# Patient Record
Sex: MALE | Race: WHITE
[De-identification: names, ages, dates, MRNs, and addresses within clinical notes are randomized per-mention and may not be internally consistent; named-entity substitution may affect disease eponyms.]

---

## 2017-05-15 ENCOUNTER — HOSPITAL ENCOUNTER (INPATIENT)
Dept: HOSPITAL 8 - ED | Age: 72
LOS: 7 days | Discharge: NURSING, REHAB OR LONG-TERM CARE | DRG: 871 | End: 2017-05-22
Attending: FAMILY MEDICINE
Payer: COMMERCIAL

## 2017-05-15 VITALS — WEIGHT: 163.36 LBS | HEIGHT: 70 IN | BODY MASS INDEX: 23.39 KG/M2

## 2017-05-15 VITALS — DIASTOLIC BLOOD PRESSURE: 74 MMHG | SYSTOLIC BLOOD PRESSURE: 132 MMHG

## 2017-05-15 DIAGNOSIS — N39.0: ICD-10-CM

## 2017-05-15 DIAGNOSIS — G30.9: ICD-10-CM

## 2017-05-15 DIAGNOSIS — D64.9: ICD-10-CM

## 2017-05-15 DIAGNOSIS — E86.0: ICD-10-CM

## 2017-05-15 DIAGNOSIS — E87.6: ICD-10-CM

## 2017-05-15 DIAGNOSIS — F02.81: ICD-10-CM

## 2017-05-15 DIAGNOSIS — J96.00: ICD-10-CM

## 2017-05-15 DIAGNOSIS — J32.9: ICD-10-CM

## 2017-05-15 DIAGNOSIS — G92: ICD-10-CM

## 2017-05-15 DIAGNOSIS — Z66: ICD-10-CM

## 2017-05-15 DIAGNOSIS — G47.00: ICD-10-CM

## 2017-05-15 DIAGNOSIS — Z78.1: ICD-10-CM

## 2017-05-15 DIAGNOSIS — J69.0: ICD-10-CM

## 2017-05-15 DIAGNOSIS — E44.0: ICD-10-CM

## 2017-05-15 DIAGNOSIS — Z82.5: ICD-10-CM

## 2017-05-15 DIAGNOSIS — R32: ICD-10-CM

## 2017-05-15 DIAGNOSIS — A41.9: Primary | ICD-10-CM

## 2017-05-15 LAB
AST SERPL-CCNC: 22 U/L (ref 15–37)
BUN SERPL-MCNC: 13 MG/DL (ref 7–18)

## 2017-05-15 PROCEDURE — 96372 THER/PROPH/DIAG INJ SC/IM: CPT

## 2017-05-15 PROCEDURE — 87081 CULTURE SCREEN ONLY: CPT

## 2017-05-15 PROCEDURE — 87086 URINE CULTURE/COLONY COUNT: CPT

## 2017-05-15 PROCEDURE — 82140 ASSAY OF AMMONIA: CPT

## 2017-05-15 PROCEDURE — 96361 HYDRATE IV INFUSION ADD-ON: CPT

## 2017-05-15 PROCEDURE — 81001 URINALYSIS AUTO W/SCOPE: CPT

## 2017-05-15 PROCEDURE — 87040 BLOOD CULTURE FOR BACTERIA: CPT

## 2017-05-15 PROCEDURE — 82533 TOTAL CORTISOL: CPT

## 2017-05-15 PROCEDURE — 71010: CPT

## 2017-05-15 PROCEDURE — 36415 COLL VENOUS BLD VENIPUNCTURE: CPT

## 2017-05-15 PROCEDURE — 83735 ASSAY OF MAGNESIUM: CPT

## 2017-05-15 PROCEDURE — 90732 PPSV23 VACC 2 YRS+ SUBQ/IM: CPT

## 2017-05-15 PROCEDURE — 84439 ASSAY OF FREE THYROXINE: CPT

## 2017-05-15 PROCEDURE — 87324 CLOSTRIDIUM AG IA: CPT

## 2017-05-15 PROCEDURE — 82607 VITAMIN B-12: CPT

## 2017-05-15 PROCEDURE — 84443 ASSAY THYROID STIM HORMONE: CPT

## 2017-05-15 PROCEDURE — 76770 US EXAM ABDO BACK WALL COMP: CPT

## 2017-05-15 PROCEDURE — 93005 ELECTROCARDIOGRAM TRACING: CPT

## 2017-05-15 PROCEDURE — 80048 BASIC METABOLIC PNL TOTAL CA: CPT

## 2017-05-15 PROCEDURE — 84145 PROCALCITONIN (PCT): CPT

## 2017-05-15 PROCEDURE — 83605 ASSAY OF LACTIC ACID: CPT

## 2017-05-15 PROCEDURE — 82803 BLOOD GASES ANY COMBINATION: CPT

## 2017-05-15 PROCEDURE — 85025 COMPLETE CBC W/AUTO DIFF WBC: CPT

## 2017-05-15 PROCEDURE — 70450 CT HEAD/BRAIN W/O DYE: CPT

## 2017-05-15 PROCEDURE — 80053 COMPREHEN METABOLIC PANEL: CPT

## 2017-05-15 PROCEDURE — 82746 ASSAY OF FOLIC ACID SERUM: CPT

## 2017-05-15 PROCEDURE — 82962 GLUCOSE BLOOD TEST: CPT

## 2017-05-15 PROCEDURE — 36600 WITHDRAWAL OF ARTERIAL BLOOD: CPT

## 2017-05-15 PROCEDURE — 96374 THER/PROPH/DIAG INJ IV PUSH: CPT

## 2017-05-15 RX ADMIN — RISPERIDONE SCH MG: 1 TABLET, ORALLY DISINTEGRATING ORAL at 23:09

## 2017-05-15 RX ADMIN — ENOXAPARIN SODIUM SCH MG: 40 INJECTION SUBCUTANEOUS at 23:09

## 2017-05-15 RX ADMIN — RISPERIDONE SCH MG: 1 TABLET, ORALLY DISINTEGRATING ORAL at 23:14

## 2017-05-15 RX ADMIN — SODIUM CHLORIDE AND POTASSIUM CHLORIDE SCH MLS/HR: .9; .15 SOLUTION INTRAVENOUS at 23:00

## 2017-05-15 RX ADMIN — CEFTRIAXONE SCH MLS/HR: 1 INJECTION, SOLUTION INTRAVENOUS at 23:05

## 2017-05-16 VITALS — SYSTOLIC BLOOD PRESSURE: 105 MMHG | DIASTOLIC BLOOD PRESSURE: 60 MMHG

## 2017-05-16 VITALS — DIASTOLIC BLOOD PRESSURE: 63 MMHG | SYSTOLIC BLOOD PRESSURE: 101 MMHG

## 2017-05-16 VITALS — DIASTOLIC BLOOD PRESSURE: 67 MMHG | SYSTOLIC BLOOD PRESSURE: 108 MMHG

## 2017-05-16 VITALS — DIASTOLIC BLOOD PRESSURE: 70 MMHG | SYSTOLIC BLOOD PRESSURE: 113 MMHG

## 2017-05-16 VITALS — SYSTOLIC BLOOD PRESSURE: 105 MMHG | DIASTOLIC BLOOD PRESSURE: 65 MMHG

## 2017-05-16 LAB — BUN SERPL-MCNC: 9 MG/DL (ref 7–18)

## 2017-05-16 RX ADMIN — RISPERIDONE SCH MG: 0.5 TABLET ORAL at 21:00

## 2017-05-16 RX ADMIN — ZIPRASIDONE MESYLATE PRN MG: 20 INJECTION, POWDER, LYOPHILIZED, FOR SOLUTION INTRAMUSCULAR at 22:55

## 2017-05-16 RX ADMIN — ENOXAPARIN SODIUM SCH MG: 40 INJECTION SUBCUTANEOUS at 21:45

## 2017-05-16 RX ADMIN — ZIPRASIDONE MESYLATE PRN MG: 20 INJECTION, POWDER, LYOPHILIZED, FOR SOLUTION INTRAMUSCULAR at 05:52

## 2017-05-16 RX ADMIN — Medication SCH MG: at 21:00

## 2017-05-16 RX ADMIN — CEFTRIAXONE SCH MLS/HR: 1 INJECTION, SOLUTION INTRAVENOUS at 21:45

## 2017-05-16 RX ADMIN — CEFTRIAXONE SCH MLS/HR: 1 INJECTION, SOLUTION INTRAVENOUS at 09:05

## 2017-05-16 RX ADMIN — ZIPRASIDONE MESYLATE PRN MG: 20 INJECTION, POWDER, LYOPHILIZED, FOR SOLUTION INTRAMUSCULAR at 13:45

## 2017-05-16 RX ADMIN — SODIUM CHLORIDE AND POTASSIUM CHLORIDE SCH MLS/HR: .9; .15 SOLUTION INTRAVENOUS at 14:05

## 2017-05-16 RX ADMIN — LORAZEPAM PRN MG: 2 INJECTION INTRAMUSCULAR; INTRAVENOUS at 16:24

## 2017-05-16 RX ADMIN — ZIPRASIDONE MESYLATE PRN MG: 20 INJECTION, POWDER, LYOPHILIZED, FOR SOLUTION INTRAMUSCULAR at 20:15

## 2017-05-17 VITALS — DIASTOLIC BLOOD PRESSURE: 76 MMHG | SYSTOLIC BLOOD PRESSURE: 116 MMHG

## 2017-05-17 VITALS — SYSTOLIC BLOOD PRESSURE: 117 MMHG | DIASTOLIC BLOOD PRESSURE: 75 MMHG

## 2017-05-17 VITALS — DIASTOLIC BLOOD PRESSURE: 64 MMHG | SYSTOLIC BLOOD PRESSURE: 122 MMHG

## 2017-05-17 RX ADMIN — Medication SCH MG: at 21:00

## 2017-05-17 RX ADMIN — ENOXAPARIN SODIUM SCH MG: 40 INJECTION SUBCUTANEOUS at 22:32

## 2017-05-17 RX ADMIN — CEFTRIAXONE SCH MLS/HR: 1 INJECTION, SOLUTION INTRAVENOUS at 10:12

## 2017-05-17 RX ADMIN — RISPERIDONE SCH MG: 2 TABLET ORAL at 21:00

## 2017-05-17 RX ADMIN — CEFTRIAXONE SCH MLS/HR: 1 INJECTION, SOLUTION INTRAVENOUS at 22:32

## 2017-05-17 RX ADMIN — ZIPRASIDONE MESYLATE PRN MG: 20 INJECTION, POWDER, LYOPHILIZED, FOR SOLUTION INTRAMUSCULAR at 18:41

## 2017-05-17 RX ADMIN — ZIPRASIDONE MESYLATE PRN MG: 20 INJECTION, POWDER, LYOPHILIZED, FOR SOLUTION INTRAMUSCULAR at 08:07

## 2017-05-17 RX ADMIN — RISPERIDONE SCH MG: 0.5 TABLET ORAL at 10:12

## 2017-05-18 VITALS — SYSTOLIC BLOOD PRESSURE: 119 MMHG | DIASTOLIC BLOOD PRESSURE: 66 MMHG

## 2017-05-18 VITALS — SYSTOLIC BLOOD PRESSURE: 108 MMHG | DIASTOLIC BLOOD PRESSURE: 63 MMHG

## 2017-05-18 VITALS — SYSTOLIC BLOOD PRESSURE: 121 MMHG | DIASTOLIC BLOOD PRESSURE: 69 MMHG

## 2017-05-18 VITALS — DIASTOLIC BLOOD PRESSURE: 80 MMHG | SYSTOLIC BLOOD PRESSURE: 131 MMHG

## 2017-05-18 LAB — BUN SERPL-MCNC: 11 MG/DL (ref 7–18)

## 2017-05-18 RX ADMIN — RISPERIDONE SCH MG: 0.5 TABLET ORAL at 08:32

## 2017-05-18 RX ADMIN — ENOXAPARIN SODIUM SCH MG: 40 INJECTION SUBCUTANEOUS at 22:01

## 2017-05-18 RX ADMIN — Medication SCH MG: at 21:00

## 2017-05-18 RX ADMIN — CEFTRIAXONE SCH MLS/HR: 1 INJECTION, SOLUTION INTRAVENOUS at 21:59

## 2017-05-18 RX ADMIN — CEFTRIAXONE SCH MLS/HR: 1 INJECTION, SOLUTION INTRAVENOUS at 08:33

## 2017-05-18 RX ADMIN — RISPERIDONE SCH MG: 2 TABLET ORAL at 21:35

## 2017-05-19 VITALS — SYSTOLIC BLOOD PRESSURE: 113 MMHG | DIASTOLIC BLOOD PRESSURE: 72 MMHG

## 2017-05-19 VITALS — SYSTOLIC BLOOD PRESSURE: 76 MMHG | DIASTOLIC BLOOD PRESSURE: 34 MMHG

## 2017-05-19 LAB
ABG COLLECTION SITE: (no result)
BUN SERPL-MCNC: 22 MG/DL (ref 7–18)
COLLATERAL CIRCULATION TESTING: NORMAL
DIFF TOTAL CELLS COUNTED: (no result)
VERIFY COUNTS?: YES

## 2017-05-19 RX ADMIN — PIPERACILLIN SODIUM AND TAZOBACTAM SODIUM SCH MLS/HR: 3; .375 INJECTION, POWDER, LYOPHILIZED, FOR SOLUTION INTRAVENOUS at 00:16

## 2017-05-19 RX ADMIN — PIPERACILLIN SODIUM AND TAZOBACTAM SODIUM SCH MLS/HR: 3; .375 INJECTION, POWDER, LYOPHILIZED, FOR SOLUTION INTRAVENOUS at 19:48

## 2017-05-19 RX ADMIN — RISPERIDONE SCH MG: 2 TABLET ORAL at 20:12

## 2017-05-19 RX ADMIN — PIPERACILLIN SODIUM AND TAZOBACTAM SODIUM SCH MLS/HR: 3; .375 INJECTION, POWDER, LYOPHILIZED, FOR SOLUTION INTRAVENOUS at 07:06

## 2017-05-19 RX ADMIN — PIPERACILLIN SODIUM AND TAZOBACTAM SODIUM SCH MLS/HR: 3; .375 INJECTION, POWDER, LYOPHILIZED, FOR SOLUTION INTRAVENOUS at 13:27

## 2017-05-19 RX ADMIN — LINEZOLID SCH MLS/HR: 600 INJECTION, SOLUTION INTRAVENOUS at 14:33

## 2017-05-19 RX ADMIN — LINEZOLID SCH MLS/HR: 600 INJECTION, SOLUTION INTRAVENOUS at 03:26

## 2017-05-19 RX ADMIN — LORAZEPAM PRN MG: 2 INJECTION INTRAMUSCULAR; INTRAVENOUS at 04:36

## 2017-05-19 RX ADMIN — RISPERIDONE SCH MG: 0.5 TABLET ORAL at 08:00

## 2017-05-19 RX ADMIN — SODIUM CHLORIDE SCH MLS/HR: 0.9 INJECTION, SOLUTION INTRAVENOUS at 20:07

## 2017-05-19 RX ADMIN — LORAZEPAM PRN MG: 2 INJECTION INTRAMUSCULAR; INTRAVENOUS at 22:30

## 2017-05-19 RX ADMIN — Medication SCH MG: at 20:12

## 2017-05-20 VITALS — DIASTOLIC BLOOD PRESSURE: 69 MMHG | SYSTOLIC BLOOD PRESSURE: 114 MMHG

## 2017-05-20 VITALS — SYSTOLIC BLOOD PRESSURE: 94 MMHG | DIASTOLIC BLOOD PRESSURE: 52 MMHG

## 2017-05-20 VITALS — SYSTOLIC BLOOD PRESSURE: 110 MMHG | DIASTOLIC BLOOD PRESSURE: 65 MMHG

## 2017-05-20 LAB
AST SERPL-CCNC: 33 U/L (ref 15–37)
BUN SERPL-MCNC: 20 MG/DL (ref 7–18)

## 2017-05-20 RX ADMIN — PIPERACILLIN SODIUM AND TAZOBACTAM SODIUM SCH MLS/HR: 3; .375 INJECTION, POWDER, LYOPHILIZED, FOR SOLUTION INTRAVENOUS at 06:31

## 2017-05-20 RX ADMIN — LORAZEPAM PRN MG: 2 INJECTION INTRAMUSCULAR; INTRAVENOUS at 04:30

## 2017-05-20 RX ADMIN — SODIUM CHLORIDE SCH MLS/HR: 0.9 INJECTION, SOLUTION INTRAVENOUS at 02:52

## 2017-05-20 RX ADMIN — RISPERIDONE SCH MG: 0.5 TABLET ORAL at 08:00

## 2017-05-20 RX ADMIN — PIPERACILLIN SODIUM AND TAZOBACTAM SODIUM SCH MLS/HR: 3; .375 INJECTION, POWDER, LYOPHILIZED, FOR SOLUTION INTRAVENOUS at 19:46

## 2017-05-20 RX ADMIN — PIPERACILLIN SODIUM AND TAZOBACTAM SODIUM SCH MLS/HR: 3; .375 INJECTION, POWDER, LYOPHILIZED, FOR SOLUTION INTRAVENOUS at 12:45

## 2017-05-20 RX ADMIN — LINEZOLID SCH MLS/HR: 600 INJECTION, SOLUTION INTRAVENOUS at 02:52

## 2017-05-20 RX ADMIN — RISPERIDONE SCH MG: 2 TABLET ORAL at 21:59

## 2017-05-20 RX ADMIN — LINEZOLID SCH MLS/HR: 600 INJECTION, SOLUTION INTRAVENOUS at 12:45

## 2017-05-20 RX ADMIN — ZIPRASIDONE MESYLATE PRN MG: 20 INJECTION, POWDER, LYOPHILIZED, FOR SOLUTION INTRAMUSCULAR at 22:03

## 2017-05-20 RX ADMIN — PIPERACILLIN SODIUM AND TAZOBACTAM SODIUM SCH MLS/HR: 3; .375 INJECTION, POWDER, LYOPHILIZED, FOR SOLUTION INTRAVENOUS at 02:22

## 2017-05-20 RX ADMIN — Medication SCH MG: at 21:00

## 2017-05-20 RX ADMIN — ZIPRASIDONE MESYLATE PRN MG: 20 INJECTION, POWDER, LYOPHILIZED, FOR SOLUTION INTRAMUSCULAR at 22:08

## 2017-05-21 VITALS — SYSTOLIC BLOOD PRESSURE: 114 MMHG | DIASTOLIC BLOOD PRESSURE: 67 MMHG

## 2017-05-21 VITALS — SYSTOLIC BLOOD PRESSURE: 129 MMHG | DIASTOLIC BLOOD PRESSURE: 70 MMHG

## 2017-05-21 VITALS — DIASTOLIC BLOOD PRESSURE: 70 MMHG | SYSTOLIC BLOOD PRESSURE: 118 MMHG

## 2017-05-21 VITALS — DIASTOLIC BLOOD PRESSURE: 88 MMHG | SYSTOLIC BLOOD PRESSURE: 158 MMHG

## 2017-05-21 LAB — BUN SERPL-MCNC: 16 MG/DL (ref 7–18)

## 2017-05-21 RX ADMIN — LINEZOLID SCH MLS/HR: 600 INJECTION, SOLUTION INTRAVENOUS at 15:47

## 2017-05-21 RX ADMIN — PIPERACILLIN SODIUM AND TAZOBACTAM SODIUM SCH MLS/HR: 3; .375 INJECTION, POWDER, LYOPHILIZED, FOR SOLUTION INTRAVENOUS at 18:40

## 2017-05-21 RX ADMIN — RISPERIDONE SCH MG: 2 TABLET ORAL at 20:45

## 2017-05-21 RX ADMIN — LINEZOLID SCH MLS/HR: 600 INJECTION, SOLUTION INTRAVENOUS at 03:19

## 2017-05-21 RX ADMIN — ZIPRASIDONE MESYLATE PRN MG: 20 INJECTION, POWDER, LYOPHILIZED, FOR SOLUTION INTRAMUSCULAR at 21:12

## 2017-05-21 RX ADMIN — Medication SCH MG: at 20:44

## 2017-05-21 RX ADMIN — PIPERACILLIN SODIUM AND TAZOBACTAM SODIUM SCH MLS/HR: 3; .375 INJECTION, POWDER, LYOPHILIZED, FOR SOLUTION INTRAVENOUS at 14:21

## 2017-05-21 RX ADMIN — PIPERACILLIN SODIUM AND TAZOBACTAM SODIUM SCH MLS/HR: 3; .375 INJECTION, POWDER, LYOPHILIZED, FOR SOLUTION INTRAVENOUS at 08:12

## 2017-05-21 RX ADMIN — ZIPRASIDONE MESYLATE PRN MG: 20 INJECTION, POWDER, LYOPHILIZED, FOR SOLUTION INTRAMUSCULAR at 07:34

## 2017-05-21 RX ADMIN — PIPERACILLIN SODIUM AND TAZOBACTAM SODIUM SCH MLS/HR: 3; .375 INJECTION, POWDER, LYOPHILIZED, FOR SOLUTION INTRAVENOUS at 02:04

## 2017-05-21 RX ADMIN — LORAZEPAM PRN MG: 2 INJECTION INTRAMUSCULAR; INTRAVENOUS at 01:23

## 2017-05-21 RX ADMIN — RISPERIDONE SCH MG: 0.5 TABLET ORAL at 08:00

## 2017-05-22 VITALS — SYSTOLIC BLOOD PRESSURE: 106 MMHG | DIASTOLIC BLOOD PRESSURE: 61 MMHG

## 2017-05-22 VITALS — SYSTOLIC BLOOD PRESSURE: 124 MMHG | DIASTOLIC BLOOD PRESSURE: 62 MMHG

## 2017-05-22 VITALS — DIASTOLIC BLOOD PRESSURE: 67 MMHG | SYSTOLIC BLOOD PRESSURE: 116 MMHG

## 2017-05-22 VITALS — DIASTOLIC BLOOD PRESSURE: 78 MMHG | SYSTOLIC BLOOD PRESSURE: 128 MMHG

## 2017-05-22 LAB — BUN SERPL-MCNC: 9 MG/DL (ref 7–18)

## 2017-05-22 RX ADMIN — PIPERACILLIN SODIUM AND TAZOBACTAM SODIUM SCH MLS/HR: 3; .375 INJECTION, POWDER, LYOPHILIZED, FOR SOLUTION INTRAVENOUS at 01:33

## 2017-05-22 RX ADMIN — LINEZOLID SCH MLS/HR: 600 INJECTION, SOLUTION INTRAVENOUS at 14:49

## 2017-05-22 RX ADMIN — LINEZOLID SCH MLS/HR: 600 INJECTION, SOLUTION INTRAVENOUS at 03:14

## 2017-05-22 RX ADMIN — PIPERACILLIN SODIUM AND TAZOBACTAM SODIUM SCH MLS/HR: 3; .375 INJECTION, POWDER, LYOPHILIZED, FOR SOLUTION INTRAVENOUS at 13:25

## 2017-05-22 RX ADMIN — PIPERACILLIN SODIUM AND TAZOBACTAM SODIUM SCH MLS/HR: 3; .375 INJECTION, POWDER, LYOPHILIZED, FOR SOLUTION INTRAVENOUS at 06:35

## 2017-05-22 RX ADMIN — RISPERIDONE SCH MG: 0.5 TABLET ORAL at 09:24

## 2017-06-15 ENCOUNTER — HOSPITAL ENCOUNTER (INPATIENT)
Facility: MEDICAL CENTER | Age: 72
LOS: 15 days | DRG: 872 | End: 2017-06-30
Attending: EMERGENCY MEDICINE | Admitting: INTERNAL MEDICINE
Payer: COMMERCIAL

## 2017-06-15 ENCOUNTER — APPOINTMENT (OUTPATIENT)
Dept: RADIOLOGY | Facility: MEDICAL CENTER | Age: 72
DRG: 872 | End: 2017-06-15
Attending: EMERGENCY MEDICINE
Payer: COMMERCIAL

## 2017-06-15 ENCOUNTER — APPOINTMENT (OUTPATIENT)
Dept: RADIOLOGY | Facility: MEDICAL CENTER | Age: 72
DRG: 872 | End: 2017-06-15
Attending: STUDENT IN AN ORGANIZED HEALTH CARE EDUCATION/TRAINING PROGRAM
Payer: COMMERCIAL

## 2017-06-15 ENCOUNTER — APPOINTMENT (OUTPATIENT)
Dept: RADIOLOGY | Facility: MEDICAL CENTER | Age: 72
DRG: 872 | End: 2017-06-15
Attending: INTERNAL MEDICINE
Payer: COMMERCIAL

## 2017-06-15 ENCOUNTER — RESOLUTE PROFESSIONAL BILLING HOSPITAL PROF FEE (OUTPATIENT)
Dept: HOSPITALIST | Facility: MEDICAL CENTER | Age: 72
End: 2017-06-15
Payer: COMMERCIAL

## 2017-06-15 DIAGNOSIS — A04.72 C. DIFFICILE COLITIS: ICD-10-CM

## 2017-06-15 DIAGNOSIS — A04.72 C. DIFFICILE DIARRHEA: ICD-10-CM

## 2017-06-15 DIAGNOSIS — L25.8 DERMATITIS ASSOCIATED WITH INCONTINENCE: ICD-10-CM

## 2017-06-15 DIAGNOSIS — G30.9 ALZHEIMER'S DEMENTIA WITHOUT BEHAVIORAL DISTURBANCE, UNSPECIFIED TIMING OF DEMENTIA ONSET: ICD-10-CM

## 2017-06-15 DIAGNOSIS — J18.9 PNEUMONIA OF RIGHT LOWER LOBE DUE TO INFECTIOUS ORGANISM: ICD-10-CM

## 2017-06-15 DIAGNOSIS — F02.818 LATE ONSET ALZHEIMER'S DISEASE WITH BEHAVIORAL DISTURBANCE (HCC): ICD-10-CM

## 2017-06-15 DIAGNOSIS — F02.80 ALZHEIMER'S DEMENTIA WITHOUT BEHAVIORAL DISTURBANCE, UNSPECIFIED TIMING OF DEMENTIA ONSET: ICD-10-CM

## 2017-06-15 DIAGNOSIS — R32 DERMATITIS ASSOCIATED WITH INCONTINENCE: ICD-10-CM

## 2017-06-15 DIAGNOSIS — G30.1 LATE ONSET ALZHEIMER'S DISEASE WITH BEHAVIORAL DISTURBANCE (HCC): ICD-10-CM

## 2017-06-15 PROBLEM — D64.9 ANEMIA: Status: ACTIVE | Noted: 2017-06-15

## 2017-06-15 PROBLEM — R19.7 DIARRHEA: Status: ACTIVE | Noted: 2017-06-15

## 2017-06-15 PROBLEM — D72.829 LEUKOCYTOSIS: Status: ACTIVE | Noted: 2017-06-15

## 2017-06-15 PROBLEM — E87.6 HYPOKALEMIA: Status: ACTIVE | Noted: 2017-06-15

## 2017-06-15 LAB
ALBUMIN SERPL BCP-MCNC: 2.7 G/DL (ref 3.2–4.9)
ALBUMIN/GLOB SERPL: 1 G/DL
ALP SERPL-CCNC: 32 U/L (ref 30–99)
ALT SERPL-CCNC: 10 U/L (ref 2–50)
ANION GAP SERPL CALC-SCNC: 7 MMOL/L (ref 0–11.9)
APPEARANCE UR: CLEAR
AST SERPL-CCNC: 15 U/L (ref 12–45)
BASOPHILS # BLD AUTO: 0.3 % (ref 0–1.8)
BASOPHILS # BLD: 0.06 K/UL (ref 0–0.12)
BILIRUB SERPL-MCNC: 0.4 MG/DL (ref 0.1–1.5)
BILIRUB UR QL STRIP.AUTO: NEGATIVE
BUN SERPL-MCNC: 15 MG/DL (ref 8–22)
C DIFF DNA SPEC QL NAA+PROBE: POSITIVE
C DIFF TOX A+B STL QL IA: POSITIVE
C DIFF TOX GENS STL QL NAA+PROBE: ABNORMAL
CALCIUM SERPL-MCNC: 7.5 MG/DL (ref 8.5–10.5)
CHLORIDE SERPL-SCNC: 107 MMOL/L (ref 96–112)
CO2 SERPL-SCNC: 22 MMOL/L (ref 20–33)
COLOR UR: YELLOW
CREAT SERPL-MCNC: 0.72 MG/DL (ref 0.5–1.4)
EKG IMPRESSION: NORMAL
EKG IMPRESSION: NORMAL
EOSINOPHIL # BLD AUTO: 0.04 K/UL (ref 0–0.51)
EOSINOPHIL NFR BLD: 0.2 % (ref 0–6.9)
ERYTHROCYTE [DISTWIDTH] IN BLOOD BY AUTOMATED COUNT: 52.6 FL (ref 35.9–50)
GFR SERPL CREATININE-BSD FRML MDRD: >60 ML/MIN/1.73 M 2
GLOBULIN SER CALC-MCNC: 2.6 G/DL (ref 1.9–3.5)
GLUCOSE SERPL-MCNC: 115 MG/DL (ref 65–99)
GLUCOSE UR STRIP.AUTO-MCNC: NEGATIVE MG/DL
HCT VFR BLD AUTO: 39.6 % (ref 42–52)
HGB BLD-MCNC: 12.3 G/DL (ref 14–18)
IMM GRANULOCYTES # BLD AUTO: 0.09 K/UL (ref 0–0.11)
IMM GRANULOCYTES NFR BLD AUTO: 0.5 % (ref 0–0.9)
KETONES UR STRIP.AUTO-MCNC: 10 MG/DL
LACTATE BLD-SCNC: 1.1 MMOL/L (ref 0.5–2)
LACTATE BLD-SCNC: 1.2 MMOL/L (ref 0.5–2)
LACTATE BLD-SCNC: 1.6 MMOL/L (ref 0.5–2)
LEUKOCYTE ESTERASE UR QL STRIP.AUTO: NEGATIVE
LYMPHOCYTES # BLD AUTO: 1.49 K/UL (ref 1–4.8)
LYMPHOCYTES NFR BLD: 8.6 % (ref 22–41)
MAGNESIUM SERPL-MCNC: 1.6 MG/DL (ref 1.5–2.5)
MCH RBC QN AUTO: 30.1 PG (ref 27–33)
MCHC RBC AUTO-ENTMCNC: 31.1 G/DL (ref 33.7–35.3)
MCV RBC AUTO: 96.8 FL (ref 81.4–97.8)
MICRO URNS: ABNORMAL
MONOCYTES # BLD AUTO: 2.18 K/UL (ref 0–0.85)
MONOCYTES NFR BLD AUTO: 12.6 % (ref 0–13.4)
NEUTROPHILS # BLD AUTO: 13.38 K/UL (ref 1.82–7.42)
NEUTROPHILS NFR BLD: 77.8 % (ref 44–72)
NITRITE UR QL STRIP.AUTO: NEGATIVE
NRBC # BLD AUTO: 0 K/UL
NRBC BLD AUTO-RTO: 0 /100 WBC
PH UR STRIP.AUTO: 5.5 [PH]
PLATELET # BLD AUTO: 115 K/UL (ref 164–446)
PMV BLD AUTO: 9.9 FL (ref 9–12.9)
POTASSIUM SERPL-SCNC: 3.2 MMOL/L (ref 3.6–5.5)
PROT SERPL-MCNC: 5.3 G/DL (ref 6–8.2)
PROT UR QL STRIP: NEGATIVE MG/DL
RBC # BLD AUTO: 4.09 M/UL (ref 4.7–6.1)
RBC UR QL AUTO: NEGATIVE
SODIUM SERPL-SCNC: 136 MMOL/L (ref 135–145)
SP GR UR STRIP.AUTO: 1.02
WBC # BLD AUTO: 17.2 K/UL (ref 4.8–10.8)
WBC STL QL MICRO: NORMAL

## 2017-06-15 PROCEDURE — 84100 ASSAY OF PHOSPHORUS: CPT

## 2017-06-15 PROCEDURE — 770020 HCHG ROOM/CARE - TELE (206)

## 2017-06-15 PROCEDURE — 87493 C DIFF AMPLIFIED PROBE: CPT

## 2017-06-15 PROCEDURE — 83605 ASSAY OF LACTIC ACID: CPT | Mod: 91

## 2017-06-15 PROCEDURE — 700102 HCHG RX REV CODE 250 W/ 637 OVERRIDE(OP): Performed by: STUDENT IN AN ORGANIZED HEALTH CARE EDUCATION/TRAINING PROGRAM

## 2017-06-15 PROCEDURE — 700101 HCHG RX REV CODE 250: Performed by: STUDENT IN AN ORGANIZED HEALTH CARE EDUCATION/TRAINING PROGRAM

## 2017-06-15 PROCEDURE — 700102 HCHG RX REV CODE 250 W/ 637 OVERRIDE(OP): Performed by: INTERNAL MEDICINE

## 2017-06-15 PROCEDURE — 99223 1ST HOSP IP/OBS HIGH 75: CPT | Performed by: INTERNAL MEDICINE

## 2017-06-15 PROCEDURE — G8996 SWALLOW CURRENT STATUS: HCPCS | Mod: CK

## 2017-06-15 PROCEDURE — 36415 COLL VENOUS BLD VENIPUNCTURE: CPT

## 2017-06-15 PROCEDURE — 700105 HCHG RX REV CODE 258: Performed by: STUDENT IN AN ORGANIZED HEALTH CARE EDUCATION/TRAINING PROGRAM

## 2017-06-15 PROCEDURE — 83735 ASSAY OF MAGNESIUM: CPT | Mod: 91

## 2017-06-15 PROCEDURE — 700111 HCHG RX REV CODE 636 W/ 250 OVERRIDE (IP): Performed by: STUDENT IN AN ORGANIZED HEALTH CARE EDUCATION/TRAINING PROGRAM

## 2017-06-15 PROCEDURE — 87086 URINE CULTURE/COLONY COUNT: CPT

## 2017-06-15 PROCEDURE — 96365 THER/PROPH/DIAG IV INF INIT: CPT

## 2017-06-15 PROCEDURE — 700111 HCHG RX REV CODE 636 W/ 250 OVERRIDE (IP): Performed by: EMERGENCY MEDICINE

## 2017-06-15 PROCEDURE — 83630 LACTOFERRIN FECAL (QUAL): CPT

## 2017-06-15 PROCEDURE — 51798 US URINE CAPACITY MEASURE: CPT

## 2017-06-15 PROCEDURE — 96361 HYDRATE IV INFUSION ADD-ON: CPT

## 2017-06-15 PROCEDURE — 71010 DX-CHEST-PORTABLE (1 VIEW): CPT

## 2017-06-15 PROCEDURE — 93005 ELECTROCARDIOGRAM TRACING: CPT | Performed by: STUDENT IN AN ORGANIZED HEALTH CARE EDUCATION/TRAINING PROGRAM

## 2017-06-15 PROCEDURE — 85027 COMPLETE CBC AUTOMATED: CPT

## 2017-06-15 PROCEDURE — 92610 EVALUATE SWALLOWING FUNCTION: CPT

## 2017-06-15 PROCEDURE — 700101 HCHG RX REV CODE 250: Performed by: INTERNAL MEDICINE

## 2017-06-15 PROCEDURE — 96367 TX/PROPH/DG ADDL SEQ IV INF: CPT

## 2017-06-15 PROCEDURE — 87040 BLOOD CULTURE FOR BACTERIA: CPT | Mod: 91

## 2017-06-15 PROCEDURE — 74000 DX-ABDOMEN-1 VIEW: CPT

## 2017-06-15 PROCEDURE — 700105 HCHG RX REV CODE 258: Performed by: EMERGENCY MEDICINE

## 2017-06-15 PROCEDURE — 81003 URINALYSIS AUTO W/O SCOPE: CPT

## 2017-06-15 PROCEDURE — 93010 ELECTROCARDIOGRAM REPORT: CPT | Mod: 76 | Performed by: INTERNAL MEDICINE

## 2017-06-15 PROCEDURE — G8997 SWALLOW GOAL STATUS: HCPCS | Mod: CJ

## 2017-06-15 PROCEDURE — 80053 COMPREHEN METABOLIC PANEL: CPT | Mod: 91

## 2017-06-15 PROCEDURE — 87324 CLOSTRIDIUM AG IA: CPT

## 2017-06-15 PROCEDURE — 84484 ASSAY OF TROPONIN QUANT: CPT

## 2017-06-15 PROCEDURE — 85025 COMPLETE CBC W/AUTO DIFF WBC: CPT

## 2017-06-15 PROCEDURE — 99285 EMERGENCY DEPT VISIT HI MDM: CPT

## 2017-06-15 PROCEDURE — 89055 LEUKOCYTE ASSESSMENT FECAL: CPT

## 2017-06-15 PROCEDURE — 85007 BL SMEAR W/DIFF WBC COUNT: CPT

## 2017-06-15 PROCEDURE — A9270 NON-COVERED ITEM OR SERVICE: HCPCS | Performed by: STUDENT IN AN ORGANIZED HEALTH CARE EDUCATION/TRAINING PROGRAM

## 2017-06-15 PROCEDURE — A9270 NON-COVERED ITEM OR SERVICE: HCPCS | Performed by: INTERNAL MEDICINE

## 2017-06-15 RX ORDER — POTASSIUM CHLORIDE 20 MEQ/1
40 TABLET, EXTENDED RELEASE ORAL ONCE
Status: COMPLETED | OUTPATIENT
Start: 2017-06-15 | End: 2017-06-15

## 2017-06-15 RX ORDER — QUETIAPINE FUMARATE 25 MG/1
50 TABLET, FILM COATED ORAL EVERY 8 HOURS PRN
Status: DISCONTINUED | OUTPATIENT
Start: 2017-06-15 | End: 2017-06-15

## 2017-06-15 RX ORDER — SODIUM CHLORIDE 9 MG/ML
30 INJECTION, SOLUTION INTRAVENOUS ONCE
Status: COMPLETED | OUTPATIENT
Start: 2017-06-15 | End: 2017-06-15

## 2017-06-15 RX ORDER — BISACODYL 10 MG
10 SUPPOSITORY, RECTAL RECTAL
Status: DISCONTINUED | OUTPATIENT
Start: 2017-06-15 | End: 2017-06-15 | Stop reason: SINTOL

## 2017-06-15 RX ORDER — SODIUM CHLORIDE 9 MG/ML
1000 INJECTION, SOLUTION INTRAVENOUS ONCE
Status: DISCONTINUED | OUTPATIENT
Start: 2017-06-15 | End: 2017-06-15

## 2017-06-15 RX ORDER — DIVALPROEX SODIUM 125 MG/1
125 CAPSULE, COATED PELLETS ORAL 4 TIMES DAILY
COMMUNITY

## 2017-06-15 RX ORDER — TAMSULOSIN HYDROCHLORIDE 0.4 MG/1
0.8 CAPSULE ORAL
COMMUNITY

## 2017-06-15 RX ORDER — LORAZEPAM 1 MG/1
1 TABLET ORAL EVERY 4 HOURS PRN
Status: DISCONTINUED | OUTPATIENT
Start: 2017-06-15 | End: 2017-06-15

## 2017-06-15 RX ORDER — CEFTRIAXONE 1 G/1
1 INJECTION, POWDER, FOR SOLUTION INTRAMUSCULAR; INTRAVENOUS ONCE
Status: COMPLETED | OUTPATIENT
Start: 2017-06-15 | End: 2017-06-15

## 2017-06-15 RX ORDER — POTASSIUM CHLORIDE 1.5 G/1.58G
40 POWDER, FOR SOLUTION ORAL ONCE
Status: COMPLETED | OUTPATIENT
Start: 2017-06-15 | End: 2017-06-15

## 2017-06-15 RX ORDER — QUETIAPINE FUMARATE 25 MG/1
50 TABLET, FILM COATED ORAL EVERY 8 HOURS PRN
COMMUNITY

## 2017-06-15 RX ORDER — POLYETHYLENE GLYCOL 3350 17 G/17G
1 POWDER, FOR SOLUTION ORAL
Status: DISCONTINUED | OUTPATIENT
Start: 2017-06-15 | End: 2017-06-15 | Stop reason: SINTOL

## 2017-06-15 RX ORDER — LORAZEPAM 2 MG/ML
0.5 INJECTION INTRAMUSCULAR
Status: DISCONTINUED | OUTPATIENT
Start: 2017-06-15 | End: 2017-06-19

## 2017-06-15 RX ORDER — SODIUM CHLORIDE 9 MG/ML
INJECTION, SOLUTION INTRAVENOUS CONTINUOUS
Status: DISCONTINUED | OUTPATIENT
Start: 2017-06-15 | End: 2017-06-17

## 2017-06-15 RX ORDER — ACETAMINOPHEN 325 MG/1
650 TABLET ORAL EVERY 6 HOURS PRN
Status: DISCONTINUED | OUTPATIENT
Start: 2017-06-15 | End: 2017-06-30 | Stop reason: HOSPADM

## 2017-06-15 RX ORDER — AMOXICILLIN 250 MG
2 CAPSULE ORAL 2 TIMES DAILY
Status: DISCONTINUED | OUTPATIENT
Start: 2017-06-15 | End: 2017-06-15 | Stop reason: SINTOL

## 2017-06-15 RX ORDER — QUETIAPINE FUMARATE 25 MG/1
25 TABLET, FILM COATED ORAL 2 TIMES DAILY
COMMUNITY

## 2017-06-15 RX ORDER — CIPROFLOXACIN 500 MG/1
500 TABLET, FILM COATED ORAL 2 TIMES DAILY
Status: ON HOLD | COMMUNITY
Start: 2017-06-02 | End: 2017-06-30

## 2017-06-15 RX ORDER — LORAZEPAM 2 MG/ML
2 INJECTION INTRAMUSCULAR
Status: DISCONTINUED | OUTPATIENT
Start: 2017-06-15 | End: 2017-06-19

## 2017-06-15 RX ORDER — ACETAMINOPHEN 325 MG/1
650 TABLET ORAL EVERY 6 HOURS PRN
COMMUNITY

## 2017-06-15 RX ORDER — DIVALPROEX SODIUM 125 MG/1
125 CAPSULE, COATED PELLETS ORAL EVERY 6 HOURS
Status: DISCONTINUED | OUTPATIENT
Start: 2017-06-15 | End: 2017-06-30 | Stop reason: HOSPADM

## 2017-06-15 RX ORDER — AZITHROMYCIN 500 MG/1
500 INJECTION, POWDER, LYOPHILIZED, FOR SOLUTION INTRAVENOUS ONCE
Status: COMPLETED | OUTPATIENT
Start: 2017-06-15 | End: 2017-06-15

## 2017-06-15 RX ORDER — SODIUM CHLORIDE 9 MG/ML
500 INJECTION, SOLUTION INTRAVENOUS ONCE
Status: COMPLETED | OUTPATIENT
Start: 2017-06-15 | End: 2017-06-15

## 2017-06-15 RX ORDER — SODIUM CHLORIDE 9 MG/ML
INJECTION, SOLUTION INTRAVENOUS CONTINUOUS
Status: DISCONTINUED | OUTPATIENT
Start: 2017-06-15 | End: 2017-06-15

## 2017-06-15 RX ORDER — LORAZEPAM 2 MG/ML
1 INJECTION INTRAMUSCULAR
Status: DISCONTINUED | OUTPATIENT
Start: 2017-06-15 | End: 2017-06-19

## 2017-06-15 RX ORDER — QUETIAPINE FUMARATE 25 MG/1
25 TABLET, FILM COATED ORAL 2 TIMES DAILY
Status: DISCONTINUED | OUTPATIENT
Start: 2017-06-15 | End: 2017-06-15

## 2017-06-15 RX ORDER — LORAZEPAM 1 MG/1
1 TABLET ORAL EVERY 4 HOURS PRN
Status: ON HOLD | COMMUNITY
End: 2017-06-30

## 2017-06-15 RX ADMIN — METRONIDAZOLE 500 MG: 500 INJECTION, SOLUTION INTRAVENOUS at 13:56

## 2017-06-15 RX ADMIN — VANCOMYCIN HYDROCHLORIDE 125 MG: 10 INJECTION, POWDER, LYOPHILIZED, FOR SOLUTION INTRAVENOUS at 17:38

## 2017-06-15 RX ADMIN — DIVALPROEX SODIUM 125 MG: 125 CAPSULE, COATED PELLETS ORAL at 17:38

## 2017-06-15 RX ADMIN — DIVALPROEX SODIUM 125 MG: 125 CAPSULE, COATED PELLETS ORAL at 11:59

## 2017-06-15 RX ADMIN — SODIUM CHLORIDE: 9 INJECTION, SOLUTION INTRAVENOUS at 20:27

## 2017-06-15 RX ADMIN — POTASSIUM CHLORIDE 40 MEQ: 1.5 POWDER, FOR SOLUTION ORAL at 16:22

## 2017-06-15 RX ADMIN — SODIUM CHLORIDE 2382 ML: 9 INJECTION, SOLUTION INTRAVENOUS at 02:45

## 2017-06-15 RX ADMIN — AZITHROMYCIN FOR INJECTION INJECTION, POWDER, LYOPHILIZED, FOR SOLUTION 500 MG: 500 INJECTION INTRAVENOUS at 04:10

## 2017-06-15 RX ADMIN — METRONIDAZOLE 500 MG: 500 INJECTION, SOLUTION INTRAVENOUS at 20:26

## 2017-06-15 RX ADMIN — ENOXAPARIN SODIUM 40 MG: 100 INJECTION SUBCUTANEOUS at 10:07

## 2017-06-15 RX ADMIN — ACETAMINOPHEN 650 MG: 325 TABLET, FILM COATED ORAL at 20:26

## 2017-06-15 RX ADMIN — THERA TABS 1 TABLET: TAB at 13:56

## 2017-06-15 RX ADMIN — SODIUM CHLORIDE 500 ML: 9 INJECTION, SOLUTION INTRAVENOUS at 20:27

## 2017-06-15 RX ADMIN — SODIUM CHLORIDE: 9 INJECTION, SOLUTION INTRAVENOUS at 07:35

## 2017-06-15 RX ADMIN — QUETIAPINE FUMARATE 25 MG: 25 TABLET ORAL at 11:59

## 2017-06-15 RX ADMIN — DOXYCYCLINE 100 MG: 100 INJECTION, POWDER, LYOPHILIZED, FOR SOLUTION INTRAVENOUS at 10:07

## 2017-06-15 RX ADMIN — VANCOMYCIN HYDROCHLORIDE 125 MG: 10 INJECTION, POWDER, LYOPHILIZED, FOR SOLUTION INTRAVENOUS at 11:59

## 2017-06-15 RX ADMIN — CEFTRIAXONE SODIUM 1 G: 1 INJECTION, POWDER, FOR SOLUTION INTRAMUSCULAR; INTRAVENOUS at 03:35

## 2017-06-15 RX ADMIN — VANCOMYCIN HYDROCHLORIDE 125 MG: 10 INJECTION, POWDER, LYOPHILIZED, FOR SOLUTION INTRAVENOUS at 07:45

## 2017-06-15 ASSESSMENT — PAIN SCALES - GENERAL
PAINLEVEL_OUTOF10: 0

## 2017-06-15 ASSESSMENT — LIFESTYLE VARIABLES
ALCOHOL_USE: NO
EVER_SMOKED: NEVER

## 2017-06-15 NOTE — IP AVS SNAPSHOT
EveryMove Access Code: [email or web address]  Expires: 7/30/2017 12:18 PM    Your email address is not on file at [facility].  Email Addresses are required for you to sign up for EveryMove, please contact [phone] to verify your personal information and to provide your email address prior to attempting to register for EveryMove.    Neymar Anderson  [address] [place] [address]    Transperat  A secure, online tool to manage your health information     [facility]’s EveryMove® is a secure, online tool that connects you to your personalized health information from the privacy of your home -- day or night - making it very easy for you to manage your healthcare. Once the activation process is completed, you can even access your medical information using the EveryMove ophelia, which is available for free in the Apple Ophelia store or Google Play store.     To learn more about EveryMove, visit www."[employer]/EveryMove    There are two levels of access available (as shown below):   My Chart Features  [facility] Primary Care Doctor [facility]  Specialists [facility]  Urgent  Care Non-[facility] Primary Care Doctor   Email your healthcare team securely and privately 24/7 X X X    Manage appointments: schedule your next appointment; view details of past/upcoming appointments X      Request prescription refills. X      View recent personal medical records, including lab and immunizations X X X X   View health record, including health history, allergies, medications X X X X   Read reports about your outpatient visits, procedures, consult and ER notes X X X X   See your discharge summary, which is a recap of your hospital and/or ER visit that includes your diagnosis, lab results, and care plan X X  X     How to register for Transperat:  Once your e-mail address has been verified, follow the following steps to sign up for Transperat.     1. Go to  [email or web address]  2. Click on the Sign Up Now box, which takes you to the New Member Sign Up page. You will need  to provide the following information:  a. Enter your [employer] Access Code exactly as it appears at the top of this page. (You will not need to use this code after you’ve completed the sign-up process. If you do not sign up before the expiration date, you must request a new code.)   b. Enter your date of birth.   c. Enter your home email address.   d. Click Submit, and follow the next screen’s instructions.  3. Create a [employer] ID. This will be your [employer]istics login ID and cannot be changed, so think of one that is secure and easy to remember.  4. Create a [employer] password. You can change your password at any time.  5. Enter your Password Reset Question and Answer. This can be used at a later time if you forget your password.   6. Enter your e-mail address. This allows you to receive e-mail notifications when new information is available in [employer].  7. Click Sign Up. You can now view your health information.    For assistance activating your [employer] account, call [phone]

## 2017-06-15 NOTE — H&P
[facility] Admitting History and Physical    Name Neymar Anderson     1945   Age/Sex 71 y.o. male   MRN [id number]   Code Status  Full code     After 5PM or if no immediate response to page, please call for cross-coverage  Attending/Team: Dr. Ibrahim Call [phone] to page   1st Call - Day Intern (R1):   Dr. Kemp  2nd Call - Day Sr. Resident (R2/R3):   Dr. Minaya       Chief Complaint:  Diarrhea and fever    HPI:  Neymar Anderson is a 71 y.o. male with past medical history of Alzheimer dementia, brought to the ER from [facility] for diarrhea and fever for two days, patient is poor historian, answer all the questions with no, all the history was obtained from chart review, Patient has  fever and diarrhea, patient recently finished 10 day course of ciprofloxacin.The patient denies headache, chest pain, abdominal pain, or shortness of breath.    Review of Systems   Unable to perform ROS          Past Medical History:   No past medical history on file.   Unable to perform     Past Surgical History:  No past surgical history on file.  Unable to perform  Current Outpatient Medications:  Home Medications     **Home medications have not yet been reviewed for this encounter**        Medication Allergy/Sensitivities:  Allergies no known allergies    Family History:  No family history on file.  Unable to perform  Social History:  Social History     Social History   • Marital Status: Single     Spouse Name: N/A   • Number of Children: N/A   • Years of Education: N/A     Occupational History   • Not on file.     Social History Main Topics   • Smoking status: Not on file   • Smokeless tobacco: Not on file   • Alcohol Use: Not on file   • Drug Use: Not on file   • Sexual Activity: Not on file     Other Topics Concern   • Not on file     Social History Narrative   • No narrative on file     Living situation: [facility]   PCP : Bong Monson M.D.    Physical Exam     Filed Vitals:    06/15/17 1399  "06/15/17 0400 06/15/17 0430 06/15/17 0500   BP:       Pulse: 108 112 100 114   Temp:       Resp: 24 24 24 16   Height:       Weight:       SpO2: 89% 98% 99% 99%     Body mass index is 26.61 kg/(m^2).  BP 98/62 mmHg  Pulse 114  Temp(Src) 37.4 °C (99.3 °F)  Resp 16  Ht 1.727 m (5' 8\")  Wt 79.379 kg (175 lb)  BMI 26.61 kg/m2  SpO2 99%  O2 therapy: Pulse Oximetry: 99 %, O2 (LPM): 0, O2 Delivery: None (Room Air)    Physical Exam   Constitutional:   Cachexia,     HENT:   Head: Normocephalic and atraumatic.   Eyes: Pupils are equal, round, and reactive to light. Right eye exhibits no discharge. Left eye exhibits no discharge.   Neck: No JVD present. No tracheal deviation present.   Cardiovascular: Normal heart sounds.  Exam reveals no gallop and no friction rub.    No murmur heard.  Pulmonary/Chest: No respiratory distress. He has no wheezes. He has no rales. He exhibits no tenderness.   Abdominal: Soft. He exhibits no distension. There is no tenderness. There is no rebound and no guarding.   Musculoskeletal: He exhibits no edema or tenderness.   3/5 motor strength in the lower extremities  , rt sided resting tremor for parkinson disappear with sleeping   Lymphadenopathy:     He has no cervical adenopathy.   Neurological: He is alert.   Disoriented x 3   Skin: Skin is warm.       Data Review       Old Records Request:   Completed  Current Records review and summary: Completed    Lab Data Review:  Recent Results (from the past 24 hour(s))   COMP METABOLIC PANEL    Collection Time: 06/15/17  1:48 AM   Result Value Ref Range    Sodium 136 135 - 145 mmol/L    Potassium 3.2 (L) 3.6 - 5.5 mmol/L    Chloride 107 96 - 112 mmol/L    Co2 22 20 - 33 mmol/L    Anion Gap 7.0 0.0 - 11.9    Glucose 115 (H) 65 - 99 mg/dL    Bun 15 8 - 22 mg/dL    Creatinine 0.72 0.50 - 1.40 mg/dL    Calcium 7.5 (L) 8.5 - 10.5 mg/dL    AST(SGOT) 15 12 - 45 U/L    ALT(SGPT) 10 2 - 50 U/L    Alkaline Phosphatase 32 30 - 99 U/L    Total Bilirubin 0.4 " 0.1 - 1.5 mg/dL    Albumin 2.7 (L) 3.2 - 4.9 g/dL    Total Protein 5.3 (L) 6.0 - 8.2 g/dL    Globulin 2.6 1.9 - 3.5 g/dL    A-G Ratio 1.0 g/dL   ESTIMATED GFR    Collection Time: 06/15/17  1:48 AM   Result Value Ref Range    GFR If African American >60 >60 mL/min/1.73 m 2    GFR If Non African American >60 >60 mL/min/1.73 m 2   LACTIC ACID    Collection Time: 06/15/17  2:30 AM   Result Value Ref Range    Lactic Acid 1.1 0.5 - 2.0 mmol/L   CBC WITH DIFFERENTIAL    Collection Time: 06/15/17  4:00 AM   Result Value Ref Range    WBC 17.2 (H) 4.8 - 10.8 K/uL    RBC 4.09 (L) 4.70 - 6.10 M/uL    Hemoglobin 12.3 (L) 14.0 - 18.0 g/dL    Hematocrit 39.6 (L) 42.0 - 52.0 %    MCV 96.8 81.4 - 97.8 fL    MCH 30.1 27.0 - 33.0 pg    MCHC 31.1 (L) 33.7 - 35.3 g/dL    RDW 52.6 (H) 35.9 - 50.0 fL    Platelet Count 115 (L) 164 - 446 K/uL    MPV 9.9 9.0 - 12.9 fL    Neutrophils-Polys 77.80 (H) 44.00 - 72.00 %    Lymphocytes 8.60 (L) 22.00 - 41.00 %    Monocytes 12.60 0.00 - 13.40 %    Eosinophils 0.20 0.00 - 6.90 %    Basophils 0.30 0.00 - 1.80 %    Immature Granulocytes 0.50 0.00 - 0.90 %    Nucleated RBC 0.00 /100 WBC    Neutrophils (Absolute) 13.38 (H) 1.82 - 7.42 K/uL    Lymphs (Absolute) 1.49 1.00 - 4.80 K/uL    Monos (Absolute) 2.18 (H) 0.00 - 0.85 K/uL    Eos (Absolute) 0.04 0.00 - 0.51 K/uL    Baso (Absolute) 0.06 0.00 - 0.12 K/uL    Immature Granulocytes (abs) 0.09 0.00 - 0.11 K/uL    NRBC (Absolute) 0.00 K/uL   URINALYSIS    Collection Time: 06/15/17  5:15 AM   Result Value Ref Range    Color Yellow     Character Clear     Specific Gravity 1.016 <1.035    Ph 5.5 5.0-8.0    Glucose Negative Negative mg/dL    Ketones 10 (A) Negative mg/dL    Protein Negative Negative mg/dL    Bilirubin Negative Negative    Nitrite Negative Negative    Leukocyte Esterase Negative Negative    Occult Blood Negative Negative    Micro Urine Req see below        Imaging/Procedures Review:    ndependant Imaging Review: Completed  DX-CHEST-PORTABLE (1  VIEW)   Final Result         1.  Hazy bilateral lower lobe infiltrates, greater on the right.         EKG: new order submitted   EKG Independant Review: Deferred     (x) Records reviewed and summarized in current documentation           Assessment/Plan     #  Aspiration Pneumonia  Vs CAP  Patient had fever , tachycardia, tachypnea   elevated WBC 17.2,   on CXR showed bilateral lower infilterate greater on the right      Plan  -  Admitted on tele  -  continue pulse oxymetry   -  pulmonary hygiene with suctioning as needed  - Continue RT protocol  - started vanco oral for C diff and C3 and doxycycline    - CBC and BMP tomorrow  - 2 x blood culture  - sputum culture   - morning team to consider  speech for swallowing evaluate      # possible C diff infection, diarrhea   - patient with increase number of bowel movement, mucus stool    - WBC 17 , creatinine 0.7  - test C Diff by PCR,  NAP1-BI      Plan  - isolation precaution    - vancomycin 50 mg/mL oral soln 125 mg, Oral, every 6 HRs   - IV hydration for hypotension  - fecal studies    # Alzheimer dementia   Stable       # Agitation   -Stable  -Patient on valproic acid and quatiapine home meds  -Will continue home meds      # Anemia  -Hemoglobin was 12.3 on admission,   -MCV normal  , RDW elevated  Recent Labs      06/15/17   0400   RBC  4.09*   HEMOGLOBIN  12.3*   HEMATOCRIT  39.6*   PLATELETCT  115*     Plan  -Morning team to consider,Iron panel,Ferratin  B12 Folate    # electrolyte disturbance   -Replete as needed      Anticipated Hospital stay:  >2 midnights        Quality Measures  Core Measures

## 2017-06-15 NOTE — PROGRESS NOTES
Report received by NOC RN. Assumed care of pt. Assessment complete. Personal items at bedside. Pt A&O x self only. Pt no c/o pain, has bilat wrist restraints-good CMS distal restraints, and has slightly elevated temp-all other VSS. Pt in no apparent signs of distress. Plan of care discussed. Call light in reach,  bed in lowest position, and pt has no further questions at this time.

## 2017-06-15 NOTE — PROGRESS NOTES
2 RN skin check complete with SHAW Escoto.  Blanching sacral redness noted-waffle cushion applied.  Small skin tear and redness on scrotum. Wound photos taken of both areas. Bruising on LUE.

## 2017-06-15 NOTE — IP AVS SNAPSHOT
" Home Care Instructions                                                                                                                  Name:Neymar Anderson  Medical Record Number:[id number]  CSN: [id number]    YOB: 1945   Age: 71 y.o.  Sex: male  HT:1.727 m (5' 8\") WT: 57.6 kg (126 lb 15.8 oz)          Admit Date: 6/15/2017     Discharge Date:   Today's Date: 6/30/2017  Attending Doctor:  Kalyan Chester M.D.                  Allergies:  Review of patient's allergies indicates no known allergies.            Discharge Instructions       Discharge Instructions    Discharged to group home by ambulance with escort. Discharged via ambulance, hospital escort: Refused.  Special equipment needed: none      Be sure to schedule a follow-up appointment with your primary care doctor or any specialists as instructed.     Discharge Plan:   Diet Plan: Discussed  Activity Level: Discussed  Confirmed Symptoms Management: Discussed  Medication Reconciliation Updated: Yes  Influenza Vaccine Indication: Not indicated: Previously immunized this influenza season and > 8 years of age    I understand that a diet low in cholesterol, fat, and sodium is recommended for good health. Unless I have been given specific instructions below for another diet, I accept this instruction as my diet prescription.   Other diet: reg.    Special Instructions: None    · Is patient discharged on Warfarin / Coumadin?   No     · Is patient Post Blood Transfusion?  No    Depression / Suicide Risk    As you are discharged from this [facility] facility, it is important to learn how to keep safe from harming yourself.    Recognize the warning signs:  · Abrupt changes in personality, positive or negative- including increase in energy   · Giving away possessions  · Change in eating patterns- significant weight changes-  positive or negative  · Change in sleeping patterns- unable to sleep or sleeping all the time   · Unwillingness or inability to " communicate  · Depression  · Unusual sadness, discouragement and loneliness  · Talk of wanting to die  · Neglect of personal appearance   · Rebelliousness- reckless behavior  · Withdrawal from people/activities they love  · Confusion- inability to concentrate     If you or a loved one observes any of these behaviors or has concerns about self-harm, here's what you can do:  · Talk about it- your feelings and reasons for harming yourself  · Remove any means that you might use to hurt yourself (examples: pills, rope, extension cords, firearm)  · Get professional help from the community (Mental Health, Substance Abuse, psychological counseling)  · Do not be alone:Call your Safe Contact- someone whom you trust who will be there for you.  · Call your local CRISIS HOTLINE 713-7805 or 914-141-3812  · Call your local Children's Mobile Crisis Response Team [place] (907) 042-2764 or www.AdaptiveBlue  · Call the toll free National Suicide Prevention Hotlines   · National Suicide Prevention Lifeline 355-417-JKSR (5440)  · National [place] Line Network 800-SUICIDE (690-0886)        Follow-up Information     1. Follow up with Bong Monson M.D. Today.    Specialty:  Internal Medicine    Contact information    [address] [id number] [phone]           Discharge Medication Instructions:    Below are the medications your physician expects you to take upon discharge:    Review all your home medications and newly ordered medications with your doctor and/or pharmacist. Follow medication instructions as directed by your doctor and/or pharmacist.    Please keep your medication list with you and share with your physician.               Medication List      CONTINUE taking these medications        Instructions    Morning Afternoon Evening Bedtime    acetaminophen 325 MG Tabs   Last time this was given:  650 mg on 6/19/2017  7:49 PM   Commonly known as:  TYLENOL        Take 650 mg by mouth every 6 hours as needed  (pain).   Dose:  650 mg                        Divalproex Sodium 125 MG Csdr   Last time this was given:  125 mg on 6/30/2017  2:23 PM   Commonly known as:  DEPAKOTE        Take 125 mg by mouth 4 times a day.   Dose:  125 mg                        multivitamin Tabs   Last time this was given:  1 Tab on 6/30/2017  9:49 AM        Take 1 Tab by mouth every day.   Dose:  1 Tab                        nystatin/triamcinolone 083941-5.1 UNIT/GM-% Crea   Last time this was given:  6/20/2017  8:47 PM   Commonly known as:  MYCOLOG        Apply  to affected area(s) 2 Times a Day. Apply to affected areas on groin and buttocks                        * quetiapine 25 MG Tabs   Last time this was given:  25 mg on 6/30/2017  9:49 AM   Commonly known as:  SEROQUEL        Take 25 mg by mouth 2 times a day. Twice a day at noon and bedtime   Dose:  25 mg                        * quetiapine 25 MG Tabs   Last time this was given:  25 mg on 6/30/2017  9:49 AM   Commonly known as:  SEROQUEL        Take 50 mg by mouth every 8 hours as needed (insomnia or confused agitation).   Dose:  50 mg                        tamsulosin 0.4 MG capsule   Last time this was given:  0.8 mg on 6/29/2017 10:12 PM   Commonly known as:  FLOMAX        Take 0.8 mg by mouth every bedtime.   Dose:  0.8 mg                        * Notice:  This list has 2 medication(s) that are the same as other medications prescribed for you. Read the directions carefully, and ask your doctor or other care provider to review them with you.      STOP taking these medications     ciprofloxacin 500 MG Tabs   Commonly known as:  CIPRO               lorazepam 1 MG Tabs   Commonly known as:  ATIVAN                       Orders for after discharge     REFERRAL TO SKILLED NURSING FACILITY    Complete by:  As directed              Instructions           Diet / Nutrition:    Follow any diet instructions given to you by your doctor or the dietician, including how much salt (sodium) you are  allowed each day.    If you are overweight, talk to your doctor about a weight reduction plan.    Activity:    Remain physically active following your doctor's instructions about exercise and activity.    Rest often.     Any time you become even a little tired or short of breath, SIT DOWN and rest.    Worsening Symptoms:    Report any of the following signs and symptoms to the doctor's office immediately:    *Pain of jaw, arm, or neck  *Chest pain not relieved by medication                               *Dizziness or loss of consciousness  *Difficulty breathing even when at rest   *More tired than usual                                       *Bleeding drainage or swelling of surgical site  *Swelling of feet, ankles, legs or stomach                 *Fever (>100ºF)  *Pink or blood tinged sputum  *Weight gain (3lbs/day or 5lbs /week)           *Shock from internal defibrillator (if applicable)  *Palpitations or irregular heartbeats                *Cool and/or numb extremities    Stroke Awareness    Common Risk Factors for Stroke include:    Age  Atrial Fibrillation  Carotid Artery Stenosis  Diabetes Mellitus  Excessive alcohol consumption  High blood pressure  Overweight   Physical inactivity  Smoking    Warning signs and symptoms of a stroke include:    *Sudden numbness or weakness of the face, arm or leg (especially on one side of the body).  *Sudden confusion, trouble speaking or understanding.  *Sudden trouble seeing in one or both eyes.  *Sudden trouble walking, dizziness, loss of balance or coordination.Sudden severe headache with no known cause.    It is very important to get treatment quickly when a stroke occurs. If you experience any of the above warning signs, call 911 immediately.                   Disclaimer         Quit Smoking / Tobacco Use:    I understand the use of any tobacco products increases my chance of suffering from future heart disease or stroke and could cause other illnesses which may shorten  my life. Quitting the use of tobacco products is the single most important thing I can do to improve my health. For further information on smoking / tobacco cessation call a Toll Free Quit Line at 1-186.847.1925 (*National Cancer [place]) or 1-806.306.8193 (American Lung Association) or you can access the web based program at www.lungusa.org.    [place] Tobacco Users Help Line:  (729) 671-5233       Toll Free: 1-462.785.2716  Quit Tobacco Program [facility] (980)975-9295    Crisis Hotline:    [place] Crisis Hotline:  4-106-WKRAFAY or 1-167.663.4486    [place] Crisis Hotline:    1-198.300.8235 or 896-164-9518    Discharge Survey:   Thank you for choosing [facility]. We hope we did everything we could to make your hospital stay a pleasant one. You may be receiving a phone survey and we would appreciate your time and participation in answering the questions. Your input is very valuable to us in our efforts to improve our service to our patients and their families.        My signature on this form indicates that:    1. I have reviewed and understand the above information.  2. My questions regarding this information have been answered to my satisfaction.  3. I have formulated a plan with my discharge nurse to obtain my prescribed medications for home.                  Disclaimer         __________________________________                     __________       ________                       Patient Signature                                                 Date                    Time

## 2017-06-15 NOTE — IP AVS SNAPSHOT
6/30/2017    Neymar Anderson  [address] [place] [address]    Dear Neymar:    [facility] wants to ensure your discharge home is safe and you or your loved ones have had all of your questions answered regarding your care after you leave the hospital.    Below is a list of resources and contact information should you have any questions regarding your hospital stay, follow-up instructions, or active medical symptoms.    Questions or Concerns Regarding… Contact   Medical Questions Related to Your Discharge  (7 days a week, 8am-5pm) Contact a Nurse Care Coordinator   [phone]   Medical Questions Not Related to Your Discharge  (24 hours a day / 7 days a week)  Contact the Nurse Health Line   [phone]    Medications or Discharge Instructions Refer to your discharge packet   or contact your [facility] Primary Care Provider   [phone]   Follow-up Appointment(s) Schedule your appointment via [employer]   or contact Scheduling [phone]   Billing Review your statement via [employer]  or contact Billing [phone]   Medical Records Review your records via [employer]   or contact Medical Records [phone]     You may receive a telephone call within two days of discharge. This call is to make certain you understand your discharge instructions and have the opportunity to have any questions answered. You can also easily access your medical information, test results and upcoming appointments via the [employer] free online health management tool. You can learn more and sign up at [employer]. For assistance setting up your [employer] account, please call [phone].    Once again, we want to ensure your discharge home is safe and that you have a clear understanding of any next steps in your care. If you have any questions or concerns, please do not hesitate to contact us, we are here for you. Thank you for choosing [facility] for your healthcare needs.    Sincerely,    Your [facility] Healthcare Team

## 2017-06-15 NOTE — PROGRESS NOTES
[facility] Interval Note    Name Neymar Anderson     1945   Age/Sex 71 y.o. male   MRN [id number]   Code Status Full Code     After 5PM or if no immediate response to page, please call for cross-coverage  Attending/Team: Dr. Ibrahim/Evangelista Call [phone] to page   1st Call - Day Intern (R1):   Dr. Newell 2nd Call - Day Sr. Resident (R2/R3):   Dr. Minaya         Chief complaint/ reason for interval visit (Primary Diagnosis)   Diarrhea, fever    Interval Problem Daily Status Update    Patient admitted overnight from [facility] unit with diarrhea for the last several days and fever, overnight patient became agitated and pulled out IV's, was placed on restraints, loose BM X 1 in adult depends, patient advocate, caretaker and relative are at bedside and updated on patient's condition, he was recently admitted at [facility] [place] with aspiration pneumonia and finished course of antibiotics, recently finished ciprofloxacin with last dose on 17 for a UTI, patient altered and unable to answer any questions, demented at baseline.    Diarrhea - Incontinent loose brown stool X 2 in depends since admit, C. Diff + w/ Nap1 +, ordered PRN rectal tube and cream for buttocks.  Fever - Low grade fever continues, tylenol ordered PRN  Dementia with agitation - Held outpatient seroquel on admit as this can cause constipation which can worsen patient's C. Diff, continued depakote and ordered PO/IV ativan for acute agitation, unable to answer questions, but according to caretaker he is worse than his baseline.  Recent pneumonia - Recently admitted at [facility] with aspiration PNA, treated with antibiotics, asymptomatic, no cough, saturating normally, passed swallow eval without issue, hold abx for aspiration as the patient was just treated and it will worsen his C. Diff, but will need to watch respiratory status carefully and have a low threshold to initiate  "abx.      Review of Systems   Unable to perform ROS: medical condition       Consultants/Specialty  None    Disposition  Guarded    Quality Measures  EKG reviewed, Labs reviewed, Medications reviewed and Radiology images reviewed  Holland catheter: No Holland        DVT prophylaxis - mechanical: SCDs  Ulcer prophylaxis: No  Antibiotics: Treating active infection/contamination beyond 24 hours perioperative coverage            Physical Exam       Filed Vitals:    06/15/17 0733 06/15/17 0736 06/15/17 0805 06/15/17 1000   BP:   108/62    Pulse: 78 99 101    Temp: 38 °C (100.4 °F)  37.9 °C (100.3 °F)    Resp: 24 27 18    Height:    1.727 m (5' 7.99\")   Weight:    66.7 kg (147 lb 0.8 oz)   SpO2: 89% 90% 98%      Body mass index is 22.36 kg/(m^2). Weight: 66.7 kg (147 lb 0.8 oz)  Oxygen Therapy:  Pulse Oximetry: 98 %, O2 (LPM): 2, O2 Delivery: Nasal Cannula    Physical Exam  Constitutional: Altered, eyes closed, unable to answer questions.      Head: Normocephalic and atraumatic.   Eyes: Pupils are equal, round, and reactive to light. Right eye exhibits no discharge. Left eye exhibits no discharge.   Neck: No JVD present. No tracheal deviation present.   Cardiovascular: Normal heart sounds.  Exam reveals no gallop and no friction rub. No murmur heard.  Pulmonary/Chest: No respiratory distress. He has no wheezes. He has no rales. He exhibits no tenderness.   Abdominal: Soft. He exhibits no distension. There is no tenderness. There is no rebound and no guarding, hyperactive bowel sounds.  Musculoskeletal: He exhibits no edema or tenderness. 3/5 motor strength in the lower extremities.  Lymphadenopathy: He has no cervical adenopathy.   Neurological: He is alert. Disoriented x 3.  Skin: Skin is warm.     Lab Data Review:      6/15/2017  1:56 PM    Recent Labs      06/15/17   0148   SODIUM  136   POTASSIUM  3.2*   CHLORIDE  107   CO2  22   BUN  15   CREATININE  0.72   MAGNESIUM  1.6   CALCIUM  7.5*       Recent Labs      06/15/17   " 0148   ALTSGPT  10   ASTSGOT  15   ALKPHOSPHAT  32   TBILIRUBIN  0.4   GLUCOSE  115*       Recent Labs      06/15/17   0400   RBC  4.09*   HEMOGLOBIN  12.3*   HEMATOCRIT  39.6*   PLATELETCT  115*       Recent Labs      06/15/17   0148  06/15/17   0400   WBC   --   17.2*   NEUTSPOLYS   --   77.80*   LYMPHOCYTES   --   8.60*   MONOCYTES   --   12.60   EOSINOPHILS   --   0.20   BASOPHILS   --   0.30   ASTSGOT  15   --    ALTSGPT  10   --    ALKPHOSPHAT  32   --    TBILIRUBIN  0.4   --            Assessment/Plan        1) C. diff diarrhea   - Several day history of diarrhea in setting of recent abx use  - WBC 17 on admit  - C. Diff PCR, NAP1 positive  - Lactic acid 1.1  - KUB shows mildly dilated air-filed loops of small bowel, ileus vs. Intermittent or partial obstruction  - Patient continues to pass loose stool, abdomen non-distended, no evidence of acute abdomen or toxic megacolon, but need to watch this patient closely as he is high risk for complications. Call surgery if any signs of acute abdomen appear.  - Blood Cx pending  - PO vanc, IV metro  - Continue IV fluids  - Nystatin/triamcinolone for perineal irritation, rectal tube PRN  - Diet ordered post swallow eval  - Isolation  - Check electrolytes in AM    2) Recent aspiration pneumonia  - Treated at [facility] for aspiration PNA, finished course of abx  - Pulmonary status stable  - Discontinue abx for PNA as the patient was recently treated and will worsen patient's active C. Diff infection  on CXR showed bilateral lower infilterate greater on the right   - Watch respiratory status carefully with low threshold for abx if his respiratory status declines  -  Tele, pulse ox  - Continue RT protocol, supplemental 02 PRN  - Got one dose of doxycycline, D/C'ed both unasyn (did not receive) and doxy  - Sputum cx pending  - Passed swallow eval, diet advanced    3) Alzheimer dementia with agitation  - Dementia worsened with acute delirium 2/2 infection  -  Continue home depakote  - Hold outpatient seroquel in the setting of C. Doff as this can cause constipation and worsen patient active C. Diff  - Ativan PO/IV PRN  - Continue soft restriants    4) Hypokalemia  - K 3.2 on admit  - Give 40mEq once  - Replete PRN  - Follow in AM    5) Anemia  - Hgb12.3 on admission  - Stable

## 2017-06-15 NOTE — ED PROVIDER NOTES
"ED Provider Note    Scribed for Paras Garcias M.D. by Karley Chaudhari. 6/15/2017, 2:54 AM.    Primary care provider: Bong Monson M.D.  Means of arrival: Ambulance  History obtained from: Patient  History limited by: Patient's baseline dementia and Alzheimer's.     CHIEF COMPLAINT  Chief Complaint   Patient presents with   • Fever     Pt spiked a fever today with no relief after given Tylenol around 2300. Pt has Dementia/Alzheimers and per reports is at baseline but is not a good historian. FS was 122. Pt received 500cc of NS en route.    • Diarrhea     x2 days and possibly being treated for C diff.        HPI  Neymar Anderson is a 71 y.o. male with a history of dementia who presents to the Emergency Department with fever and diarrhea onset two days ago. The patient was treated with Tylenol at his home for Alzheimer's patients. He reports that he is ambulatory at baseline. The patient denies headache, chest pain, abdominal pain, or shortness of breath. Staff at the home has been unhelpful, stating that he might be on Cipro and might have C-diff.     Further history of present illness cannot be obtained due to the patient's baseline dementia and Alzheimer's.     REVIEW OF SYSTEMS  See HPI,  Negative for headache, chest pain, abdominal pain, or shortness of breath.  C    Further ROS cannot be obtained due to the patient's baseline dementia and Alzheimer's.     PAST MEDICAL HISTORY   Dementia and Alzheimer's    SURGICAL HISTORY  patient denies any surgical history    SOCIAL HISTORY  Lives in home for Alzheimer's patients ([facility])    FAMILY HISTORY  No pertinent family history    CURRENT MEDICATIONS  Reviewed.  See Encounter Summary.     ALLERGIES  Allergies no known allergies    PHYSICAL EXAM  VITAL SIGNS: BP 98/62 mmHg  Pulse 106  Temp(Src) 37.4 °C (99.3 °F)  Resp 18  Ht 1.727 m (5' 8\")  Wt 79.379 kg (175 lb)  BMI 26.61 kg/m2  SpO2 97%  Constitutional: Awake, alert in no apparent distress.  HENT: " Normocephalic, Bilateral external ears normal. Nose normal. Dry oropharynx  Eyes: Conjunctiva normal, non-icteric, EOMI.    Thorax & Lungs: Easy unlabored respirations, Coarse at bases bilaterally.   Cardiovascular: Tachycardic, Regular rhythm, No murmurs, rubs or gallops.  Abdomen:  Soft, nontender, no masses   Skin: Visualized skin is  Dry, No erythema, No rash.   Extremities:   No cyanosis, clubbing or edema.  Neurologic: Alert, Grossly non-focal.   Psychiatric: Normal affect, Normal mood    DIAGNOSTIC STUDIES / PROCEDURES     RADIOLOGY  DX-CHEST-PORTABLE (1 VIEW)   Final Result         1.  Hazy bilateral lower lobe infiltrates, greater on the right.        The radiologist's interpretation of all radiological studies have been reviewed by me.    COURSE & MEDICAL DECISION MAKING  Pertinent Labs & Imaging studies reviewed. (See chart for details)    Differential diagnoses include but are not limited to: C. diff diarrhea, pneumonia, UTI, sepsis    2:54 AM - The images of the patient's radiology studies were independently reviewed by me.    2:59 Patient seen and examined at bedside. Patient will be treated with Rocephin 1mg IV, Zithromax 500mg IV, IV fluids secondary to dry oropharynx. Ordered Chest X-ray, Lactic acid, CBC, CMP, Urinalysis, Urine culture, blood culture, Estimated GFR to evaluate his symptoms.     4:45 AM Paged [facility] Team.    4:53 AM - I discussed the patient's case and the above findings with [facility] team who is aware of the patient and will evaluate.    5:00AM- not candidate for ICU admission per [facility] team , [facility] paged.      5:23 AM- [facility] agrees to evaluate for admission.     Decision Making:  This is a 71 y.o. year old male who presents with history of fever, tachycardia and hypotension. The history provided is not helpful. The patient does have apparent sepsis, likely from a right lower lobe pneumonia. He has a leukocytosis, tachycardia and hypotension. He was given aggressive fluid  resuscitation with improvement in his blood pressure and heart rate. The patient will be admitted for IV antibiotics and fluid resuscitation. He is a poor candidate for outpatient treatment secondary to dementia. He also will be a challenge in the inpatient service as he is continually pulling at lines and is not cognizant of the treatment provided.    The patient will be admitted to the [facility] team in guarded condition.    DISPOSITION:  Patient will be admitted to [facility] team in guarded condition.    FINAL IMPRESSION  1. Pneumonia of right lower lobe due to infectious organism    2. Alzheimer's dementia without behavioral disturbance, unspecified timing of dementia onset          Karley COSTELLO (Angelyibkarthikeyan), am scribing for, and in the presence of, Paras Garcias M.D..    Electronically signed by: Karley Chaudhari (Chad), 6/15/2017    Paras COSTELLO M.D. personally performed the services described in this documentation, as scribed by Karley Chaudhari in my presence, and it is both accurate and complete.    The note accurately reflects work and decisions made by me.  Paras Garcias  6/15/2017  7:09 AM

## 2017-06-15 NOTE — ED NOTES
"Pt BIB EMS for   Chief Complaint   Patient presents with   • Fever     Pt spiked a fever today with no relief after given Tylenol around 2300. Pt has Dementia/Alzheimers and per reports is at baseline but is not a good historian. FS was 122. Pt received 500cc of NS en route.    • Diarrhea     x2 days and possibly being treated for C diff.    BP 98/62 mmHg  Pulse 98  Temp(Src) 37.4 °C (99.3 °F)  Resp 18  Ht 1.727 m (5' 8\")  Wt 79.379 kg (175 lb)  BMI 26.61 kg/m2  SpO2 97%      "

## 2017-06-15 NOTE — ED NOTES
Report called to [facility].  Pt transported to floor via tele transporter.  All belongings up with pt.

## 2017-06-15 NOTE — PROGRESS NOTES
Medication Reconciliation    Reviewed MAR from [facility].    Completed 10 day course of ciprofloxacin on 6/13/17.  Allergy information updated.     Renee Lopes, PharmD, BCPS

## 2017-06-15 NOTE — ED NOTES
Report received, assuming care.  Pt in 2 pt soft medical restraints.  Pt incontinent of liquid stool.  Pt cleaned, bedding changed, new brief applied.  Redness noted to buttocks.  Pt A&Ox1.  Pt has admit orders, awaiting a bed assignment.

## 2017-06-15 NOTE — IP AVS SNAPSHOT
" <p align=\"LEFT\"><IMG SRC=\"[email or web address]\" alt=\"Image\" WIDTH=\"50%\" HEIGHT=\"200\" BORDER=\"\"></p>                   Name:Neymar Anderson  Medical Record Number:[id number]  CSN: [id number]    YOB: 1945   Age: 71 y.o.  Sex: male  HT:1.727 m (5' 8\") WT: 57.6 kg (126 lb 15.8 oz)          Admit Date: 6/15/2017     Discharge Date:   Today's Date: 6/30/2017  Attending Doctor:  Kalyan Chester M.D.                  Allergies:  Review of patient's allergies indicates no known allergies.          Follow-up Information     1. Follow up with Bong Monson M.D. Today.    Specialty:  Internal Medicine    Contact information    [address] [address]  [phone]           Medication List      Take these Medications        Instructions    acetaminophen 325 MG Tabs   Commonly known as:  TYLENOL    Take 650 mg by mouth every 6 hours as needed (pain).   Dose:  650 mg       Divalproex Sodium 125 MG Csdr   Commonly known as:  DEPAKOTE    Take 125 mg by mouth 4 times a day.   Dose:  125 mg       multivitamin Tabs    Take 1 Tab by mouth every day.   Dose:  1 Tab       nystatin/triamcinolone 850655-0.1 UNIT/GM-% Crea   Commonly known as:  MYCOLOG    Apply  to affected area(s) 2 Times a Day. Apply to affected areas on groin and buttocks       * quetiapine 25 MG Tabs   Commonly known as:  SEROQUEL    Take 25 mg by mouth 2 times a day. Twice a day at noon and bedtime   Dose:  25 mg       * quetiapine 25 MG Tabs   Commonly known as:  SEROQUEL    Take 50 mg by mouth every 8 hours as needed (insomnia or confused agitation).   Dose:  50 mg       tamsulosin 0.4 MG capsule   Commonly known as:  FLOMAX    Take 0.8 mg by mouth every bedtime.   Dose:  0.8 mg       * Notice:  This list has 2 medication(s) that are the same as other medications prescribed for you. Read the directions carefully, and ask your doctor or other care provider to review them with you.      "

## 2017-06-15 NOTE — SENIOR ADMIT NOTE
Mr. Anderson is a 71 y.o. male with a history of dementia. Poor historian. Hx obtained from chart review and discussing ER physician.  Patient has  fever and diarrhea. The patient was treated with Tylenol at his home for Alzheimer's patients. He reports that he is ambulatory at baseline.   The patient hx unreliable he says no to ROS. Per ED note, patient was on ciprofloxacin.     He was found to be demented, tachycardic, tachypneic, hypotensive.  WBC 17.2, Hb 12.3, , K 3.2, lactic acid 1.1  UA: unremarkable, except for ketones 10.  CXR: Hazy bilateral lower lobe infiltrates, greater on the right.    Assessment and Plan:    Diarrhea: hx of recent cipro, high risk for C Diff. Empiric Vancomycin PO. Stool C diff PCR. Stool studies. Optimize electrolyte. IV fluid. Contact isolation.    Query pneumonia: health care associated v.s. Community acquired v.s. Aspiration. RT/O2 protocol. Ceftriaxone x1 in ED. Unasyn, doxycycline. Fall/ aspiration/ seizure precaution. RT/O2 protocol. Blood/sputum cultures. Swallow eval.    Query altered mentation: unknown baseline. Hx of dementia. Resume home quetiapine. Treat infection.  Anemia: monitor. Consider anemia work-up.    Full code  Lovenox for DVT prophylaxis    For further details, please refer to Dr. Chatman's H&P.

## 2017-06-15 NOTE — THERAPY
Speech Language Therapy Clinical Swallow Evaluation completed.  Functional Status: Patient with geriatric manager, Elida, and legal guardian, Damon, both present at bedside for evaluation and provided PLOF and PLS information, as patient has moderate to severe dementia per their report, and is a poor historian at this time. Per Elida and Damon, patient was recently admitted to [facility] for a couple weeks, developed aspiration pneumonia and was being seen by SLP at [place] and at [facility]. Elida reported that patient was on Dys1/NTL diet in [place] and initially when he returned to [facility], but was recently upgrade to Dys2-3/thin liquids approximately 5-7 days ago. Elida also reported that d/t moderate to severe dementia and associated behavioral issues, patient sees psych and neuro as outpatient for medications. Patient intermittently agitated, orally aversive, and only minimally following simple commands during evaluation. Patient refused PO trials of ice chips. PO trials of NTL via tsp, cup sip, and straw, and applesauce and pudding resulted in no overt s/sx of aspiration. However, PO trials were minimal and initiation of swallow trigger was noted to be delayed with weak laryngeal elevation upon palpation. Damon and Elida also reported patient is often orally aversive and has had poor PO intake with unplanned 13 pound weight loss recently. Patient had much improved PO intake when being fed by his guardian, Damon. At this time, recommend patient start NTFL diet with 1:1 feeding and strict use of swallow precautions. Patient would appear to benefit from supplements d/t poor PO intake. RN aware. SLP to follow closely. Thank you for the consult.     Recommendations - Diet: 1.)  Nectar Thick Full Liquid (please encourage PO intake)    2.) Patient would benefit from supplements d/t reportedly poor PO intake   3.) Orders received for cognitive evaluation; cognitive evaluation not completed at this  "time as it is not appropriate, as patient has hx of moderate-severe dementia w/ psych and neuro following outpatient, per caregivers.                           Strategies: Strict 1:1 feeding  and Head of Bed at 90 Degrees                          Medication Administration: Medication Administration: Crush all Medications in Puree  Plan of Care: Will benefit from Speech Therapy 3 times per week  Post-Acute Therapy: Discharge to a transitional care facility for continued skilled therapy services.    See \"Rehab Therapy-Acute\" Patient Summary Report for complete documentation.   "

## 2017-06-15 NOTE — CARE PLAN
Problem: Safety  Goal: Will remain free from falls  Intervention: Implement fall precautions    06/15/17 0805   OTHER   Environmental Precautions Treaded Slipper Socks on Patient;Personal Belongings, Wastebasket, Call Bell etc. in Easy Reach;Report Given to Other Health Care Providers Regarding Fall Risk;Bed in Low Position;Communication Sign for Patients & Families;Mobility Assessed & Appropriate Sign Placed   IV Pole on Same Side of Bed as Bathroom Yes   Bedrails Bedrails Closest to Bathroom Down   Chair/Bed Strip Alarm Yes - Alarm On   Bed Alarm (Built in - for ICU ONLY) Yes - Alarm On           Problem: Infection  Goal: Will remain free from infection  Intervention: Assess signs and symptoms of infection  Pt lab values monitored, VS monitored. No new s/s infection.

## 2017-06-15 NOTE — ED NOTES
After multiple attempts of pt trying to get out of bed and pulling out 2 PIV's. Pt was placed in soft restraints and ERP was notified. Another PIV was started and new purple top was drawn and sent. Pt is tremulous and cussing at staff at this time. Tried multiple attempts to reorient Pt with no success at this time. VSS. Will continue to monitor.

## 2017-06-15 NOTE — ED NOTES
Pt incontinent of stool and urine. Pt was cleaned and placed on new sheets. Pt had new brief placed. Stool sample was collected. 2nd RN at  to help with straight cath. Urine was collected and sent.

## 2017-06-16 ENCOUNTER — APPOINTMENT (OUTPATIENT)
Dept: RADIOLOGY | Facility: MEDICAL CENTER | Age: 72
DRG: 872 | End: 2017-06-16
Attending: STUDENT IN AN ORGANIZED HEALTH CARE EDUCATION/TRAINING PROGRAM
Payer: COMMERCIAL

## 2017-06-16 LAB
ALBUMIN SERPL BCP-MCNC: 2.3 G/DL (ref 3.2–4.9)
ALBUMIN/GLOB SERPL: 1 G/DL
ALP SERPL-CCNC: 28 U/L (ref 30–99)
ALT SERPL-CCNC: 9 U/L (ref 2–50)
ANION GAP SERPL CALC-SCNC: 6 MMOL/L (ref 0–11.9)
AST SERPL-CCNC: 14 U/L (ref 12–45)
BASOPHILS # BLD AUTO: 0 % (ref 0–1.8)
BASOPHILS # BLD: 0 K/UL (ref 0–0.12)
BILIRUB SERPL-MCNC: 0.5 MG/DL (ref 0.1–1.5)
BUN SERPL-MCNC: 8 MG/DL (ref 8–22)
CALCIUM SERPL-MCNC: 7.8 MG/DL (ref 8.5–10.5)
CHLORIDE SERPL-SCNC: 109 MMOL/L (ref 96–112)
CO2 SERPL-SCNC: 25 MMOL/L (ref 20–33)
CREAT SERPL-MCNC: 0.69 MG/DL (ref 0.5–1.4)
EKG IMPRESSION: NORMAL
EOSINOPHIL # BLD AUTO: 0 K/UL (ref 0–0.51)
EOSINOPHIL NFR BLD: 0 % (ref 0–6.9)
ERYTHROCYTE [DISTWIDTH] IN BLOOD BY AUTOMATED COUNT: 48.4 FL (ref 35.9–50)
GFR SERPL CREATININE-BSD FRML MDRD: >60 ML/MIN/1.73 M 2
GLOBULIN SER CALC-MCNC: 2.4 G/DL (ref 1.9–3.5)
GLUCOSE SERPL-MCNC: 119 MG/DL (ref 65–99)
HCT VFR BLD AUTO: 37.9 % (ref 42–52)
HGB BLD-MCNC: 12.5 G/DL (ref 14–18)
LACTATE BLD-SCNC: 1.5 MMOL/L (ref 0.5–2)
LYMPHOCYTES # BLD AUTO: 1.03 K/UL (ref 1–4.8)
LYMPHOCYTES NFR BLD: 4.4 % (ref 22–41)
MAGNESIUM SERPL-MCNC: 1.7 MG/DL (ref 1.5–2.5)
MANUAL DIFF BLD: NORMAL
MCH RBC QN AUTO: 30.1 PG (ref 27–33)
MCHC RBC AUTO-ENTMCNC: 33 G/DL (ref 33.7–35.3)
MCV RBC AUTO: 91.3 FL (ref 81.4–97.8)
MONOCYTES # BLD AUTO: 1.43 K/UL (ref 0–0.85)
MONOCYTES NFR BLD AUTO: 6.1 % (ref 0–13.4)
MORPHOLOGY BLD-IMP: NORMAL
NEUTROPHILS # BLD AUTO: 21.03 K/UL (ref 1.82–7.42)
NEUTROPHILS NFR BLD: 81.7 % (ref 44–72)
NEUTS BAND NFR BLD MANUAL: 7.8 % (ref 0–10)
NRBC # BLD AUTO: 0 K/UL
NRBC BLD AUTO-RTO: 0 /100 WBC
PHOSPHATE SERPL-MCNC: 2.4 MG/DL (ref 2.5–4.5)
PLATELET # BLD AUTO: 159 K/UL (ref 164–446)
PLATELET BLD QL SMEAR: NORMAL
PMV BLD AUTO: 10 FL (ref 9–12.9)
POTASSIUM SERPL-SCNC: 3.5 MMOL/L (ref 3.6–5.5)
PROT SERPL-MCNC: 4.7 G/DL (ref 6–8.2)
RBC # BLD AUTO: 4.15 M/UL (ref 4.7–6.1)
RBC BLD AUTO: NORMAL
SODIUM SERPL-SCNC: 140 MMOL/L (ref 135–145)
TROPONIN I SERPL-MCNC: 0.03 NG/ML (ref 0–0.04)
WBC # BLD AUTO: 23.5 K/UL (ref 4.8–10.8)

## 2017-06-16 PROCEDURE — 93005 ELECTROCARDIOGRAM TRACING: CPT | Performed by: INTERNAL MEDICINE

## 2017-06-16 PROCEDURE — 700117 HCHG RX CONTRAST REV CODE 255: Performed by: STUDENT IN AN ORGANIZED HEALTH CARE EDUCATION/TRAINING PROGRAM

## 2017-06-16 PROCEDURE — 93010 ELECTROCARDIOGRAM REPORT: CPT | Performed by: INTERNAL MEDICINE

## 2017-06-16 PROCEDURE — 92526 ORAL FUNCTION THERAPY: CPT

## 2017-06-16 PROCEDURE — 700102 HCHG RX REV CODE 250 W/ 637 OVERRIDE(OP): Performed by: STUDENT IN AN ORGANIZED HEALTH CARE EDUCATION/TRAINING PROGRAM

## 2017-06-16 PROCEDURE — A9270 NON-COVERED ITEM OR SERVICE: HCPCS | Performed by: STUDENT IN AN ORGANIZED HEALTH CARE EDUCATION/TRAINING PROGRAM

## 2017-06-16 PROCEDURE — 700101 HCHG RX REV CODE 250: Performed by: INTERNAL MEDICINE

## 2017-06-16 PROCEDURE — 700102 HCHG RX REV CODE 250 W/ 637 OVERRIDE(OP): Performed by: INTERNAL MEDICINE

## 2017-06-16 PROCEDURE — 83605 ASSAY OF LACTIC ACID: CPT

## 2017-06-16 PROCEDURE — A9270 NON-COVERED ITEM OR SERVICE: HCPCS | Performed by: INTERNAL MEDICINE

## 2017-06-16 PROCEDURE — 770020 HCHG ROOM/CARE - TELE (206)

## 2017-06-16 PROCEDURE — 99233 SBSQ HOSP IP/OBS HIGH 50: CPT | Performed by: INTERNAL MEDICINE

## 2017-06-16 PROCEDURE — 700105 HCHG RX REV CODE 258: Performed by: STUDENT IN AN ORGANIZED HEALTH CARE EDUCATION/TRAINING PROGRAM

## 2017-06-16 PROCEDURE — 74177 CT ABD & PELVIS W/CONTRAST: CPT

## 2017-06-16 PROCEDURE — 36415 COLL VENOUS BLD VENIPUNCTURE: CPT

## 2017-06-16 PROCEDURE — 700111 HCHG RX REV CODE 636 W/ 250 OVERRIDE (IP): Performed by: INTERNAL MEDICINE

## 2017-06-16 RX ORDER — POTASSIUM CHLORIDE 1.5 G/1.58G
40 POWDER, FOR SOLUTION ORAL ONCE
Status: COMPLETED | OUTPATIENT
Start: 2017-06-16 | End: 2017-06-16

## 2017-06-16 RX ORDER — POTASSIUM CHLORIDE 20 MEQ/1
20 TABLET, EXTENDED RELEASE ORAL
Status: COMPLETED | OUTPATIENT
Start: 2017-06-16 | End: 2017-06-16

## 2017-06-16 RX ORDER — POTASSIUM CHLORIDE 20 MEQ/1
40 TABLET, EXTENDED RELEASE ORAL ONCE
Status: COMPLETED | OUTPATIENT
Start: 2017-06-16 | End: 2017-06-16

## 2017-06-16 RX ADMIN — THERA TABS 1 TABLET: TAB at 10:25

## 2017-06-16 RX ADMIN — METRONIDAZOLE 500 MG: 500 INJECTION, SOLUTION INTRAVENOUS at 21:56

## 2017-06-16 RX ADMIN — VANCOMYCIN HYDROCHLORIDE 125 MG: 10 INJECTION, POWDER, LYOPHILIZED, FOR SOLUTION INTRAVENOUS at 17:22

## 2017-06-16 RX ADMIN — DIVALPROEX SODIUM 125 MG: 125 CAPSULE, COATED PELLETS ORAL at 17:22

## 2017-06-16 RX ADMIN — LORAZEPAM 1 MG: 2 INJECTION INTRAMUSCULAR; INTRAVENOUS at 17:22

## 2017-06-16 RX ADMIN — LORAZEPAM 1 MG: 2 INJECTION INTRAMUSCULAR; INTRAVENOUS at 02:24

## 2017-06-16 RX ADMIN — METRONIDAZOLE 500 MG: 500 INJECTION, SOLUTION INTRAVENOUS at 05:47

## 2017-06-16 RX ADMIN — DIVALPROEX SODIUM 125 MG: 125 CAPSULE, COATED PELLETS ORAL at 01:32

## 2017-06-16 RX ADMIN — POTASSIUM CHLORIDE 40 MEQ: 1.5 POWDER, FOR SOLUTION ORAL at 01:32

## 2017-06-16 RX ADMIN — SODIUM CHLORIDE: 9 INJECTION, SOLUTION INTRAVENOUS at 01:32

## 2017-06-16 RX ADMIN — POTASSIUM CHLORIDE 20 MEQ: 1500 TABLET, EXTENDED RELEASE ORAL at 17:22

## 2017-06-16 RX ADMIN — SODIUM CHLORIDE: 9 INJECTION, SOLUTION INTRAVENOUS at 12:55

## 2017-06-16 RX ADMIN — VANCOMYCIN HYDROCHLORIDE 125 MG: 10 INJECTION, POWDER, LYOPHILIZED, FOR SOLUTION INTRAVENOUS at 01:32

## 2017-06-16 RX ADMIN — VANCOMYCIN HYDROCHLORIDE 125 MG: 10 INJECTION, POWDER, LYOPHILIZED, FOR SOLUTION INTRAVENOUS at 10:25

## 2017-06-16 RX ADMIN — IOHEXOL 100 ML: 350 INJECTION, SOLUTION INTRAVENOUS at 02:52

## 2017-06-16 RX ADMIN — DIVALPROEX SODIUM 125 MG: 125 CAPSULE, COATED PELLETS ORAL at 12:56

## 2017-06-16 RX ADMIN — METRONIDAZOLE 500 MG: 500 INJECTION, SOLUTION INTRAVENOUS at 12:54

## 2017-06-16 NOTE — PROGRESS NOTES
Patient condition improving after rectal tube placement, and martini catheter.  CT abdomen showed severe colitis  Will continue current medical managment

## 2017-06-16 NOTE — ASSESSMENT & PLAN NOTE
-Likely 2/2 C diff colitis  -17.2 on admission, trended down to 10.7 today  -Afebrile for 48h  -No s/s active pneumonia, not treating for this  -Continuing PO vanco

## 2017-06-16 NOTE — CONSULTS
INFECTIOUS DISEASE INITIAL NOTE    Referring Physician  Dr. Ibrahim     Reason for Consult:  Treat possible pneumonia. Optimizing medical therapy for severe C diff colitis.      History of Present Illness:   This is a  72 yo gentleman with a history of severe dementia, living an group home, admitted yesterday with diarrhea, fever and sepsis and diagnosed with severe C diff colitis. He reported watery diarrhea, a history of recent use of Ciprofloxacin, fever and chills. He was febrile, tachycardic, tachyonic and marginally hypotensive on admission. His C diff test was positive for  PCR, toxin assay and NAP 2 strain. His abdominal Ct was consistant with severe colitis. He was started on PO vancomycin and IV flagyl for severe C diff colitis.  There was also a concern about an underlying pneumonia. He has some RLL infiltrates vs atelectasis present on both his admission CXR and abdominal CT scan. Pt is incoherent but denied cough or chest pain. Per his RN he was not noted to have cough or SOB and he was able to be weaned down to room air saturation.    Medical/ Surgical History:  Alzheimer dementia  Chronic anemia      Family History:  Unable to obtain       Social History:  Lives in a group home. Unable to obtain the rest due to severe dementia.    ROS:  Constitutional: Denied Fatigue, fever or wt changes  Cardiovascular: Denies chest pain or palpitations  Respiratory: Denied cough or SOB  Gastrointestinal: Denies abdominal pain, N, V or changes to his bowel habits   Genitourinary: Denies dysuria or hematuria   Musculoskeletal: Denies new joints or muscle pains. Denies skin rash.       Physical Exam:  Constitutional: Altered, only oriented to himself, soft restrains on wrists, seems restless and constantly moving.  Holland in place, rectal tube slipped off.  HENMT: Normocephalic, Atraumatic,Dry mucous membranes No oral exudates.  Cardiovascular: Normal heart rate, Normal rhythm, No murmurs, No rubs, No gallops. Extremities  "with intact distal pulses, no cyanosis, clubbing or edema.  Lungs: Respiratory effort is normal. Diminished breath sounds, no rales, no rhonchi, no wheezing.   Abdomen: Bowel sounds normal, Soft, + bilateral lower quadrant tenderness No guarding, No rebound, No palpable splenomegaly or masses  Skin: Warm, Dry, no rash  Neurologic: Alert & oriented x 1,  No focal deficits noted    Medications:  Current Facility-Administered Medications   Medication Dose   • potassium chloride SA (Kdur) tablet 20 mEq  20 mEq   • Respiratory Care per Protocol     • NS infusion     • acetaminophen (TYLENOL) tablet 650 mg  650 mg   • vancomycin 50 mg/mL oral soln 125 mg  125 mg   • Divalproex Sodium (DEPAKOTE) capsule 125 mg  125 mg   • metronidazole (FLAGYL) IVPB 500 mg  500 mg   • nystatin/triamcinolone (MYCOLOG) 180974-9.1 UNIT/GM-% cream     • multivitamin (THERAGRAN) tablet 1 Tab  1 Tab   • lorazepam (ATIVAN) injection 0.5 mg  0.5 mg    Or   • lorazepam (ATIVAN) injection 1 mg  1 mg    Or   • lorazepam (ATIVAN) injection 2 mg  2 mg       Blood pressure 99/51, pulse 91, temperature 36.7 °C (98.1 °F), resp. rate 18, height 1.727 m (5' 7.99\"), weight 66.7 kg (147 lb 0.8 oz), SpO2 98 %.    Recent Labs      06/15/17   2348   TROPONINI  0.03     Recent Labs      06/15/17   0400  06/15/17   2348   WBC  17.2*  23.5*   RBC  4.09*  4.15*   HEMOGLOBIN  12.3*  12.5*   HEMATOCRIT  39.6*  37.9*   MCV  96.8  91.3   MCH  30.1  30.1   MCHC  31.1*  33.0*   RDW  52.6*  48.4   PLATELETCT  115*  159*   MPV  9.9  10.0     Recent Labs      06/15/17   0148  06/15/17   2348   SODIUM  136  140   POTASSIUM  3.2*  3.5*   CHLORIDE  107  109   CO2  22  25   GLUCOSE  115*  119*   BUN  15  8       Patient Active Problem List    Diagnosis Date Noted   • C. difficile diarrhea 06/15/2017   • Late onset Alzheimer's disease with behavioral disturbance 06/15/2017   • Hypokalemia 06/15/2017   • Leukocytosis 06/15/2017   • Anemia 06/15/2017       Assessment:   1. Severe C " diff colitis: Watery diarrhea, recent Cipro use per H&P, SIRS on admission, C diff + PCR, Toxin and NAP 1 strain. Started on PO vanc and IV flagyl since admission. WBCs increased overnight.  2. Sepsis secondary to #1  3. Possible RLL haziness on CXR. Likely atelectasis. No clinical symptoms or signs of pneumonia.    Plan/Recommendations:  - Continue on PO vancomycin and IV flagly for C diff treatment  - NAP 1 strain has a high mortality but given his advanced age and dementia will not recommend surgery  - No need to add antibiotics. Pt has no clinical symptoms or signs of pneumonia      Patient was seen and examined with Dr. Mejia who agreed with the aforementioned assessment and plan    Dr. Moni Villasenor  Internal Medicine Resident. PGY-3

## 2017-06-16 NOTE — ASSESSMENT & PLAN NOTE
- Severe C.diff/NAP-1 strain positive  - Several day history of diarrhea with worsening mentation in setting of recent abx use  - Leukocytosis on admission, resolved  - Blood Cx: No growth at 5 days  - CT Abd 6/16: Small bilateral pleural effusions with atelectasis versus consolidation. Changes compatible with severe colitis.  - KUB 6/17 on admission: mildly dilated air-filled loops of small bowel, ileus vs. Intermittent or partial obstruction.    - KUB 6/19: Diffuse air-filled distention of small bowel, appearance suggests ileus or enteritis  - Surgery recs: patient is now stable, continue medical management, will sign off  - ID recs: D/C IV metronidazole, continue PO vanco for total of 14 days, signed off    - Patient has missed several doses of PO vanco due to agitation and refusal, DPOA made aware and agreed to insert a Cortrak if patient continues to refuse. Still refusing a dose every now and again. If he refuses any more doses will place a cortrak (already ordered) as he cannot miss any more doses.  - Abdominal exam benign, clinically stable but condition remains guarded as this is a severe strain.    - Passed 5-6 loose mucous stools overnight  - Restarted IV metronidazole after fever spike on 6/19.              Plan:              -Continue PO Vancomycin day 9/14 and IV metronidazole              -Restart IV fluids as patient hypotensive this AM              -Nystatin/triamcinolone for perineal irritation              -Frequent contact with DPLAWSON Partida and o/p care coordinatorElida                -Will need PT/OT fosteral per SW request for discharge planning

## 2017-06-16 NOTE — PROGRESS NOTES
Recd Pt asleep easily aroused VSS. Cargiver at bedside. Discussed plans with caregiver as far as strict aspiration precautions, HOB 90 degrees, have Pt clear throat and tilt head forward with every bite. Teach back done well with caregiver. Restraints let loose ROM done no skin issues noted. Pt was given water to drink with necar thick water. No aspiration noted. Rectal tube intact and working well. Excoriated bottom noted and barrier cream applied. No distress at this time.  Ivis Camacho RN, BSN

## 2017-06-16 NOTE — ASSESSMENT & PLAN NOTE
12.3 on admission, stable since.  -May be 2/2 iron deficiency vs B12/folate deficiency vs ACD. May also be 2/2 depakote side effect.  -Can pursue these causes after acute stabilization of C diff colitis. Will continue fluids and multivitamins.

## 2017-06-16 NOTE — PROGRESS NOTES
[facility] Internal Medicine Interval Note    Name Neymar Anderson     1945   Age/Sex 71 y.o. male   MRN [id number]   Code Status Full     After 5PM or if no immediate response to page, please call for cross-coverage  Attending/Team: Dr. Ibrahim/Evangelista Call [phone] to page   1st Call - Day Intern (R1):   Dr. Newell 2nd Call - Day Sr. Resident (R2/R3):   Dr. Minaya         Chief complaint/ reason for interval visit (Primary Diagnosis)   Diarrhea, fever/C diff colitis    Interval Problem Daily Status Update      Active Problems:    C. difficile diarrhea: Hypotensive, tachycardic, spiking fevers up to 102.3 overnight. Has stabilized since but condition is still guarded. Rectal tube put in, continues to have watery diarrhea. Could not assess subjective symptoms as patient is altered. Will continue abx. ID consulted, to continue antibiotics flagyl and vanc. Did not think pt has PNA and so no antibiotics. Surgery consulted for evaluation of  Possible C.diff complication.       Late onset Alzheimer's disease with behavioral disturbance: Intermittent agitation overnight, received ativan x 1. Continuing depakote while holding seroquel. Was worse than baseline but started to improve later in the morning, per caregiver. Still unable to fully answer questions.       Hypokalemia: ECGs have shown non specific T wave abnormalities in inferior leads. Stable overnight, will replete.      Leukocytosis: Trending up today, fevers overnight. Continuing to treat C diff colitis.      Anemia: Hgb stable overnight.    Resolved Problems:    Pneumonia POA: Unknown      Review of Systems   Unable to perform ROS: dementia       Consultants/Specialty  None    Disposition  Guarded. Inpatient for further management of C diff colitis.    Quality Measures  EKG reviewed, Labs reviewed, Medications reviewed and Radiology images reviewed  Holland catheter: Urinary Tract Retention or Urinary Tract  Obstruction        DVT prophylaxis - mechanical: SCDs  Ulcer prophylaxis: No  Antibiotics: Treating active infection/contamination beyond 24 hours perioperative coverage            Physical Exam       Filed Vitals:    06/16/17 0140 06/16/17 0400 06/16/17 0558 06/16/17 0831   BP:  116/68 103/54 103/52   Pulse:  56 93 89   Temp: 37.8 °C (100 °F) 37.3 °C (99.1 °F) 36.8 °C (98.2 °F) 36.7 °C (98 °F)   Resp:  18 18 18   Height:       Weight:       SpO2:  91% 98% 98%     Body mass index is 22.36 kg/(m^2).    Oxygen Therapy:  Pulse Oximetry: 98 %, O2 (LPM): 1, O2 Delivery: Nasal Cannula    Physical Exam   Constitutional:  Non-toxic appearance. No distress.   HENT:   Head: Normocephalic and atraumatic.   Eyes: EOM are normal.   Neck: Normal range of motion. No JVD present.   Cardiovascular: Normal rate, regular rhythm, normal heart sounds and intact distal pulses.    No murmur heard.  Pulmonary/Chest: Effort normal. No respiratory distress. He has no wheezes.   Crackles in bilateral lower lobes   Abdominal: Soft. He exhibits no mass. There is no rebound and no guarding.   Hyperactive bowel sounds. Difficult to assess tenderness on palpation due to altered mental status, was not reacting to deep palpation.   Musculoskeletal: He exhibits no edema.   Arm restraints, spontaneously moving lower extremities.   Skin: Skin is dry. No erythema.   Cool to touch in extremities.   Psychiatric:   Altered mental status. Responds but cannot answer questions appropriately.          Lab Data Review:      6/16/2017  1:28 PM    Recent Labs      06/15/17   0148  06/15/17   2348   SODIUM  136  140   POTASSIUM  3.2*  3.5*   CHLORIDE  107  109   CO2  22  25   BUN  15  8   CREATININE  0.72  0.69   MAGNESIUM  1.6  1.7   PHOSPHORUS   --   2.4*   CALCIUM  7.5*  7.8*       Recent Labs      06/15/17   0148  06/15/17   2348   ALTSGPT  10  9   ASTSGOT  15  14   ALKPHOSPHAT  32  28*   TBILIRUBIN  0.4  0.5   GLUCOSE  115*  119*       Recent Labs       06/15/17   0400  06/15/17   2348   RBC  4.09*  4.15*   HEMOGLOBIN  12.3*  12.5*   HEMATOCRIT  39.6*  37.9*   PLATELETCT  115*  159*       Recent Labs      06/15/17   0148  06/15/17   0400  06/15/17   2348   WBC   --   17.2*  23.5*   NEUTSPOLYS   --   77.80*  81.70*   LYMPHOCYTES   --   8.60*  4.40*   MONOCYTES   --   12.60  6.10   EOSINOPHILS   --   0.20  0.00   BASOPHILS   --   0.30  0.00   ASTSGOT  15   --   14   ALTSGPT  10   --   9   ALKPHOSPHAT  32   --   28*   TBILIRUBIN  0.4   --   0.5           Assessment/Plan     1) C. diff diarrhea   - Several day history of diarrhea in setting of recent abx use.  - WBC 17 on admit, elevated to 23.5 and fever spikes overnight   - C. Diff PCR, NAP1 positive  - KUB shows mildly dilated air-filed loops of small bowel, ileus vs. Intermittent or partial obstruction  - Patient continues to pass loose stool, abdomen non-distended, no evidence of acute abdomen or toxic megacolon, spiked fever overnight with tachycardia and hypotension.  - blood cultures collected. F/u results  - CT abdomen done which showed  Severe colitis, small bilateral pleural effusion with atelectasis vs consolidation  - ID consulted >> to continue PO vanc, IV metronidazole.  - Continue IV fluids.  - Nystatin/triamcinolone for perineal irritation, rectal tube PRN.  - Surgery consult. NPO pending surgery eval.  - Isolation      2) Recent aspiration pneumonia  - Treated at [facility] recently for aspiration PNA, finished course of abx  - Pulmonary status stable  - Discontinue abx for PNA as the patient was recently treated and will worsen patient's active C. Diff infection  on CXR showed bilateral lower infilterate greater on the right    - Watch respiratory status carefully with low threshold for abx if his respiratory status declines  -  Tele, pulse ox  - Continue RT protocol, supplemental 02 PRN  - Got one dose of doxycycline, D/C'ed both unasyn (did not receive) and doxy  -ID consulted, does not think pt  has PNA and should not be treated for It.    3) Alzheimer dementia with agitation  - Dementia worsened with acute delirium 2/2 infection  - Continue home depakote  - Hold outpatient seroquel in the setting of C. Doff as this can cause constipation and worsen patient active C. Diff  - Ativan PO/IV PRN  - Continue soft restriants    4) Hypokalemia  - K 3.2 > 3.5.  - will replete and monitor.    5) Anemia  - Hgb12.3 on admission  - Stable

## 2017-06-16 NOTE — ASSESSMENT & PLAN NOTE
-Home meds: seroquel 25mg bid (Since increased to 50mg BID for agitaiton), depakote 125mg qid  -Confused and altered on admission, was slowly improving but patient is has continued intermittent aggression, agitation, uncooperative. Has required Haldol PRNs for acute agitation.              Plan:              -Continue depakote home dose, seroquel 50mg BID, will also add PRN seroquel, can try using this first before IV haldol              -DC ativan, use Haldol PRN for agitation              -Discussed with DPOAs on importance of re-orientation of patient with familiar faces. Counseled caregiver at bedside to keep blinds open for natural sunlight during daytime.

## 2017-06-16 NOTE — PROGRESS NOTES
Patient was sitting in the bed more alert, oriented to self only, was able to make conversation and answer the questions, he denies any abdominal pain, aware that he has diarrhea, compare with the time of admission patient mental status improving, no agitation, abdominal exam soft, no tenderness or rebound tenderness, Patient heart rate was 92, temp 102.2, RR 20, /64 blood culture and lactic acid was ordered, pending abdominal x ray report, patient making urine using depend, bladder scan was ordered to evaluate of urine retention, troponin was negative and EKG showed no acute changes.    Assessment: Patient currently his mental status better compare with time of admission his tachycardia was related to fever spike, frequent bowel movement, increase movement. no acute changes in abdominal exam, still not tender, no rebound tenderness, no rigidity       Plan  - IV fluid at rate 125cc/hr, no need to increase the rate of IV fluid at this point as the patient made good urine output, moist oral cavity, maintain his blood pressure    - Fever spike was sent for blood culture and lactic acid with repeat CXR, tylenol PRN  - Continue diet order as the patient tolerate the diet with cough or chocking per nurse report, intact gag reflex on exam, no changes on the infiltrates with repeat CXR   - lactic acid was normal, HCO3 was normal  -  no need for surgery at this point, Pending the abdomen x ray report, will order abdomen CT scan for further eval for continue increasing WBC  - hypokalemia was repleted

## 2017-06-16 NOTE — PROGRESS NOTES
Paged ANISAR Evangelista to update the pt's hr has been increasing, and the bp has been dropping.  He remains restrained, is agitated, and confused.

## 2017-06-16 NOTE — THERAPY
"Speech Language Therapy dysphagia treatment completed.   Functional Status: Patient awake, alert, and confused, but cooperative during tx session. Patient noted to have no diet order, despite this SLP ordering NTFL diet yesterday. Diet order noted to be cancelled by MD. Patient with caregiver from [facility] present at bedside. Patient consumed PO trials of NTL via cup sip and straw, soft solids, and thin liquids via tsp and cup sip. Patient presented with throat clear x1 and delayed initiation of swallow trigger on thins via cup sip. Laryngeal elevation palpated as weak. Patient not participating in exercises this tx session. At this time, recommend patient upgrade to Dys2/NTL diet with strict 1:1 feeding and use of swallow precautions. RN and caregiver at bedside aware. SLP is following.     Recommendations: Upgrade to Dys2/NTL diet with use of swallow precautions and strict 1:1 feeding. Please crush meds in applesauce.   Plan of Care: Will benefit from Speech Therapy 3 times per week  Post-Acute Therapy: Discharge to a transitional care facility for continued skilled therapy services.    See \"Rehab Therapy-Acute\" Patient Summary Report for complete documentation.     "

## 2017-06-16 NOTE — DIETARY
Nutrition Services - Weight loss/Poor PO PTA     Admit day 2    70 YO M admitted r/t diarrhea and PNA     PMH: Alzheimer's    Other active problems per MD notes: C.diff, hypokalemia, leukocytosis, anemia    Pertinent Labs: K+ 3.5, glucose 119, alkaline phosophatase 28, corrected calcium 9.16  Pertinent Meds: depakote, flagyl, theragran, KCl, vancomycin   GI: Last BM 6/16   WT: 66.7kg  Body mass index is 22.36 kg/(m^2).  SKIN: no pressure areas or edema documented at this time     Diet: Full liquid, nectar  Thick   PO intake: less than 25% of meals, but % of supplements   Pt receiving TID magic cups and BID VHC boost w/ acceptance   Aspiration precautions in place per RN notes     PLAN/RECOMMEND     Encourage PO intake.     TID magic cup and BID very high calorie (nectar thick) boost per family - continue r/t yesterday's trial went very well and Pt loves his supplements.   Pt receiving 1930 Kcal and 71g protein via supplements     Nutrition Rep to see patient daily for meal preferences. Please document PO intake as percentage of meals consumed.     RD following

## 2017-06-17 ENCOUNTER — APPOINTMENT (OUTPATIENT)
Dept: RADIOLOGY | Facility: MEDICAL CENTER | Age: 72
DRG: 872 | End: 2017-06-17
Attending: SURGERY
Payer: COMMERCIAL

## 2017-06-17 LAB
ANION GAP SERPL CALC-SCNC: 5 MMOL/L (ref 0–11.9)
BACTERIA UR CULT: NORMAL
BASOPHILS # BLD AUTO: 0.4 % (ref 0–1.8)
BASOPHILS # BLD: 0.07 K/UL (ref 0–0.12)
BUN SERPL-MCNC: 10 MG/DL (ref 8–22)
CALCIUM SERPL-MCNC: 7.3 MG/DL (ref 8.5–10.5)
CHLORIDE SERPL-SCNC: 109 MMOL/L (ref 96–112)
CO2 SERPL-SCNC: 25 MMOL/L (ref 20–33)
CREAT SERPL-MCNC: 0.58 MG/DL (ref 0.5–1.4)
EOSINOPHIL # BLD AUTO: 0.19 K/UL (ref 0–0.51)
EOSINOPHIL NFR BLD: 1.1 % (ref 0–6.9)
ERYTHROCYTE [DISTWIDTH] IN BLOOD BY AUTOMATED COUNT: 47.4 FL (ref 35.9–50)
GFR SERPL CREATININE-BSD FRML MDRD: >60 ML/MIN/1.73 M 2
GLUCOSE SERPL-MCNC: 116 MG/DL (ref 65–99)
HCT VFR BLD AUTO: 36.3 % (ref 42–52)
HGB BLD-MCNC: 12.3 G/DL (ref 14–18)
IMM GRANULOCYTES # BLD AUTO: 0.18 K/UL (ref 0–0.11)
IMM GRANULOCYTES NFR BLD AUTO: 1 % (ref 0–0.9)
LACTOFERRIN STL QL IA: POSITIVE
LYMPHOCYTES # BLD AUTO: 1.28 K/UL (ref 1–4.8)
LYMPHOCYTES NFR BLD: 7.1 % (ref 22–41)
MCH RBC QN AUTO: 30.4 PG (ref 27–33)
MCHC RBC AUTO-ENTMCNC: 33.9 G/DL (ref 33.7–35.3)
MCV RBC AUTO: 89.6 FL (ref 81.4–97.8)
MONOCYTES # BLD AUTO: 1.39 K/UL (ref 0–0.85)
MONOCYTES NFR BLD AUTO: 7.7 % (ref 0–13.4)
NEUTROPHILS # BLD AUTO: 14.87 K/UL (ref 1.82–7.42)
NEUTROPHILS NFR BLD: 82.7 % (ref 44–72)
NRBC # BLD AUTO: 0 K/UL
NRBC BLD AUTO-RTO: 0 /100 WBC
PLATELET # BLD AUTO: 170 K/UL (ref 164–446)
PMV BLD AUTO: 10 FL (ref 9–12.9)
POTASSIUM SERPL-SCNC: 3.3 MMOL/L (ref 3.6–5.5)
RBC # BLD AUTO: 4.05 M/UL (ref 4.7–6.1)
SIGNIFICANT IND 70042: NORMAL
SITE SITE: NORMAL
SODIUM SERPL-SCNC: 139 MMOL/L (ref 135–145)
SOURCE SOURCE: NORMAL
WBC # BLD AUTO: 18 K/UL (ref 4.8–10.8)

## 2017-06-17 PROCEDURE — 700111 HCHG RX REV CODE 636 W/ 250 OVERRIDE (IP): Performed by: STUDENT IN AN ORGANIZED HEALTH CARE EDUCATION/TRAINING PROGRAM

## 2017-06-17 PROCEDURE — 700101 HCHG RX REV CODE 250: Performed by: INTERNAL MEDICINE

## 2017-06-17 PROCEDURE — 80048 BASIC METABOLIC PNL TOTAL CA: CPT

## 2017-06-17 PROCEDURE — 85025 COMPLETE CBC W/AUTO DIFF WBC: CPT

## 2017-06-17 PROCEDURE — 700102 HCHG RX REV CODE 250 W/ 637 OVERRIDE(OP): Performed by: STUDENT IN AN ORGANIZED HEALTH CARE EDUCATION/TRAINING PROGRAM

## 2017-06-17 PROCEDURE — A9270 NON-COVERED ITEM OR SERVICE: HCPCS | Performed by: STUDENT IN AN ORGANIZED HEALTH CARE EDUCATION/TRAINING PROGRAM

## 2017-06-17 PROCEDURE — 99233 SBSQ HOSP IP/OBS HIGH 50: CPT | Performed by: INTERNAL MEDICINE

## 2017-06-17 PROCEDURE — A9270 NON-COVERED ITEM OR SERVICE: HCPCS | Performed by: INTERNAL MEDICINE

## 2017-06-17 PROCEDURE — 36415 COLL VENOUS BLD VENIPUNCTURE: CPT

## 2017-06-17 PROCEDURE — 74000 DX-ABDOMEN-1 VIEW: CPT

## 2017-06-17 PROCEDURE — 770020 HCHG ROOM/CARE - TELE (206)

## 2017-06-17 PROCEDURE — 700105 HCHG RX REV CODE 258: Performed by: STUDENT IN AN ORGANIZED HEALTH CARE EDUCATION/TRAINING PROGRAM

## 2017-06-17 PROCEDURE — 700105 HCHG RX REV CODE 258: Performed by: INTERNAL MEDICINE

## 2017-06-17 PROCEDURE — 51798 US URINE CAPACITY MEASURE: CPT

## 2017-06-17 PROCEDURE — 700102 HCHG RX REV CODE 250 W/ 637 OVERRIDE(OP): Performed by: INTERNAL MEDICINE

## 2017-06-17 RX ORDER — DEXTROSE AND SODIUM CHLORIDE 5; .9 G/100ML; G/100ML
INJECTION, SOLUTION INTRAVENOUS CONTINUOUS
Status: DISCONTINUED | OUTPATIENT
Start: 2017-06-17 | End: 2017-06-18

## 2017-06-17 RX ORDER — POTASSIUM CHLORIDE 7.45 MG/ML
10 INJECTION INTRAVENOUS
Status: COMPLETED | OUTPATIENT
Start: 2017-06-17 | End: 2017-06-17

## 2017-06-17 RX ORDER — TAMSULOSIN HYDROCHLORIDE 0.4 MG/1
0.8 CAPSULE ORAL
Status: DISCONTINUED | OUTPATIENT
Start: 2017-06-17 | End: 2017-06-30 | Stop reason: HOSPADM

## 2017-06-17 RX ADMIN — VANCOMYCIN HYDROCHLORIDE 125 MG: 10 INJECTION, POWDER, LYOPHILIZED, FOR SOLUTION INTRAVENOUS at 00:00

## 2017-06-17 RX ADMIN — DEXTROSE AND SODIUM CHLORIDE: 5; 900 INJECTION, SOLUTION INTRAVENOUS at 07:24

## 2017-06-17 RX ADMIN — VANCOMYCIN HYDROCHLORIDE 125 MG: 10 INJECTION, POWDER, LYOPHILIZED, FOR SOLUTION INTRAVENOUS at 23:26

## 2017-06-17 RX ADMIN — METRONIDAZOLE 500 MG: 500 INJECTION, SOLUTION INTRAVENOUS at 05:38

## 2017-06-17 RX ADMIN — DEXTROSE AND SODIUM CHLORIDE: 5; 900 INJECTION, SOLUTION INTRAVENOUS at 23:25

## 2017-06-17 RX ADMIN — POTASSIUM CHLORIDE 10 MEQ: 7.46 INJECTION, SOLUTION INTRAVENOUS at 07:24

## 2017-06-17 RX ADMIN — VANCOMYCIN HYDROCHLORIDE 125 MG: 10 INJECTION, POWDER, LYOPHILIZED, FOR SOLUTION INTRAVENOUS at 17:43

## 2017-06-17 RX ADMIN — NYSTATIN AND TRIAMCINOLONE ACETONIDE: 100000; 1 CREAM TOPICAL at 02:10

## 2017-06-17 RX ADMIN — VANCOMYCIN HYDROCHLORIDE 125 MG: 10 INJECTION, POWDER, LYOPHILIZED, FOR SOLUTION INTRAVENOUS at 12:17

## 2017-06-17 RX ADMIN — DIVALPROEX SODIUM 125 MG: 125 CAPSULE, COATED PELLETS ORAL at 12:17

## 2017-06-17 RX ADMIN — SODIUM CHLORIDE: 9 INJECTION, SOLUTION INTRAVENOUS at 05:37

## 2017-06-17 RX ADMIN — DEXTROSE AND SODIUM CHLORIDE: 5; 900 INJECTION, SOLUTION INTRAVENOUS at 16:50

## 2017-06-17 RX ADMIN — DIVALPROEX SODIUM 125 MG: 125 CAPSULE, COATED PELLETS ORAL at 00:00

## 2017-06-17 RX ADMIN — DIVALPROEX SODIUM 125 MG: 125 CAPSULE, COATED PELLETS ORAL at 17:43

## 2017-06-17 RX ADMIN — DIVALPROEX SODIUM 125 MG: 125 CAPSULE, COATED PELLETS ORAL at 23:26

## 2017-06-17 RX ADMIN — POTASSIUM CHLORIDE 10 MEQ: 7.46 INJECTION, SOLUTION INTRAVENOUS at 05:34

## 2017-06-17 RX ADMIN — TAMSULOSIN HYDROCHLORIDE 0.8 MG: 0.4 CAPSULE ORAL at 21:30

## 2017-06-17 ASSESSMENT — PAIN SCALES - GENERAL
PAINLEVEL_OUTOF10: 0
PAINLEVEL_OUTOF10: 0

## 2017-06-17 NOTE — PROGRESS NOTES
MD contacted, K this morning is 3.3, replacing with 20 meq IV, pt is NPO for testing this AM. Sitter at bedside, pt was calm over night, did continue to need freq reorientation, pleasantly confused, not following directions to leave lines and tubes intact, restraints reordered see flow sheet for freq monitoring. Afebrile, VSS, denies nausea, had 3 episodes of green-tinged mucous liquid stooling, rectal tube not needed at this time, will pass on to day RN- PRN availability if stool output is increased.

## 2017-06-17 NOTE — PROGRESS NOTES
[facility] Internal Medicine Interval Note    Name Neymar Anderson     1945   Age/Sex 71 y.o. male   MRN [id number]   Code Status Full Code     After 5PM or if no immediate response to page, please call for cross-coverage  Attending/Team: Dr. Ibrahim/Evangelista Call [phone] to page   1st Call - Day Intern (R1):   Dr. Newell 2nd Call - Day Sr. Resident (R2/R3):   Dr. Minaya         Chief complaint/ reason for interval visit (Primary Diagnosis)   Diarrhea, Fever    Interval Problem Daily Status Update        Diarrhea - Incontinent loose brown stool X 3 overnight, rectal tube X 2, expect improvement soon  Fever - Spiked fever on 6/15 of 101 F, no fever in past 24 hour  Dementia with agitation - Altered mentation at his baseline, waxing/waning confusion with agitation, pulling out IV's, continue soft restraints with IV lorazepam PRN.  Recent pneumonia - No signs of pneumonia, recently treated with continued infiltrates, watch for now.      ROS  Unable to perform ROS: medical condition       Consultants/Specialty  ID: Dr. Mejia following  General Surgery: Dr. Ross    Disposition  Guarded    Core Measures  EKG reviewed, Labs reviewed, Medications reviewed and Radiology images reviewed  Holland catheter: No Holland  DVT prophylaxis - mechanical: SCDs  Ulcer prophylaxis: No  Antibiotics: Treating active infection/contamination beyond 24 hours perioperative coverage        Physical Exam       Filed Vitals:    17 2115 17 0100 17 0451 17 0828   BP: 131/67 110/56 100/61 100/57   Pulse: 97 86 89 85   Temp: 36.9 °C (98.4 °F) 36.5 °C (97.7 °F) 36.6 °C (97.8 °F) 36.9 °C (98.5 °F)   Resp: 18 18 18 20   Height:       Weight: 66.7 kg (147 lb 0.8 oz)      SpO2: 96% 94% 94% 93%     Body mass index is 22.36 kg/(m^2). Weight: 66.7 kg (147 lb 0.8 oz)  Oxygen Therapy:  Pulse Oximetry: 93 %, O2 (LPM): 1, O2 Delivery: Silicone Nasal Cannula    Physical Exam  Constitutional: Altered,  unable to answer questions.       Head: Normocephalic and atraumatic.    Eyes: Pupils are equal, round, and reactive to light. Right eye exhibits no discharge. Left eye exhibits no discharge.    Neck: No JVD present. No tracheal deviation present.    Cardiovascular: Normal heart sounds.  Exam reveals no gallop and no friction rub. No murmur heard.  Pulmonary/Chest: No respiratory distress. He has no wheezes. He has no rales. He exhibits no tenderness.    Abdominal: Soft. He exhibits no distension. There is no tenderness. There is no rebound and no guarding, hyperactive bowel sounds.  Musculoskeletal: He exhibits no edema or tenderness.  Lymphadenopathy: He has no cervical adenopathy.   Neurological: He is alert. Disoriented x 3.  Skin: Skin is warm.     Lab Data Review:      6/17/2017  12:04 PM    Recent Labs      06/15/17   0148  06/15/17   2348  06/17/17   0308   SODIUM  136  140  139   POTASSIUM  3.2*  3.5*  3.3*   CHLORIDE  107  109  109   CO2  22  25  25   BUN  15  8  10   CREATININE  0.72  0.69  0.58   MAGNESIUM  1.6  1.7   --    PHOSPHORUS   --   2.4*   --    CALCIUM  7.5*  7.8*  7.3*       Recent Labs      06/15/17   0148  06/15/17   2348  06/17/17   0308   ALTSGPT  10  9   --    ASTSGOT  15  14   --    ALKPHOSPHAT  32  28*   --    TBILIRUBIN  0.4  0.5   --    GLUCOSE  115*  119*  116*       Recent Labs      06/15/17   0400  06/15/17   2348  06/17/17   0309   RBC  4.09*  4.15*  4.05*   HEMOGLOBIN  12.3*  12.5*  12.3*   HEMATOCRIT  39.6*  37.9*  36.3*   PLATELETCT  115*  159*  170       Recent Labs      06/15/17   0148  06/15/17   0400  06/15/17   2348  06/17/17   0309   WBC   --   17.2*  23.5*  18.0*   NEUTSPOLYS   --   77.80*  81.70*  82.70*   LYMPHOCYTES   --   8.60*  4.40*  7.10*   MONOCYTES   --   12.60  6.10  7.70   EOSINOPHILS   --   0.20  0.00  1.10   BASOPHILS   --   0.30  0.00  0.40   ASTSGOT  15   --   14   --    ALTSGPT  10   --   9   --    ALKPHOSPHAT  32   --   28*   --    TBILIRUBIN  0.4   --    0.5   --            Assessment/Plan     C. difficile diarrhea  - severe C.diff/NAP-1 strain   - Several day history of diarrhea in setting of recent abx use  - WBC 17 on admit . Trending down today.  - C. Diff PCR, NAP1 positive  - KUB on admission: mildly dilated air-filled loops of small bowel, ileus vs. Intermittent or partial obstruction.   - Repeat KUB: Diffuse air-filled distention of small bowel, appearance suggests ileus or enteritis, evolving obstructive changes not excluded.  - CT Abd: Small bilateral pleural effusions with atelectasis versus consolidation. Changes compatible with severe colitis, correlating with history.  - Rectal tube PRN  - D/C martini with bladder scan q6hr. Restart tamsulosin  - Stable vitals overnight  - Patient continues to pass loose stool, abdomen non-distended, no evidence of acute abdomen or toxic megacolon but condition is guarded.  - Blood Cx: NGTD  - Surgery following. Kept NPO overnight. Resume diet today as swallow eval already performed.  - Infectious disease following. Continue PO vanc and D/C IV metro per rec's.   -Continue PO vanc, IV metro  -Continue IV fluids  -Nystatin/triamcinolone for perineal irritation, rectal tube in place    Late onset Alzheimer's disease with behavioral disturbance  Assessment & Plan  -Home meds: seroquel 25mg bid, ativan 1mg PRN, depakote 125mg qid  -Confused and altered on admission, slowly improving and more responsive now. Put on restraints as pulling tubes/lines out.  -Continuing depakote while inpatient  -Ativan 0.5-1mg PRN for agitation but should keep this to a minimum as may exacerbate altered mentation.    Hypokalemia  Assessment & Plan  -Likely 2/2 diarrhea  -3.2 on admission >> 3.5 today  -Replete 20meq KCl  - Monitor in AM    Leukocytosis  Assessment & Plan  -17.2 on admission. Trending down this AM.  -Fevers overnight  -Likely 2/2 C diff colitis  -Continuing PO vanco. D/C IV flagyl per ID rec's    Anemia  Assessment & Plan  -12.3  on admission, stable overnight.  -May be 2/2 iron deficiency vs B12/folate deficiency vs ACD. May also be 2/2 depakote side effect.  -Can pursue these causes after acute stabilization of C diff colitis. Will continue fluids and multivitamins.

## 2017-06-17 NOTE — PROGRESS NOTES
Date & Time:   6/17/2017   10:51 AM        Patient ID:             Name:             Neymar Anderson   YOB: 1945  Age:                 71 y.o.  male   MRN:               [id number]    ________________________________________________________________________      Events:       Patient resting comfortably  Denies abdominal pain    Rectal tube in place, light brown liquid stool in bag      Interval Exam:       Filed Vitals:    06/16/17 2115 06/17/17 0100 06/17/17 0451 06/17/17 0828   BP: 131/67 110/56 100/61 100/57   Pulse: 97 86 89 85   Temp: 36.9 °C (98.4 °F) 36.5 °C (97.7 °F) 36.6 °C (97.8 °F) 36.9 °C (98.5 °F)   Resp: 18 18 18 20   Height:       Weight: 66.7 kg (147 lb 0.8 oz)      SpO2: 96% 94% 94% 93%     Weight/BMI: Body mass index is 22.36 kg/(m^2).  Pulse Oximetry: 93 %, O2 (LPM): 1, O2 Delivery: Silicone Nasal Cannula    Intake/Output Summary (Last 24 hours) at 06/17/17 1051  Last data filed at 06/17/17 0453   Gross per 24 hour   Intake      0 ml   Output   1500 ml   Net  -1500 ml       Physical Exam  HEENT: PERRLA.  Pupillary light reflex intact.  No thyromegaly.  No supraclavicular or cervical LAD.  CV: S1 and S2 present. No murmurs, rubs or gallops.  Regular rate and rhythm. No carotid bruits or radiation of heart sounds to the axilla.  Capillary refill <3 seconds.  Lungs: Clear to auscultation bilaterally.  Symmetric thoracic expansion.  No changes to tactile fremitus. No dullness to percussion.  Abd: soft non-distended. Non-tender.  Extremities: No LE edema. Dorsalis pedis and posterior tibialis pulses 2+ bilaterally.  Neuro: CNII-XII grossly intact.      Recent Labs      06/15/17   0148  06/15/17   2348  06/17/17   0308   SODIUM  136  140  139   POTASSIUM  3.2*  3.5*  3.3*   CHLORIDE  107  109  109   CO2  22  25  25   BUN  15  8  10   CREATININE  0.72  0.69  0.58   MAGNESIUM  1.6  1.7   --    PHOSPHORUS   --   2.4*   --    CALCIUM  7.5*  7.8*  7.3*       Recent Labs      06/15/17   0148   06/15/17   2348  06/17/17   0308   ALTSGPT  10  9   --    ASTSGOT  15  14   --    ALKPHOSPHAT  32  28*   --    TBILIRUBIN  0.4  0.5   --    GLUCOSE  115*  119*  116*       Recent Labs      06/15/17   0400  06/15/17   2348  06/17/17   0309   RBC  4.09*  4.15*  4.05*   HEMOGLOBIN  12.3*  12.5*  12.3*   HEMATOCRIT  39.6*  37.9*  36.3*   PLATELETCT  115*  159*  170       Recent Labs      06/15/17   0148  06/15/17   0400  06/15/17   2348  06/17/17   0309   WBC   --   17.2*  23.5*  18.0*   NEUTSPOLYS   --   77.80*  81.70*  82.70*   LYMPHOCYTES   --   8.60*  4.40*  7.10*   MONOCYTES   --   12.60  6.10  7.70   EOSINOPHILS   --   0.20  0.00  1.10   BASOPHILS   --   0.30  0.00  0.40   ASTSGOT  15   --   14   --    ALTSGPT  10   --   9   --    ALKPHOSPHAT  32   --   28*   --    TBILIRUBIN  0.4   --   0.5   --          ________________________________________________________________________     Current Assessment and Plan:    c-diff colitis  Afebrile, VSS  WBC slightly down today.  Abdominal exam remains benign  Continue present management  Surgery will continue to follow

## 2017-06-17 NOTE — CONSULTS
DATE OF SERVICE:  06/16/2017    CHIEF COMPLAINT:  Worsening colitis.    HISTORY OF PRESENT ILLNESS:  Patient is a 71-year-old male with past medical   history of Alzheimer's dementia, brought in from [facility] for severe diarrhea   and fevers.  The patient at the time of admission has been taking   ciprofloxacin for suspected pneumonia.  He was diagnosed with C. diff colitis   with positive stool, PCR and toxin A and B.  Surgery was consulted for   worsening leukocytosis and CT abdomen and pelvis.  This showed a severe   colitis.  The patient says he is having mild abdominal pain.  Otherwise, his   history is difficult to obtain.  He has a rectal tube in place.  The bag is   full of light brown, nonbloody stool.  The patient is not currently on   pressors.  Per the nursing, his vitals have remained stable today.    PAST MEDICAL HISTORY:  Alzheimer's dementia.    PAST SURGICAL HISTORY:  No surgeries.    SOCIAL HISTORY:  Lives in a home Skilled Nursing.    FAMILY HISTORY:  Unable to obtain.    MEDICATIONS:  See encounter summary.    ALLERGIES:  No known drug allergies.    REVIEW OF SYSTEMS:  Unable to obtain.    PHYSICAL EXAMINATION:  VITAL SIGNS:  Patient's temperature is 36.7 with a T-max in the last 24 hours   38.3, heart rate low 90s, blood pressure 110s/70s, mean arterial pressure of   67, sats are 98% on 2 L nasal cannula.  GENERAL:  The patient is in no apparent distress, alert, oriented x1.  HEENT:  Normocephalic, atraumatic.  PERRL.  NECK:  Soft.  No cervical lymphadenopathy.  Thyroid within normal limits.  CARDIOVASCULAR:  Regular rate and rhythm.  No murmurs.  EXTREMITIES:  Warm.  No edema.  PULMONARY:  Lungs clear to auscultation bilaterally.  Normal respiratory   effort.  ABDOMEN:  Soft, nondistended, mild tenderness to palpation.  No rebound, no   guarding.  SKIN:  No erythema, no rashes.  NEUROLOGIC:  Cranial nerves II-XII grossly intact.  Normal sensation and   strength in bilateral upper and lower  extremities.    LABORATORY DATA:  The patient's white blood cell count is 23 from 17,   hemoglobin 12, hematocrit 37, platelets 159.  Sodium 140, potassium 3.5,   chloride 109, bicarbonate 25, BUN is 8, creatinine 0.6, AST 14, ALT 9, alk   phos 28, albumin 2.3.    IMAGING:  CT abdomen and pelvis shows diffuse severe colonic wall thickening   with pericolonic fat stranding.  Appendix not visualized.  Mild fluid filled   prominence of small bowel, no free air, no significant free fluid in the   pelvis.    ASSESSMENT AND PLAN:  A 71-year-old male with C. diff colitis.  Clinically,   patient has slightly worse clinical picture today.  He has been receiving IV   Flagyl as well as oral vancomycin.  Patient currently has stable vitals.  He   is not requiring any pressors.  His urine output is good.  On exam, his   abdominal exam is benign and his imaging shows no sign of dilated colon that   would indicate toxic megacolon.  He does have slightly distended small bowel   consistent with ileus, given his benign exam, no sign of shock.  Plan is to   monitor him closely.  Keep the patient n.p.o.  Recommended infectious disease   consult given the patient's previous history of C. diff and worsening symptoms   on current aggressive antibiotics.  We will repeat his abdominal x-ray in the   morning, monitoring for dilated transverse colon.  Also, he will be monitored   closely for signs of shock and worsening leukocytosis that would indicate a   _____ course and would be indication for subtotal colectomy.  Continue current   management.  No plan for surgery at this time _____ follow up closely.       ____________________________________     MD DAVE Haywood / CHAITANYA    DD:  06/16/2017 14:30:48  DT:  06/16/2017 18:49:18    D#:  [id number]  Job#:  [id number]

## 2017-06-17 NOTE — PROGRESS NOTES
Summary for Today:    Pt in and out of confusion with aggressive behavior. Caretaker at bedside and Pt was still able to remove rectal tube X3. Pt recd complete ADL care today, face shaved hair washed, mouth care done. Rectal tube replaced X2. Had a talk with the sitters   And asked them to please keep a closer eye on Pt so he doesn't pull his lines out. Slitters verbalized understanding. Pt also pulled out 2 PIV . RN restarted 1 PIV and around shift change Pt pulled the new IV site out. Pt was cleaned several times with soap and water after Pt had bowel movements. And barrier cream applied with every stool and Q2 hr turns as well.Pt was given meds as ordered. Pt ingrid all nursing interventions well with no distress at this time. Jailyn Camacho RN,. BSN                                                    Monitor summary:  HR: 50-93  Ectopy: none  Rhythm: SR - ST  .18/.08/.36  Jailyn Camacho

## 2017-06-17 NOTE — PROGRESS NOTES
Assumed pt care, received report from SHAW Camacho, pt is confused, 2 point soft restraints to Tito wrists, skin intact, ROM intact, cap refill <2, pt denies pain, no signs of acute discomfort, calm currently, report pt pulled multiple iv's and rectal tube x3, no IV access at this time, will attempt reinsert, pt resting in bed. NPO abd xray sched 6/17 AM. Holland intact, juliette area is clean and dry.

## 2017-06-17 NOTE — PROGRESS NOTES
71 year old demented male seen in f/u for severe c. Diff colitis.   Has  Been diagnosed with severe C.diff/NAP-1+ strain after outpatient course of ciprofloxacin for 10 days.   He is totally demented, unable to give any coherent hx. RN and sitter state he is taking fluids, still with watery diarrhea,  No vomiting . He denies abd pain. Not able to give any coherent answers, however    Meds- oral vancomycin 125mg QID             Iv metronidazole 500mg iv every 8 hours    ROS- unable to obtain due to mental status    PE- afebrile since yesterday, BP- 90s , P70, comfortable appearing, quiet         Lungs -clear anteriorly        abd- active bowel sounds, no tenderness or rebound        Ext- no edema          Mental status- able to state name only, no focal abnormalities    Labs- WBC- down to 18,000             Renal panel - normal creatinine    Assess: improving C. Diff.- fever gone, WBCs starting to go down, abdominal exam good - no tenderness. NAP-1 positive   Strains associated with more virulent infection and greater risk for relapse  REC: as he is able to take oral, suggest continuing oral vancomycin and stopping iv metronidazole

## 2017-06-18 PROBLEM — E83.42 HYPOMAGNESEMIA: Status: ACTIVE | Noted: 2017-06-18

## 2017-06-18 LAB
ANION GAP SERPL CALC-SCNC: 4 MMOL/L (ref 0–11.9)
BASOPHILS # BLD AUTO: 0.6 % (ref 0–1.8)
BASOPHILS # BLD: 0.06 K/UL (ref 0–0.12)
BUN SERPL-MCNC: 8 MG/DL (ref 8–22)
CA-I SERPL-SCNC: 1.1 MMOL/L (ref 1.1–1.3)
CALCIUM SERPL-MCNC: 6.9 MG/DL (ref 8.5–10.5)
CHLORIDE SERPL-SCNC: 107 MMOL/L (ref 96–112)
CO2 SERPL-SCNC: 26 MMOL/L (ref 20–33)
CREAT SERPL-MCNC: 0.6 MG/DL (ref 0.5–1.4)
EOSINOPHIL # BLD AUTO: 0.3 K/UL (ref 0–0.51)
EOSINOPHIL NFR BLD: 2.8 % (ref 0–6.9)
ERYTHROCYTE [DISTWIDTH] IN BLOOD BY AUTOMATED COUNT: 46.1 FL (ref 35.9–50)
GFR SERPL CREATININE-BSD FRML MDRD: >60 ML/MIN/1.73 M 2
GLUCOSE SERPL-MCNC: 121 MG/DL (ref 65–99)
HCT VFR BLD AUTO: 36.2 % (ref 42–52)
HGB BLD-MCNC: 12 G/DL (ref 14–18)
IMM GRANULOCYTES # BLD AUTO: 0.09 K/UL (ref 0–0.11)
IMM GRANULOCYTES NFR BLD AUTO: 0.8 % (ref 0–0.9)
LYMPHOCYTES # BLD AUTO: 1.1 K/UL (ref 1–4.8)
LYMPHOCYTES NFR BLD: 10.3 % (ref 22–41)
MAGNESIUM SERPL-MCNC: 1.6 MG/DL (ref 1.5–2.5)
MCH RBC QN AUTO: 29.8 PG (ref 27–33)
MCHC RBC AUTO-ENTMCNC: 33.1 G/DL (ref 33.7–35.3)
MCV RBC AUTO: 89.8 FL (ref 81.4–97.8)
MONOCYTES # BLD AUTO: 0.91 K/UL (ref 0–0.85)
MONOCYTES NFR BLD AUTO: 8.5 % (ref 0–13.4)
NEUTROPHILS # BLD AUTO: 8.25 K/UL (ref 1.82–7.42)
NEUTROPHILS NFR BLD: 77 % (ref 44–72)
NRBC # BLD AUTO: 0 K/UL
NRBC BLD AUTO-RTO: 0 /100 WBC
PHOSPHATE SERPL-MCNC: 1.9 MG/DL (ref 2.5–4.5)
PLATELET # BLD AUTO: 204 K/UL (ref 164–446)
PMV BLD AUTO: 10.2 FL (ref 9–12.9)
POTASSIUM SERPL-SCNC: 3 MMOL/L (ref 3.6–5.5)
PTH-INTACT SERPL-MCNC: 37.7 PG/ML (ref 14–72)
RBC # BLD AUTO: 4.03 M/UL (ref 4.7–6.1)
SODIUM SERPL-SCNC: 137 MMOL/L (ref 135–145)
WBC # BLD AUTO: 10.7 K/UL (ref 4.8–10.8)

## 2017-06-18 PROCEDURE — 84100 ASSAY OF PHOSPHORUS: CPT

## 2017-06-18 PROCEDURE — A9270 NON-COVERED ITEM OR SERVICE: HCPCS | Performed by: INTERNAL MEDICINE

## 2017-06-18 PROCEDURE — 700102 HCHG RX REV CODE 250 W/ 637 OVERRIDE(OP): Performed by: INTERNAL MEDICINE

## 2017-06-18 PROCEDURE — 82330 ASSAY OF CALCIUM: CPT

## 2017-06-18 PROCEDURE — 770020 HCHG ROOM/CARE - TELE (206)

## 2017-06-18 PROCEDURE — 36415 COLL VENOUS BLD VENIPUNCTURE: CPT

## 2017-06-18 PROCEDURE — 83735 ASSAY OF MAGNESIUM: CPT

## 2017-06-18 PROCEDURE — 700105 HCHG RX REV CODE 258: Performed by: INTERNAL MEDICINE

## 2017-06-18 PROCEDURE — 80048 BASIC METABOLIC PNL TOTAL CA: CPT

## 2017-06-18 PROCEDURE — A9270 NON-COVERED ITEM OR SERVICE: HCPCS | Performed by: STUDENT IN AN ORGANIZED HEALTH CARE EDUCATION/TRAINING PROGRAM

## 2017-06-18 PROCEDURE — 700102 HCHG RX REV CODE 250 W/ 637 OVERRIDE(OP): Performed by: STUDENT IN AN ORGANIZED HEALTH CARE EDUCATION/TRAINING PROGRAM

## 2017-06-18 PROCEDURE — 83970 ASSAY OF PARATHORMONE: CPT

## 2017-06-18 PROCEDURE — 700111 HCHG RX REV CODE 636 W/ 250 OVERRIDE (IP): Performed by: INTERNAL MEDICINE

## 2017-06-18 PROCEDURE — 85025 COMPLETE CBC W/AUTO DIFF WBC: CPT

## 2017-06-18 PROCEDURE — 99232 SBSQ HOSP IP/OBS MODERATE 35: CPT | Performed by: INTERNAL MEDICINE

## 2017-06-18 RX ORDER — POTASSIUM CHLORIDE 20 MEQ/1
40 TABLET, EXTENDED RELEASE ORAL ONCE
Status: DISCONTINUED | OUTPATIENT
Start: 2017-06-18 | End: 2017-06-18

## 2017-06-18 RX ORDER — MAGNESIUM SULFATE HEPTAHYDRATE 40 MG/ML
4 INJECTION, SOLUTION INTRAVENOUS ONCE
Status: COMPLETED | OUTPATIENT
Start: 2017-06-18 | End: 2017-06-18

## 2017-06-18 RX ORDER — SODIUM CHLORIDE 9 MG/ML
INJECTION, SOLUTION INTRAVENOUS CONTINUOUS
Status: DISCONTINUED | OUTPATIENT
Start: 2017-06-18 | End: 2017-06-22

## 2017-06-18 RX ORDER — POTASSIUM CHLORIDE 1.5 G/1.58G
40 POWDER, FOR SOLUTION ORAL ONCE
Status: COMPLETED | OUTPATIENT
Start: 2017-06-18 | End: 2017-06-18

## 2017-06-18 RX ADMIN — DIBASIC SODIUM PHOSPHATE, MONOBASIC POTASSIUM PHOSPHATE AND MONOBASIC SODIUM PHOSPHATE 1 TABLET: 852; 155; 130 TABLET ORAL at 20:11

## 2017-06-18 RX ADMIN — DIVALPROEX SODIUM 125 MG: 125 CAPSULE, COATED PELLETS ORAL at 13:50

## 2017-06-18 RX ADMIN — VANCOMYCIN HYDROCHLORIDE 125 MG: 10 INJECTION, POWDER, LYOPHILIZED, FOR SOLUTION INTRAVENOUS at 06:26

## 2017-06-18 RX ADMIN — DEXTROSE AND SODIUM CHLORIDE: 5; 900 INJECTION, SOLUTION INTRAVENOUS at 10:33

## 2017-06-18 RX ADMIN — VANCOMYCIN HYDROCHLORIDE 125 MG: 10 INJECTION, POWDER, LYOPHILIZED, FOR SOLUTION INTRAVENOUS at 10:40

## 2017-06-18 RX ADMIN — SODIUM CHLORIDE: 9 INJECTION, SOLUTION INTRAVENOUS at 13:54

## 2017-06-18 RX ADMIN — MAGNESIUM SULFATE HEPTAHYDRATE 4 G: 40 INJECTION, SOLUTION INTRAVENOUS at 10:39

## 2017-06-18 RX ADMIN — DIVALPROEX SODIUM 125 MG: 125 CAPSULE, COATED PELLETS ORAL at 20:09

## 2017-06-18 RX ADMIN — DIVALPROEX SODIUM 125 MG: 125 CAPSULE, COATED PELLETS ORAL at 06:26

## 2017-06-18 RX ADMIN — POTASSIUM CHLORIDE 40 MEQ: 1.5 POWDER, FOR SOLUTION ORAL at 13:50

## 2017-06-18 RX ADMIN — LORAZEPAM 2 MG: 2 INJECTION INTRAMUSCULAR; INTRAVENOUS at 20:14

## 2017-06-18 RX ADMIN — VANCOMYCIN HYDROCHLORIDE 125 MG: 10 INJECTION, POWDER, LYOPHILIZED, FOR SOLUTION INTRAVENOUS at 20:10

## 2017-06-18 RX ADMIN — TAMSULOSIN HYDROCHLORIDE 0.8 MG: 0.4 CAPSULE ORAL at 20:10

## 2017-06-18 RX ADMIN — THERA TABS 1 TABLET: TAB at 10:39

## 2017-06-18 RX ADMIN — DIBASIC SODIUM PHOSPHATE, MONOBASIC POTASSIUM PHOSPHATE AND MONOBASIC SODIUM PHOSPHATE 1 TABLET: 852; 155; 130 TABLET ORAL at 10:39

## 2017-06-18 ASSESSMENT — PAIN SCALES - GENERAL
PAINLEVEL_OUTOF10: 0

## 2017-06-18 NOTE — PROGRESS NOTES
Bedside report received from night RN.  Pt assessed A&Ox1, self only, no c/o pain, no sob noted.  POC discussed with pt, all questions answered.  Bilateral wrist restraints in place, CMS checked, no skin breakdown noted  Pt states all needs are met.  Sitter at bedside.  Bed strip alarm on, bed in lowest position, call light within reach.  Will continue to monitor

## 2017-06-18 NOTE — CARE PLAN
Problem: Communication  Goal: The ability to communicate needs accurately and effectively will improve  Outcome: PROGRESSING AS EXPECTED    Problem: Infection  Goal: Will remain free from infection  Outcome: PROGRESSING AS EXPECTED  PO vanc, cbc labs, dx abd series, SX and ID cons    Problem: Bowel/Gastric:  Goal: Normal bowel function is maintained or improved  Outcome: PROGRESSING AS EXPECTED  frequent toileting/bed changes, barrier cream and nystatin cream applied for incont care

## 2017-06-18 NOTE — PROGRESS NOTES
Ezequiel from Lab called with critical result of Ca at 6.9. Critical lab result read back to tech.   Dr. Brito notified of critical lab result at 0645.  Critical lab result read back by Dr. Brito. No orders received.

## 2017-06-18 NOTE — PROGRESS NOTES
Assumed pt care, received report from SHAW Nava, pt is confused, sitter at bedside, noted martini removed today will monitor for void, continued loose stooling, IV intact, Tele intact. Frequent monitoring and toileting/juliette care complete.

## 2017-06-18 NOTE — PROGRESS NOTES
5 loose brown mucus BM's today.  Holland removed, condom cath in place.  Bladder scan ordered for 2300

## 2017-06-18 NOTE — PROGRESS NOTES
Date & Time:   6/18/2017   5:59 AM        Patient ID:             Name:             Neymar Anderson   YOB: 1945  Age:                 71 y.o.  male   MRN:               [id number]    ________________________________________________________________________      Events:       Patient resting comfortably  Denies abdominal pain    Rectal tube in place, light brown liquid stool in bag    Interval Exam:       Filed Vitals:    06/17/17 2000 06/18/17 0000 06/18/17 0400 06/18/17 0511   BP: 95/53 111/58 89/49 112/50   Pulse: 95 84 83    Temp: 37.2 °C (99 °F) 37.3 °C (99.1 °F) 37 °C (98.6 °F)    Resp: 20 15 16    Height:       Weight: 68.3 kg (150 lb 9.2 oz)      SpO2: 94% 93% 91%      Weight/BMI: Body mass index is 22.9 kg/(m^2).  Pulse Oximetry: 91 %, O2 (LPM): 1, O2 Delivery: None (Room Air)    Intake/Output Summary (Last 24 hours) at 06/18/17 0559  Last data filed at 06/18/17 0511   Gross per 24 hour   Intake   2875 ml   Output    800 ml   Net   2075 ml       Physical Exam  HEENT: PERRLA.  Pupillary light reflex intact.  No thyromegaly.  No supraclavicular or cervical LAD.  CV: S1 and S2 present. No murmurs, rubs or gallops.  Regular rate and rhythm. No carotid bruits or radiation of heart sounds to the axilla.  Capillary refill <3 seconds.  Lungs: Clear to auscultation bilaterally.  Symmetric thoracic expansion.  No changes to tactile fremitus. No dullness to percussion.  Abd: Non-tender, non-distended.No rebound or guarding  Extremities: No LE edema. Dorsalis pedis and posterior tibialis pulses 2+ bilaterally.  Neuro: CNII-XII grossly intact.  No new focal deficits.   Psych: No audio or visual hallucinations.  No changes in thought content or mentation. Thought process logical.  Speech content and syntax appropriate.      Recent Labs      06/15/17   2348  06/17/17   0308   SODIUM  140  139   POTASSIUM  3.5*  3.3*   CHLORIDE  109  109   CO2  25  25   BUN  8  10   CREATININE  0.69  0.58   MAGNESIUM  1.7    --    PHOSPHORUS  2.4*   --    CALCIUM  7.8*  7.3*       Recent Labs      06/15/17   2348  06/17/17   0308   ALTSGPT  9   --    ASTSGOT  14   --    ALKPHOSPHAT  28*   --    TBILIRUBIN  0.5   --    GLUCOSE  119*  116*       Recent Labs      06/15/17   2348  06/17/17   0309  06/18/17   0417   RBC  4.15*  4.05*  4.03*   HEMOGLOBIN  12.5*  12.3*  12.0*   HEMATOCRIT  37.9*  36.3*  36.2*   PLATELETCT  159*  170  204       Recent Labs      06/15/17   2348  06/17/17   0309  06/18/17   0417   WBC  23.5*  18.0*  10.7   NEUTSPOLYS  81.70*  82.70*  77.00*   LYMPHOCYTES  4.40*  7.10*  10.30*   MONOCYTES  6.10  7.70  8.50   EOSINOPHILS  0.00  1.10  2.80   BASOPHILS  0.00  0.40  0.60   ASTSGOT  14   --    --    ALTSGPT  9   --    --    ALKPHOSPHAT  28*   --    --    TBILIRUBIN  0.5   --    --          ________________________________________________________________________     Current Assessment and Plan:   c-diff colitis  Afebrile, VSS  WBC normal  Abdominal exam benign  Much improved  Continue current management

## 2017-06-18 NOTE — PROGRESS NOTES
[facility] [place] [facility]    Name Neymar Anderson     1945   Age/Sex 71 y.o. male   MRN [id number]   Code Status Full     After 5PM or if no immediate response to page, please call for cross-coverage  Attending/Team: Dr. Ibrahim/Evangelista Call [phone] to page   1st Call - Day Intern (R1):   Dr. Newell 2nd Call - Day Sr. Resident (R2/R3):   Dr. Minaya         Chief complaint/ reason for interval visit (Primary Diagnosis)   Diarrhea, fever/C diff colitis    Interval Problem Daily Status Update      Diarrhea - Rectal tube not in place due to patient repeatedly pulling this. Incontinent, watery stools x 3 overnight. Needing electrolytes repleted, fluids running.     Fever - Afebrile for past 48h.     Dementia with agitation - Altered mentation at his baseline, waxing/waning confusion with agitation, pulling out IV's and martini/rectal tube, continue soft restraints with IV lorazepam PRN.    Recent pneumonia - No signs of pneumonia, recently treated with continued infiltrates, ID agrees that there is no active pneumonia now.    Review of Systems   Unable to perform ROS: dementia       Consultants/Specialty  Dr. Mejia/ID  Dr. Ross/General Surgery    Disposition  Guarded. Continued inpatient for treatment of C diff colitis, electrolyte repletion.     Quality Measures  Labs reviewed and Medications reviewed  Martini catheter: No Martini        DVT prophylaxis - mechanical: SCDs  Ulcer prophylaxis: No  Antibiotics: Treating active infection/contamination beyond 24 hours perioperative coverage            Physical Exam       Filed Vitals:    17 0000 17 0400 17 0511 17 0700   BP: 111/58 89/49 112/50 91/52   Pulse: 84 83  83   Temp: 37.3 °C (99.1 °F) 37 °C (98.6 °F)  36.3 °C (97.3 °F)   Resp: 15 16  18   Height:       Weight:       SpO2: 93% 91%  98%     Body mass index is 22.9 kg/(m^2). Weight: 68.3 kg (150 lb 9.2 oz)  Oxygen Therapy:  Pulse Oximetry: 98 %,  O2 (LPM): 1, O2 Delivery: None (Room Air)    Physical Exam   Constitutional:   Resting, eyes closed.   HENT:   Head: Normocephalic and atraumatic.   Cardiovascular: Normal rate, regular rhythm and intact distal pulses.    No murmur heard.  Pulmonary/Chest: Effort normal and breath sounds normal. No respiratory distress. He has no wheezes. He has no rales.   Abdominal: Soft. Bowel sounds are normal. He exhibits no distension and no mass. There is no tenderness. There is no rebound and no guarding.   Musculoskeletal: He exhibits no edema.   Neurological:   Disoriented, unable to answer questions   Skin: Skin is warm and dry. No rash noted. No erythema.         Lab Data Review:      6/18/2017  8:10 AM    Recent Labs      06/15/17   2348  06/17/17   0308 06/18/17 0417   SODIUM  140  139  137   POTASSIUM  3.5*  3.3*  3.0*   CHLORIDE  109  109  107   CO2  25  25  26   BUN  8  10  8   CREATININE  0.69  0.58  0.60   MAGNESIUM  1.7   --   1.6   PHOSPHORUS  2.4*   --   1.9*   CALCIUM  7.8*  7.3*  6.9*       Recent Labs      06/15/17   2348  06/17/17   0308  06/18/17   0417   ALTSGPT  9   --    --    ASTSGOT  14   --    --    ALKPHOSPHAT  28*   --    --    TBILIRUBIN  0.5   --    --    GLUCOSE  119*  116*  121*       Recent Labs      06/15/17   2348  06/17/17   0309 06/18/17   0417   RBC  4.15*  4.05*  4.03*   HEMOGLOBIN  12.5*  12.3*  12.0*   HEMATOCRIT  37.9*  36.3*  36.2*   PLATELETCT  159*  170  204       Recent Labs      06/15/17   2348  06/17/17   0309 06/18/17   0417   WBC  23.5*  18.0*  10.7   NEUTSPOLYS  81.70*  82.70*  77.00*   LYMPHOCYTES  4.40*  7.10*  10.30*   MONOCYTES  6.10  7.70  8.50   EOSINOPHILS  0.00  1.10  2.80   BASOPHILS  0.00  0.40  0.60   ASTSGOT  14   --    --    ALTSGPT  9   --    --    ALKPHOSPHAT  28*   --    --    TBILIRUBIN  0.5   --    --            Assessment/Plan     C. difficile diarrhea  Assessment & Plan  severe C.diff/NAP-1 strain   - Several day history of diarrhea in setting of  recent abx use  - WBC 17 on admit >> 10.3 today  - C. Diff PCR, NAP1 positive  - Lactic acid 1.1, repeat is 1.2  - Blood Cx: NGTD  - KUB on admission: mildly dilated air-filled loops of small bowel, ileus vs. Intermittent or partial obstruction.   - Repeat KUB: Diffuse air-filled distention of small bowel, appearance suggests ileus or enteritis, evolving obstructive changes not excluded.  - CT Abd: Small bilateral pleural effusions with atelectasis versus consolidation. Changes compatible with severe colitis, correlating with history.  - Patient continues to pass loose stool, abdomen non-distended, no evidence of acute abdomen or toxic megacolon but condition is guarded.  - Surgery is following  - ID recs: Dc'd IV metronidazole, continue PO vanco for total of 14 days, will sign off    Plan:   -Continue PO Vancomycin   -Continue IV fluids   -Nystatin/triamcinolone for perineal irritation, rectal tube in place       Hypokalemia  Assessment & Plan  -Likely 2/2 diarrhea  -Has received total 120 meq KCl since admission  -3.0 today, acute drop may also be 2/2 D5 fluids   Plan:   -replete 40meq KCl packet   -switch D5NS to NS       Hypomagnesemia  Assessment & Plan  -1.6 today  -giving 4mg IV Mag  -also mild hypocalcemia, corrected level 8.3, ionized Ca and PTH normal, should rise with repletion of Mag    Late onset Alzheimer's disease with behavioral disturbance  Assessment & Plan  -Home meds: seroquel 25mg bid, ativan 1mg PRN, depakote 125mg qid  -Confused and altered on admission, slowly improving and more responsive now. Put on restraints as pulling tubes/lines out.  -Continuing depakote while inpatient  -Ativan 0.5-1mg PRN for agitation but should keep this to a minimum as may exacerbate altered mentation.    Leukocytosis  Assessment & Plan  -Likely 2/2 C diff colitis  -17.2 on admission, trended down to 10.7 today  -Afebrile for 48h  -No s/s active pneumonia, not treating for this  -Continuing PO  [facility]    Anemia  Assessment & Plan  -12.3 on admission, stable overnight.  -May be 2/2 iron deficiency vs B12/folate deficiency vs ACD. May also be 2/2 depakote side effect.  -Can pursue these causes after acute stabilization of C diff colitis. Will continue fluids and multivitamins.

## 2017-06-18 NOTE — PROGRESS NOTES
MS:    0.14/.08/.40  Pt resting in bed, restraints intact, cms intact, cap relill, sitter at bedside

## 2017-06-18 NOTE — PROGRESS NOTES
Guided imagery on tv for distraction. Pts family is at bedside, and his agitation has somewhat subsided.

## 2017-06-18 NOTE — PROGRESS NOTES
Restraints were D/C at breakfast. At 1200 pt was becoming agitated and non-compliant and was repeatedly removing his tele box. Pt. became increasing agitated, unsafe attempts at mobilization, and aggressive. MD updated, and received new order for restraints.

## 2017-06-18 NOTE — PROGRESS NOTES
71 year old demented male seen in f/u for severe c. Diff colitis.   Has  Been diagnosed with severe C.diff/NAP-1+ strain after outpatient course of ciprofloxacin for 10 days.   He is totally demented, unable to give any coherent hx.      Meds- oral vancomycin 125mg QID               ROS- unable to obtain due to mental status    PE- afebrile  BP- 90s , P70, comfortable appearing, quiet         Lungs -clear anteriorly        abd- active bowel sounds, no tenderness or rebound        Ext- no edema          Mental status- able to state name only, no focal abnormalities    Labs- WBC- down to 10,000              Assess: improving C. Diff.- fever gone, WBCs nearly normal,  abdominal exam good - no tenderness. NAP-1 positive   Strains associated with more virulent infection and greater risk for relapse  REC: complete 14 day course of oral vancomycin            Very high risk for relapse, particularly if given any more antibiotics in future             Will sign off

## 2017-06-19 ENCOUNTER — APPOINTMENT (OUTPATIENT)
Dept: RADIOLOGY | Facility: MEDICAL CENTER | Age: 72
DRG: 872 | End: 2017-06-19
Attending: INTERNAL MEDICINE
Payer: COMMERCIAL

## 2017-06-19 PROBLEM — D72.829 LEUKOCYTOSIS: Status: RESOLVED | Noted: 2017-06-15 | Resolved: 2017-06-19

## 2017-06-19 LAB
ANION GAP SERPL CALC-SCNC: 6 MMOL/L (ref 0–11.9)
BASE EXCESS BLDA CALC-SCNC: 0 MMOL/L (ref -4–3)
BASOPHILS # BLD AUTO: 0.7 % (ref 0–1.8)
BASOPHILS # BLD AUTO: 0.8 % (ref 0–1.8)
BASOPHILS # BLD: 0.08 K/UL (ref 0–0.12)
BASOPHILS # BLD: 0.08 K/UL (ref 0–0.12)
BODY TEMPERATURE: ABNORMAL CENTIGRADE
BUN SERPL-MCNC: 6 MG/DL (ref 8–22)
CALCIUM SERPL-MCNC: 7.1 MG/DL (ref 8.5–10.5)
CHLORIDE SERPL-SCNC: 109 MMOL/L (ref 96–112)
CO2 SERPL-SCNC: 23 MMOL/L (ref 20–33)
CREAT SERPL-MCNC: 0.57 MG/DL (ref 0.5–1.4)
EOSINOPHIL # BLD AUTO: 0.22 K/UL (ref 0–0.51)
EOSINOPHIL # BLD AUTO: 0.32 K/UL (ref 0–0.51)
EOSINOPHIL NFR BLD: 1.8 % (ref 0–6.9)
EOSINOPHIL NFR BLD: 3.1 % (ref 0–6.9)
ERYTHROCYTE [DISTWIDTH] IN BLOOD BY AUTOMATED COUNT: 46.6 FL (ref 35.9–50)
ERYTHROCYTE [DISTWIDTH] IN BLOOD BY AUTOMATED COUNT: 46.7 FL (ref 35.9–50)
GFR SERPL CREATININE-BSD FRML MDRD: >60 ML/MIN/1.73 M 2
GLUCOSE SERPL-MCNC: 94 MG/DL (ref 65–99)
HCO3 BLDA-SCNC: 22 MMOL/L (ref 17–25)
HCT VFR BLD AUTO: 37.3 % (ref 42–52)
HCT VFR BLD AUTO: 38.6 % (ref 42–52)
HGB BLD-MCNC: 12.5 G/DL (ref 14–18)
HGB BLD-MCNC: 12.9 G/DL (ref 14–18)
IMM GRANULOCYTES # BLD AUTO: 0.08 K/UL (ref 0–0.11)
IMM GRANULOCYTES # BLD AUTO: 0.09 K/UL (ref 0–0.11)
IMM GRANULOCYTES NFR BLD AUTO: 0.7 % (ref 0–0.9)
IMM GRANULOCYTES NFR BLD AUTO: 0.9 % (ref 0–0.9)
LACTATE BLD-SCNC: 1.5 MMOL/L (ref 0.5–2)
LYMPHOCYTES # BLD AUTO: 1.2 K/UL (ref 1–4.8)
LYMPHOCYTES # BLD AUTO: 1.3 K/UL (ref 1–4.8)
LYMPHOCYTES NFR BLD: 10.7 % (ref 22–41)
LYMPHOCYTES NFR BLD: 11.5 % (ref 22–41)
MAGNESIUM SERPL-MCNC: 2 MG/DL (ref 1.5–2.5)
MCH RBC QN AUTO: 29.9 PG (ref 27–33)
MCH RBC QN AUTO: 30 PG (ref 27–33)
MCHC RBC AUTO-ENTMCNC: 33.4 G/DL (ref 33.7–35.3)
MCHC RBC AUTO-ENTMCNC: 33.5 G/DL (ref 33.7–35.3)
MCV RBC AUTO: 89.6 FL (ref 81.4–97.8)
MCV RBC AUTO: 89.7 FL (ref 81.4–97.8)
MONOCYTES # BLD AUTO: 1.04 K/UL (ref 0–0.85)
MONOCYTES # BLD AUTO: 1.05 K/UL (ref 0–0.85)
MONOCYTES NFR BLD AUTO: 10.1 % (ref 0–13.4)
MONOCYTES NFR BLD AUTO: 8.5 % (ref 0–13.4)
NEUTROPHILS # BLD AUTO: 7.66 K/UL (ref 1.82–7.42)
NEUTROPHILS # BLD AUTO: 9.45 K/UL (ref 1.82–7.42)
NEUTROPHILS NFR BLD: 73.6 % (ref 44–72)
NEUTROPHILS NFR BLD: 77.6 % (ref 44–72)
NRBC # BLD AUTO: 0 K/UL
NRBC # BLD AUTO: 0 K/UL
NRBC BLD AUTO-RTO: 0 /100 WBC
NRBC BLD AUTO-RTO: 0 /100 WBC
PCO2 BLDA: 28.8 MMHG (ref 26–37)
PH BLDA: 7.5 [PH] (ref 7.4–7.5)
PHOSPHATE SERPL-MCNC: 2.2 MG/DL (ref 2.5–4.5)
PLATELET # BLD AUTO: 200 K/UL (ref 164–446)
PLATELET # BLD AUTO: 211 K/UL (ref 164–446)
PMV BLD AUTO: 10 FL (ref 9–12.9)
PMV BLD AUTO: 9.8 FL (ref 9–12.9)
PO2 BLDA: 62.2 MMHG (ref 64–87)
POTASSIUM SERPL-SCNC: 3.4 MMOL/L (ref 3.6–5.5)
RBC # BLD AUTO: 4.16 M/UL (ref 4.7–6.1)
RBC # BLD AUTO: 4.31 M/UL (ref 4.7–6.1)
SAO2 % BLDA: 91.6 % (ref 93–99)
SODIUM SERPL-SCNC: 138 MMOL/L (ref 135–145)
WBC # BLD AUTO: 10.4 K/UL (ref 4.8–10.8)
WBC # BLD AUTO: 12.2 K/UL (ref 4.8–10.8)

## 2017-06-19 PROCEDURE — 304222 HCHG STAT ISOLATION DAILY CHARGE

## 2017-06-19 PROCEDURE — 80048 BASIC METABOLIC PNL TOTAL CA: CPT

## 2017-06-19 PROCEDURE — 85025 COMPLETE CBC W/AUTO DIFF WBC: CPT

## 2017-06-19 PROCEDURE — 36415 COLL VENOUS BLD VENIPUNCTURE: CPT

## 2017-06-19 PROCEDURE — 700101 HCHG RX REV CODE 250: Performed by: INTERNAL MEDICINE

## 2017-06-19 PROCEDURE — 71010 DX-CHEST-PORTABLE (1 VIEW): CPT

## 2017-06-19 PROCEDURE — 93010 ELECTROCARDIOGRAM REPORT: CPT | Performed by: INTERNAL MEDICINE

## 2017-06-19 PROCEDURE — 700111 HCHG RX REV CODE 636 W/ 250 OVERRIDE (IP): Performed by: INTERNAL MEDICINE

## 2017-06-19 PROCEDURE — A9270 NON-COVERED ITEM OR SERVICE: HCPCS | Performed by: STUDENT IN AN ORGANIZED HEALTH CARE EDUCATION/TRAINING PROGRAM

## 2017-06-19 PROCEDURE — 84100 ASSAY OF PHOSPHORUS: CPT | Mod: 91

## 2017-06-19 PROCEDURE — 87040 BLOOD CULTURE FOR BACTERIA: CPT | Mod: 91

## 2017-06-19 PROCEDURE — 84484 ASSAY OF TROPONIN QUANT: CPT

## 2017-06-19 PROCEDURE — 700105 HCHG RX REV CODE 258: Performed by: INTERNAL MEDICINE

## 2017-06-19 PROCEDURE — 84145 PROCALCITONIN (PCT): CPT

## 2017-06-19 PROCEDURE — 80053 COMPREHEN METABOLIC PANEL: CPT

## 2017-06-19 PROCEDURE — 93005 ELECTROCARDIOGRAM TRACING: CPT | Performed by: INTERNAL MEDICINE

## 2017-06-19 PROCEDURE — 99233 SBSQ HOSP IP/OBS HIGH 50: CPT | Performed by: INTERNAL MEDICINE

## 2017-06-19 PROCEDURE — A9270 NON-COVERED ITEM OR SERVICE: HCPCS | Performed by: INTERNAL MEDICINE

## 2017-06-19 PROCEDURE — 83735 ASSAY OF MAGNESIUM: CPT | Mod: 91

## 2017-06-19 PROCEDURE — 700102 HCHG RX REV CODE 250 W/ 637 OVERRIDE(OP): Performed by: STUDENT IN AN ORGANIZED HEALTH CARE EDUCATION/TRAINING PROGRAM

## 2017-06-19 PROCEDURE — 82803 BLOOD GASES ANY COMBINATION: CPT

## 2017-06-19 PROCEDURE — 83605 ASSAY OF LACTIC ACID: CPT

## 2017-06-19 PROCEDURE — 700102 HCHG RX REV CODE 250 W/ 637 OVERRIDE(OP): Performed by: INTERNAL MEDICINE

## 2017-06-19 PROCEDURE — 770020 HCHG ROOM/CARE - TELE (206)

## 2017-06-19 RX ORDER — POTASSIUM CHLORIDE 1.5 G/1.58G
40 POWDER, FOR SOLUTION ORAL ONCE
Status: DISCONTINUED | OUTPATIENT
Start: 2017-06-19 | End: 2017-06-20

## 2017-06-19 RX ORDER — QUETIAPINE FUMARATE 25 MG/1
25 TABLET, FILM COATED ORAL 2 TIMES DAILY
Status: DISCONTINUED | OUTPATIENT
Start: 2017-06-19 | End: 2017-06-21

## 2017-06-19 RX ORDER — HALOPERIDOL 5 MG/ML
2-5 INJECTION INTRAMUSCULAR EVERY 4 HOURS PRN
Status: DISCONTINUED | OUTPATIENT
Start: 2017-06-19 | End: 2017-06-30 | Stop reason: HOSPADM

## 2017-06-19 RX ORDER — QUETIAPINE FUMARATE 25 MG/1
50 TABLET, FILM COATED ORAL EVERY 8 HOURS PRN
Status: DISCONTINUED | OUTPATIENT
Start: 2017-06-19 | End: 2017-06-21

## 2017-06-19 RX ADMIN — ACETAMINOPHEN 650 MG: 325 TABLET, FILM COATED ORAL at 19:49

## 2017-06-19 RX ADMIN — DIVALPROEX SODIUM 125 MG: 125 CAPSULE, COATED PELLETS ORAL at 23:25

## 2017-06-19 RX ADMIN — SODIUM CHLORIDE: 9 INJECTION, SOLUTION INTRAVENOUS at 17:03

## 2017-06-19 RX ADMIN — METRONIDAZOLE 500 MG: 500 INJECTION, SOLUTION INTRAVENOUS at 23:25

## 2017-06-19 RX ADMIN — SODIUM CHLORIDE: 9 INJECTION, SOLUTION INTRAVENOUS at 06:30

## 2017-06-19 RX ADMIN — DIVALPROEX SODIUM 125 MG: 125 CAPSULE, COATED PELLETS ORAL at 18:39

## 2017-06-19 RX ADMIN — DIBASIC SODIUM PHOSPHATE, MONOBASIC POTASSIUM PHOSPHATE AND MONOBASIC SODIUM PHOSPHATE 1 TABLET: 852; 155; 130 TABLET ORAL at 19:49

## 2017-06-19 RX ADMIN — VANCOMYCIN HYDROCHLORIDE 125 MG: 10 INJECTION, POWDER, LYOPHILIZED, FOR SOLUTION INTRAVENOUS at 23:25

## 2017-06-19 RX ADMIN — TAMSULOSIN HYDROCHLORIDE 0.8 MG: 0.4 CAPSULE ORAL at 19:49

## 2017-06-19 RX ADMIN — DIVALPROEX SODIUM 125 MG: 125 CAPSULE, COATED PELLETS ORAL at 14:38

## 2017-06-19 RX ADMIN — QUETIAPINE FUMARATE 25 MG: 25 TABLET ORAL at 14:38

## 2017-06-19 RX ADMIN — LORAZEPAM 2 MG: 2 INJECTION INTRAMUSCULAR; INTRAVENOUS at 02:20

## 2017-06-19 RX ADMIN — VANCOMYCIN HYDROCHLORIDE 125 MG: 10 INJECTION, POWDER, LYOPHILIZED, FOR SOLUTION INTRAVENOUS at 13:18

## 2017-06-19 RX ADMIN — QUETIAPINE FUMARATE 25 MG: 25 TABLET ORAL at 19:49

## 2017-06-19 RX ADMIN — VANCOMYCIN HYDROCHLORIDE 125 MG: 10 INJECTION, POWDER, LYOPHILIZED, FOR SOLUTION INTRAVENOUS at 18:39

## 2017-06-19 ASSESSMENT — PAIN SCALES - GENERAL: PAINLEVEL_OUTOF10: 0

## 2017-06-19 NOTE — CARE PLAN
Problem: Safety  Goal: Will remain free from falls  Intervention: Implement fall precautions  Bed alarm on, bed locked in low position, treaded socks on, call light and items within reach. Hourly rounding in place, near nurses station. Assessed mobility and communicated with CNA and patient regarding risk of fall. Wrist restraints in use, sitter at bedside.           Problem: Skin Integrity  Goal: Risk for impaired skin integrity will decrease  Waffle cushion mattress topper, up to chair as much as possible. Pt turns self from side to side frequently, bony prominences padded,  encouraged PO intake. Assessed skin thoroughly and ensured moisture was minimized, nystatin cream applied to redness in groin

## 2017-06-19 NOTE — PROGRESS NOTES
Date & Time:   6/19/2017   10:46 AM        Patient ID:             Name:             Neymar Anderson   YOB: 1945  Age:                 71 y.o.  male   MRN:               [id number]    ________________________________________________________________________      Events:       Patient resting comfortably  Denies abdominal pain  Still having watery stools, non-bloody        Interval Exam:       Filed Vitals:    06/18/17 1600 06/19/17 0000 06/19/17 0400 06/19/17 0900   BP: 100/56 98/59 104/54 100/49   Pulse: 91 87 82 85   Temp: 37.3 °C (99.2 °F) 37 °C (98.6 °F) 36.8 °C (98.2 °F) 37.2 °C (98.9 °F)   Resp: 18 16 19 18   Height:       Weight:  77 kg (169 lb 12.1 oz)     SpO2: 92% 93% 92% 95%     Weight/BMI: Body mass index is 25.82 kg/(m^2).  Pulse Oximetry: 95 %, O2 (LPM): 0, O2 Delivery: None (Room Air)    Intake/Output Summary (Last 24 hours) at 06/19/17 1046  Last data filed at 06/18/17 1800   Gross per 24 hour   Intake   1960 ml   Output      0 ml   Net   1960 ml       Physical Exam  HEENT: PERRLA.  Pupillary light reflex intact.  No thyromegaly.  No supraclavicular or cervical LAD.  CV: S1 and S2 present. No murmurs, rubs or gallops.  Regular rate and rhythm. No carotid bruits or radiation of heart sounds to the axilla.  Capillary refill <3 seconds.  Lungs: Clear to auscultation bilaterally.  Symmetric thoracic expansion.  No changes to tactile fremitus. No dullness to percussion.  Abd: Non-tender, non-distended. Normal bowel sounds in all four quadrants.  Liver span within normal limits. No organomegaly.  Extremities: No LE edema. Dorsalis pedis and posterior tibialis pulses 2+ bilaterally.  Neuro: CNII-XII grossly intact.  No new focal deficits.   Psych: No audio or visual hallucinations.  No changes in thought content or mentation. Thought process logical.  Speech content and syntax appropriate.      Recent Labs      06/17/17   0308  06/18/17   0417  06/19/17   0438   SODIUM  139  137  138    POTASSIUM  3.3*  3.0*  3.4*   CHLORIDE  109  107  109   CO2  25  26  23   BUN  10  8  6*   CREATININE  0.58  0.60  0.57   MAGNESIUM   --   1.6  2.0   PHOSPHORUS   --   1.9*  2.2*   CALCIUM  7.3*  6.9*  7.1*       Recent Labs      06/17/17 0308 06/18/17 0417 06/19/17 0438   GLUCOSE  116*  121*  94       Recent Labs      06/17/17 0309 06/18/17 0417 06/19/17 0438   RBC  4.05*  4.03*  4.31*   HEMOGLOBIN  12.3*  12.0*  12.9*   HEMATOCRIT  36.3*  36.2*  38.6*   PLATELETCT  170  204  200       Recent Labs      06/17/17 0309 06/18/17 0417 06/19/17 0438   WBC  18.0*  10.7  10.4   NEUTSPOLYS  82.70*  77.00*  73.60*   LYMPHOCYTES  7.10*  10.30*  11.50*   MONOCYTES  7.70  8.50  10.10   EOSINOPHILS  1.10  2.80  3.10   BASOPHILS  0.40  0.60  0.80         ________________________________________________________________________     Current Assessment and Plan:   Cont. Medical management for c-diff colitis, patient much improved  Surgery will sign off please call back if needed.

## 2017-06-19 NOTE — PROGRESS NOTES
Assumed pt care, report received SHAW Byrne, confused, irritable, restraints soft BUE, uncooperative, attempting to remove lines and Tele, sitter at bedside from caregiver service-edu inform RN pt is attempting to mobilize unsafely, if pt is able to remove tele leads. Pt was convinced after multiple attempts to consume PO vanc and evening meds that were refused from previous shift. Pt refused all meds sched for midnight. MD notified pt very restless and difficult to redirect from struggling against restraints, 2 mg ativan given, beginning of shift, early AM- see MAR. Pt had 4+ BM loose, inc B&B. IV intact infusing fluids. Barrier and Nystatin applied to areas of moisture dermatitis

## 2017-06-19 NOTE — CARE PLAN
Problem: Safety  Goal: Will remain free from injury  Restraints maintained, sitter at bedside, ativan for irritability and restlessness, tele replaced when removed by patient

## 2017-06-19 NOTE — CARE PLAN
Problem: Infection  Goal: Will remain free from infection  Outcome: PROGRESSING AS EXPECTED  Po vanc, frequent toileting and juliette care

## 2017-06-19 NOTE — PROGRESS NOTES
"Patients POA was at bedside, tried extensively to get patient to eat, and take his oral antibiotics. Patient continues to refuse and yell obscenities at RN and Jh POA. Patient educated that if he continues to refuse his medications and food that we will have to put a tube in his nose to his belly and he told RN to \"fuck off\". Jh MORROW said go ahead do the cortrak if that is how he will be.   "

## 2017-06-19 NOTE — PROGRESS NOTES
[facility] [place] [facility] Interval Note    Name Neymar Anderson     1945   Age/Sex 71 y.o. male   MRN [id number]   Code Status Full     After 5PM or if no immediate response to page, please call for cross-coverage  Attending/Team: Dr. Ibrahim/Evangelista Call [phone] to page   1st Call - Day Intern (R1):   Dr. Newell 2nd Call - Day Sr. Resident (R2/R3):   Dr. Minaya         Chief complaint/ reason for interval visit (Primary Diagnosis)   Diarrhea, fever/C diff colitis    Interval Problem Daily Status Update    Active Problems:    C. difficile diarrhea: Continuing PO vanco but patient has been refusing his last few doses. Discussed case with Dr. Mejia from ID who stated that IV metro is inadequate treatment and needs PO vanc. Considering putting a cortrak in if patient continues to refuse PO vanc (Cortrak ordered, but should be used as a last resort), DPLAWSON updated on case and agrees to this, DPOA visited Damon and Jh agree to see patient today and convince him to take his PO vanc as he knows and trusts them. Otherwise, clinically stable still having loose stools but starting to form.      Hypokalemia: Resolving, will continue to supplement.      Hypomagnesemia: Resolving, will continue to supplement.       Late onset Alzheimer's disease with behavioral disturbance: Acute agitation was worsening overnight, received total 4mg ativan overnight, sedative effects taking longer to wear off this morning but clinically vital signs and ABG stable, discontinue ativan and restart outpatient seroquel.      Anemia: Hgb stable, VSS.    Resolved Problems:    Pneumonia POA: Unknown    Leukocytosis POA: Unknown      Review of Systems   Unable to perform ROS: dementia       Consultants/Specialty  Dr. Mejia/ID  Dr. Ross/General Surgery    Disposition  Guarded. Continued inpatient for treatment of C diff colitis, electrolyte repletion.    Quality Measures  Labs reviewed and Medications  reviewed  Holland catheter: No Holland        DVT prophylaxis - mechanical: SCDs  Ulcer prophylaxis: No  Antibiotics: Treating active infection/contamination beyond 24 hours perioperative coverage            Physical Exam       Filed Vitals:    06/19/17 0000 06/19/17 0400 06/19/17 0900 06/19/17 1133   BP: 98/59 104/54 100/49 105/57   Pulse: 87 82 85 85   Temp: 37 °C (98.6 °F) 36.8 °C (98.2 °F) 37.2 °C (98.9 °F) 36.8 °C (98.2 °F)   Resp: 16 19 18 18   Height:       Weight: 77 kg (169 lb 12.1 oz)      SpO2: 93% 92% 95% 95%     Body mass index is 25.82 kg/(m^2). Weight: 77 kg (169 lb 12.1 oz)  Oxygen Therapy:  Pulse Oximetry: 95 %, O2 (LPM): 0, O2 Delivery: None (Room Air)    Physical Exam   Constitutional:   Resting, combative if examined, refusing to open eyes   HENT:   Head: Normocephalic and atraumatic.   Eyes: EOM are normal.   Neck: Normal range of motion.   Cardiovascular: Normal rate, regular rhythm, normal heart sounds and intact distal pulses.    No murmur heard.  Pulmonary/Chest: Effort normal and breath sounds normal. No respiratory distress. He has no wheezes. He has no rales.   Abdominal: Soft. Bowel sounds are normal. He exhibits no distension and no mass. There is no tenderness. There is no rebound and no guarding.   Musculoskeletal: He exhibits no edema.   Neurological:   Not oriented   Skin: Skin is warm and dry. No erythema.   Psychiatric:   Agitated         Lab Data Review:      6/19/2017  1:50 PM    Recent Labs      06/17/17   0308  06/18/17 0417  06/19/17   0438   SODIUM  139  137  138   POTASSIUM  3.3*  3.0*  3.4*   CHLORIDE  109  107  109   CO2  25  26  23   BUN  10  8  6*   CREATININE  0.58  0.60  0.57   MAGNESIUM   --   1.6  2.0   PHOSPHORUS   --   1.9*  2.2*   CALCIUM  7.3*  6.9*  7.1*       Recent Labs      06/17/17   0308  06/18/17   0417  06/19/17   0438   GLUCOSE  116*  121*  94       Recent Labs      06/17/17   0309  06/18/17   0417  06/19/17   0438   RBC  4.05*  4.03*  4.31*   HEMOGLOBIN   12.3*  12.0*  12.9*   HEMATOCRIT  36.3*  36.2*  38.6*   PLATELETCT  170  204  200       Recent Labs      06/17/17   0309  06/18/17   0417  06/19/17   0438   WBC  18.0*  10.7  10.4   NEUTSPOLYS  82.70*  77.00*  73.60*   LYMPHOCYTES  7.10*  10.30*  11.50*   MONOCYTES  7.70  8.50  10.10   EOSINOPHILS  1.10  2.80  3.10   BASOPHILS  0.40  0.60  0.80           Assessment/Plan     C. difficile diarrhea  Assessment & Plan  severe C.diff/NAP-1 strain   - Several day history of diarrhea in setting of recent abx use  - WBC 17 on admit >> 10.3 today  - C. Diff PCR, NAP1 positive  - Lactic acid 1.1, repeat is 1.2  - Blood Cx: NGTD  - KUB on admission: mildly dilated air-filled loops of small bowel, ileus vs. Intermittent or partial obstruction.   - Repeat KUB: Diffuse air-filled distention of small bowel, appearance suggests ileus or enteritis, evolving obstructive changes not excluded.  - CT Abd: Small bilateral pleural effusions with atelectasis versus consolidation. Changes compatible with severe colitis, correlating with history.  - Surgery recs: patient is now stable, continue medical management, will sign off  - ID recs: Dc'd IV metronidazole, continue PO vanco for total of 14 days, will sign off   - Patient has been refusing PO meds including vanco, very agitated and uncooperative   - Still having loose stools, starting to have texture. Abdominal exam benign, clinically stable but condition remains guarded as this is a severe strain.   Plan:   -Continue PO Vancomycin. LIANE Partida to visit patient today to see if they can convince him to take his PO vanc. Ok to mix vanc with food. If patient continues to refuse  will try inserting ayana, LIANE updated and agrees to this plan. Discussed case with ID, Dr. Mejia, who states IV metro is inadequate treatment and patient needs PO vanc.   -Continue IV fluids   -Nystatin/triamcinolone for perineal irritation       Hypokalemia  Assessment & Plan  -Likely 2/2  diarrhea  -Has received total 160 meq KCl since admission  -3.0 yesterday >> 3.4 today. Will continue to supplement  -Switched D5NS to NS. Will continue maintenance NS          Hypomagnesemia  Assessment & Plan  -1.6 yesterday >> 2.0 today  -received 4mg IV Mag. Will continue to supplement as needed.      Late onset Alzheimer's disease with behavioral disturbance  Assessment & Plan  -Home meds: seroquel 25mg bid, ativan 1mg PRN, depakote 125mg qid  -Confused and altered on admission, was slowly improving but overnight patient is becoming more aggressive, agitated, uncooperative.   -Received 4mg total Ativan overnight, was more difficult to arouse this morning but ABG and O2sats were appropriate, likely sedative effects taking longer to clear in this patient.   Plan:   -continue depakote home dose, resume seroquel 25mg bid, will also add PRN seroquel   -DC ativan, use PO seroquel first if he will take PO and IV Haldol PRN for acute agitation if he refuses PO.    Anemia  Assessment & Plan  -12.3 on admission, stable since.  -May be 2/2 iron deficiency vs B12/folate deficiency vs ACD. May also be 2/2 depakote side effect.  -Can pursue these causes after acute stabilization of C diff colitis. Will continue fluids and multivitamins.

## 2017-06-19 NOTE — PROGRESS NOTES
Assumed care of patient. Assessment complete. Patient is non-verbal at this time. Sleeping, unlabored breathing and calm at this time. Educated to use call light for assistance. Fall precautions in place. Will continue to monitor with hourly rounding.

## 2017-06-19 NOTE — CARE PLAN
Problem: Safety  Goal: Will remain free from injury  Outcome: PROGRESSING AS EXPECTED  Patient educated to use call light for assistance. Fall precautions in place. Staff will assist with mobilization. Hourly rounding in place.     Problem: Infection  Goal: Will remain free from infection  Outcome: PROGRESSING AS EXPECTED  Proper hand hygiene before and after patient care to ensure patient will remain free from infection.

## 2017-06-20 LAB
ALBUMIN SERPL BCP-MCNC: 2.1 G/DL (ref 3.2–4.9)
ALBUMIN/GLOB SERPL: 0.9 G/DL
ALP SERPL-CCNC: 29 U/L (ref 30–99)
ALT SERPL-CCNC: 32 U/L (ref 2–50)
ANION GAP SERPL CALC-SCNC: 3 MMOL/L (ref 0–11.9)
AST SERPL-CCNC: 42 U/L (ref 12–45)
BACTERIA BLD CULT: NORMAL
BACTERIA BLD CULT: NORMAL
BILIRUB SERPL-MCNC: 0.2 MG/DL (ref 0.1–1.5)
BUN SERPL-MCNC: 11 MG/DL (ref 8–22)
CALCIUM SERPL-MCNC: 7.1 MG/DL (ref 8.5–10.5)
CHLORIDE SERPL-SCNC: 109 MMOL/L (ref 96–112)
CO2 SERPL-SCNC: 23 MMOL/L (ref 20–33)
CREAT SERPL-MCNC: 0.7 MG/DL (ref 0.5–1.4)
EKG IMPRESSION: NORMAL
GFR SERPL CREATININE-BSD FRML MDRD: >60 ML/MIN/1.73 M 2
GLOBULIN SER CALC-MCNC: 2.3 G/DL (ref 1.9–3.5)
GLUCOSE SERPL-MCNC: 138 MG/DL (ref 65–99)
MAGNESIUM SERPL-MCNC: 1.7 MG/DL (ref 1.5–2.5)
PHOSPHATE SERPL-MCNC: 2.7 MG/DL (ref 2.5–4.5)
POTASSIUM SERPL-SCNC: 3.4 MMOL/L (ref 3.6–5.5)
PROCALCITONIN SERPL-MCNC: 0.15 NG/ML
PROT SERPL-MCNC: 4.4 G/DL (ref 6–8.2)
SIGNIFICANT IND 70042: NORMAL
SIGNIFICANT IND 70042: NORMAL
SITE SITE: NORMAL
SITE SITE: NORMAL
SODIUM SERPL-SCNC: 135 MMOL/L (ref 135–145)
SOURCE SOURCE: NORMAL
SOURCE SOURCE: NORMAL
TROPONIN I SERPL-MCNC: <0.01 NG/ML (ref 0–0.04)

## 2017-06-20 PROCEDURE — A9270 NON-COVERED ITEM OR SERVICE: HCPCS | Performed by: INTERNAL MEDICINE

## 2017-06-20 PROCEDURE — 99233 SBSQ HOSP IP/OBS HIGH 50: CPT | Performed by: INTERNAL MEDICINE

## 2017-06-20 PROCEDURE — 700102 HCHG RX REV CODE 250 W/ 637 OVERRIDE(OP): Performed by: STUDENT IN AN ORGANIZED HEALTH CARE EDUCATION/TRAINING PROGRAM

## 2017-06-20 PROCEDURE — 700102 HCHG RX REV CODE 250 W/ 637 OVERRIDE(OP): Performed by: INTERNAL MEDICINE

## 2017-06-20 PROCEDURE — 770020 HCHG ROOM/CARE - TELE (206)

## 2017-06-20 PROCEDURE — 700111 HCHG RX REV CODE 636 W/ 250 OVERRIDE (IP): Performed by: INTERNAL MEDICINE

## 2017-06-20 PROCEDURE — 700101 HCHG RX REV CODE 250: Performed by: INTERNAL MEDICINE

## 2017-06-20 PROCEDURE — A9270 NON-COVERED ITEM OR SERVICE: HCPCS | Performed by: STUDENT IN AN ORGANIZED HEALTH CARE EDUCATION/TRAINING PROGRAM

## 2017-06-20 PROCEDURE — 700105 HCHG RX REV CODE 258: Performed by: INTERNAL MEDICINE

## 2017-06-20 PROCEDURE — 306565 RIGID MIT RESTRAINT(PAIR): Performed by: INTERNAL MEDICINE

## 2017-06-20 RX ORDER — POTASSIUM CHLORIDE 1.5 G/1.58G
40 POWDER, FOR SOLUTION ORAL ONCE
Status: COMPLETED | OUTPATIENT
Start: 2017-06-20 | End: 2017-06-20

## 2017-06-20 RX ORDER — POTASSIUM CHLORIDE 1.5 G/1.58G
40 POWDER, FOR SOLUTION ORAL DAILY
Status: DISCONTINUED | OUTPATIENT
Start: 2017-06-20 | End: 2017-06-22

## 2017-06-20 RX ADMIN — POTASSIUM CHLORIDE 40 MEQ: 1.5 POWDER, FOR SOLUTION ORAL at 03:12

## 2017-06-20 RX ADMIN — SODIUM CHLORIDE: 9 INJECTION, SOLUTION INTRAVENOUS at 20:38

## 2017-06-20 RX ADMIN — DIVALPROEX SODIUM 125 MG: 125 CAPSULE, COATED PELLETS ORAL at 05:20

## 2017-06-20 RX ADMIN — VANCOMYCIN HYDROCHLORIDE 125 MG: 10 INJECTION, POWDER, LYOPHILIZED, FOR SOLUTION INTRAVENOUS at 17:42

## 2017-06-20 RX ADMIN — TAMSULOSIN HYDROCHLORIDE 0.8 MG: 0.4 CAPSULE ORAL at 20:46

## 2017-06-20 RX ADMIN — METRONIDAZOLE 500 MG: 500 INJECTION, SOLUTION INTRAVENOUS at 20:45

## 2017-06-20 RX ADMIN — METRONIDAZOLE 500 MG: 500 INJECTION, SOLUTION INTRAVENOUS at 15:20

## 2017-06-20 RX ADMIN — METRONIDAZOLE 500 MG: 500 INJECTION, SOLUTION INTRAVENOUS at 05:20

## 2017-06-20 RX ADMIN — DIBASIC SODIUM PHOSPHATE, MONOBASIC POTASSIUM PHOSPHATE AND MONOBASIC SODIUM PHOSPHATE 1 TABLET: 852; 155; 130 TABLET ORAL at 20:46

## 2017-06-20 RX ADMIN — VANCOMYCIN HYDROCHLORIDE 125 MG: 10 INJECTION, POWDER, LYOPHILIZED, FOR SOLUTION INTRAVENOUS at 05:20

## 2017-06-20 RX ADMIN — POTASSIUM CHLORIDE 40 MEQ: 1.5 POWDER, FOR SOLUTION ORAL at 17:43

## 2017-06-20 RX ADMIN — QUETIAPINE FUMARATE 50 MG: 25 TABLET ORAL at 10:17

## 2017-06-20 RX ADMIN — THERA TABS 1 TABLET: TAB at 08:08

## 2017-06-20 RX ADMIN — VANCOMYCIN HYDROCHLORIDE 125 MG: 10 INJECTION, POWDER, LYOPHILIZED, FOR SOLUTION INTRAVENOUS at 23:45

## 2017-06-20 RX ADMIN — QUETIAPINE FUMARATE 25 MG: 25 TABLET ORAL at 20:46

## 2017-06-20 RX ADMIN — NYSTATIN AND TRIAMCINOLONE ACETONIDE: 100000; 1 CREAM TOPICAL at 20:47

## 2017-06-20 RX ADMIN — DIVALPROEX SODIUM 125 MG: 125 CAPSULE, COATED PELLETS ORAL at 23:45

## 2017-06-20 RX ADMIN — DIBASIC SODIUM PHOSPHATE, MONOBASIC POTASSIUM PHOSPHATE AND MONOBASIC SODIUM PHOSPHATE 1 TABLET: 852; 155; 130 TABLET ORAL at 15:20

## 2017-06-20 RX ADMIN — DIVALPROEX SODIUM 125 MG: 125 CAPSULE, COATED PELLETS ORAL at 17:43

## 2017-06-20 RX ADMIN — DIBASIC SODIUM PHOSPHATE, MONOBASIC POTASSIUM PHOSPHATE AND MONOBASIC SODIUM PHOSPHATE 1 TABLET: 852; 155; 130 TABLET ORAL at 08:07

## 2017-06-20 RX ADMIN — QUETIAPINE FUMARATE 25 MG: 25 TABLET ORAL at 08:08

## 2017-06-20 RX ADMIN — VANCOMYCIN HYDROCHLORIDE 125 MG: 10 INJECTION, POWDER, LYOPHILIZED, FOR SOLUTION INTRAVENOUS at 12:00

## 2017-06-20 RX ADMIN — HALOPERIDOL LACTATE 2 MG: 5 INJECTION, SOLUTION INTRAMUSCULAR at 10:42

## 2017-06-20 RX ADMIN — DIVALPROEX SODIUM 125 MG: 125 CAPSULE, COATED PELLETS ORAL at 11:59

## 2017-06-20 RX ADMIN — HALOPERIDOL LACTATE 5 MG: 5 INJECTION, SOLUTION INTRAMUSCULAR at 21:40

## 2017-06-20 RX ADMIN — HALOPERIDOL LACTATE 5 MG: 5 INJECTION, SOLUTION INTRAMUSCULAR at 10:22

## 2017-06-20 RX ADMIN — SODIUM CHLORIDE: 9 INJECTION, SOLUTION INTRAVENOUS at 03:08

## 2017-06-20 RX ADMIN — HALOPERIDOL LACTATE 5 MG: 5 INJECTION, SOLUTION INTRAMUSCULAR at 15:07

## 2017-06-20 ASSESSMENT — PAIN SCALES - GENERAL
PAINLEVEL_OUTOF10: 0

## 2017-06-20 NOTE — DISCHARGE PLANNING
Medical Social Work    Update: Pt from [facility] assisted living. Pt has leyla and  that are very involved in pt's plan of care. Pt may need PT/OT eval to determine if [facility] can accept pt back.

## 2017-06-20 NOTE — PROGRESS NOTES
[facility] [place] [facility] Medicine Interval Note    Name Neymar Anderson     1945   Age/Sex 71 y.o. male   MRN [id number]   Code Status Full     After 5PM or if no immediate response to page, please call for cross-coverage  Attending/Team: Dr. Ibrahim/Evangelista Call [phone] to page   1st Call - Day Intern (R1):   Dr. Newell 2nd Call - Day Sr. Resident (R2/R3):   Dr. Minaya         Chief complaint/ reason for interval visit (Primary Diagnosis)   Diarrhea, fever/C diff colitis    Interval Problem Daily Status Update      24h events: Had spike in temp 100.8 overnight with , BP stable at 110/76. Night team assessed patient and physical exam was benign. ECG, troponin, lactic acid, procalcitonin negative. CXR showed increased infiltrates from prior study. IV metronidazole restarted. Blood cultures drawn. Since then, vital signs have stabilized. During morning rounds, patient was seen to be agitated while nurses were cleaning his incontinent stools, was fighting and required haldol 5mg IV push but IV seemed to not be working, added 2mg IM.     Subjective: Cannot assess from patient as he is demented. Per nurses, patient continues to be agitated and aggressive, able to pull on restraints and get himself free. Was initially refusing PO meds, but has now taken his scheduled meds including Vanco since yesterday afternoon. Continues to have loose stools, not watery or bloody.    Review of Systems   Unable to perform ROS: dementia     Consultants/Specialty  Dr. Mejia/ID  Dr. Ross/General Surgery    Disposition  Guarded. Continued inpatient for treatment of C diff colitis, electrolyte repletion.    Quality Measures  EKG reviewed, Labs reviewed, Medications reviewed and Radiology images reviewed  Holland catheter: No Holland        DVT prophylaxis - mechanical: SCDs  Ulcer prophylaxis: No  Antibiotics: Treating active infection/contamination beyond 24 hours perioperative coverage      Physical Exam        Filed Vitals:    06/19/17 2152 06/20/17 0000 06/20/17 0400 06/20/17 0800   BP:  112/64 108/63 120/64   Pulse: 101 99 94 106   Temp: 37.5 °C (99.5 °F) 37.1 °C (98.7 °F) 36.9 °C (98.5 °F) 36.8 °C (98.2 °F)   Resp:  20 18 19   Height:       Weight:       SpO2:  93% 92% 92%     Body mass index is 23.6 kg/(m^2). Weight: 70.4 kg (155 lb 3.3 oz)  Oxygen Therapy:  Pulse Oximetry: 92 %, O2 (LPM): 0, O2 Delivery: None (Room Air)    Physical Exam   Constitutional:   Awake, agitated and verbally aggressive while being cleaned.   HENT:   Head: Normocephalic and atraumatic.   Eyes: EOM are normal.   Neck: Normal range of motion.   Cardiovascular: Normal rate, regular rhythm, normal heart sounds and intact distal pulses.    No murmur heard.  Pulmonary/Chest: Effort normal and breath sounds normal. No respiratory distress. He has no wheezes. He has no rales.   Abdominal: Soft. Bowel sounds are normal. He exhibits no distension and no mass. There is no rebound and no guarding.   Musculoskeletal: Normal range of motion. He exhibits no edema.   Neurological:   Not oriented   Skin: Skin is warm and dry. No erythema.     Lab Data Review:      6/20/2017  1:15 PM    Recent Labs      06/18/17 0417 06/19/17 0438 06/19/17 2329   SODIUM  137  138  135   POTASSIUM  3.0*  3.4*  3.4*   CHLORIDE  107  109  109   CO2  26  23  23   BUN  8  6*  11   CREATININE  0.60  0.57  0.70   MAGNESIUM  1.6  2.0  1.7   PHOSPHORUS  1.9*  2.2*  2.7   CALCIUM  6.9*  7.1*  7.1*       Recent Labs      06/18/17 0417 06/19/17 0438 06/19/17   2329   ALTSGPT   --    --   32   ASTSGOT   --    --   42   ALKPHOSPHAT   --    --   29*   TBILIRUBIN   --    --   0.2   GLUCOSE  121*  94  138*       Recent Labs      06/18/17 0417 06/19/17 0438 06/19/17   2329   RBC  4.03*  4.31*  4.16*   HEMOGLOBIN  12.0*  12.9*  12.5*   HEMATOCRIT  36.2*  38.6*  37.3*   PLATELETCT  204  200  211       Recent Labs      06/18/17   0417  06/19/17   0438  06/19/17   2323    WBC  10.7  10.4  12.2*   NEUTSPOLYS  77.00*  73.60*  77.60*   LYMPHOCYTES  10.30*  11.50*  10.70*   MONOCYTES  8.50  10.10  8.50   EOSINOPHILS  2.80  3.10  1.80   BASOPHILS  0.60  0.80  0.70   ASTSGOT   --    --   42   ALTSGPT   --    --   32   ALKPHOSPHAT   --    --   29*   TBILIRUBIN   --    --   0.2     Assessment/Plan     C. difficile diarrhea  Assessment & Plan  severe C.diff/NAP-1 strain   - Several day history of diarrhea in setting of recent abx use  - Leukocytosis on admission trended down but acute elevation overnight to 12.2 from 10.4  - Blood Cx drawn on admission: No growth at 5 days.  - KUB on admission: mildly dilated air-filled loops of small bowel, ileus vs. Intermittent or partial obstruction.   - Repeat KUB: Diffuse air-filled distention of small bowel, appearance suggests ileus or enteritis, evolving obstructive changes not excluded.  - CT Abd: Small bilateral pleural effusions with atelectasis versus consolidation. Changes compatible with severe colitis, correlating with history.  - Surgery recs: patient is now stable, continue medical management, will sign off  - ID recs: Dc'd IV metronidazole, continue PO vanco for total of 14 days, will sign off   - Patient has missed two doses of PO vanco due to agitation and refusal, DPOA made aware and agreed to insert a Cortrak if patient continues to refuse. Since yesterday afternoon, patient has agreed to take meds.  - Still having loose stools. Abdominal exam benign, clinically stable but condition remains guarded as this is a severe strain.  - Acute spike in temp overnight 100.8 with , VS stable since then. Repeat studies: ECG, trop, procalcitonin, lactic acid all negative.  - 6/19 CXR: Worsening L lung base consolidation, hazy opacities in R lower lung, small pleural effusion is new or increased.  - Restarted IV metronidazole after fever spike and CXR  - New pneumonia still unlikely, patient has been bed bound and receiving fluids so likely  atelectasis, dependent edema component.    Plan:   -Continue PO Vancomycin, IV metronidazole.   -Continue IV fluids, will decrease rate    -Nystatin/triamcinolone for perineal irritation   -Called and updated Elida who updates his DPOA's Jh and Damon. Patient benefiting from visits from people he knows to encourage him to take his PO vanc.    Hypokalemia  Assessment & Plan  -Likely 2/2 diarrhea  -Will continue with repletion, KCl 40meq qd    Hypomagnesemia  Assessment & Plan  -1.6 yesterday >> 2.0 today  -received 4mg IV Mag. Will continue to supplement as needed.    Late onset Alzheimer's disease with behavioral disturbance  Assessment & Plan  -Home meds: seroquel 25mg bid, ativan 1mg PRN, depakote 125mg qid  -Confused and altered on admission, was slowly improving but overnight patient is becoming more aggressive, agitated, uncooperative.   -Still aggressive and agitated, required Haldol this morning   Plan:   -continue depakote home dose, resume seroquel 25mg bid, will also add PRN seroquel, can try using this first before haldol   -DC ativan, use Haldol PRN for agitation    Anemia  Assessment & Plan  -12.3 on admission, stable since.  -May be 2/2 iron deficiency vs B12/folate deficiency vs ACD. May also be 2/2 depakote side effect.  -Can pursue these causes after acute stabilization of C diff colitis. Will continue fluids and multivitamins.

## 2017-06-20 NOTE — CARE PLAN
Problem: Nutritional:  Goal: Achieve adequate nutritional intake  Patient will consume 50% of meals   Outcome: PROGRESSING AS EXPECTED  Pt doing well with supplements and consuming % of most meals with minimal refusals.

## 2017-06-20 NOTE — PROGRESS NOTES
Patient spiked fever 100.8 F with tachycardia. Evaluated patient at bedside. Patient has dementia. He is a very poor historian. Denies any complains.   Filed Vitals:    06/19/17 1133 06/19/17 1630 06/19/17 2000 06/19/17 2152   BP: 105/57 114/52 110/76    Pulse: 85 111 127 101   Temp: 36.8 °C (98.2 °F) 37.6 °C (99.6 °F) 38.2 °C (100.8 °F) 37.5 °C (99.5 °F)   Resp: 18 18 16    Height:       Weight:   70.4 kg (155 lb 3.3 oz)    SpO2: 95% 92% 91%      EKG:  sinus tachycardia, QTc 471 borderline T wave abnormalities. No significant changes except for HR.    Tachycardic, lungs clear, abdomen soft, no rebound.     WBC 12.2 Troponin less than 0.01, procalcitonin 0.15, potassium 3.4, lactic acid 1.5    CXR:   1.  Persistent hypoinflation with worsening LEFT lung base consolidation which may represent atelectasis or pneumonia.  2.  Hazy opacities in the RIGHT lower lung suggests small posterior layering pleural effusion, likely new or increased from prior exam.    - Restart IV flagyl to cover for possible worsening C Diff and worsening left lung base consolidation  - incentive spirometry  - replace K  - pending blood cultures  - continue vancomycin PO.    - continue to monitor

## 2017-06-21 LAB
ALBUMIN SERPL BCP-MCNC: 2.3 G/DL (ref 3.2–4.9)
ALBUMIN/GLOB SERPL: 0.9 G/DL
ALP SERPL-CCNC: 26 U/L (ref 30–99)
ALT SERPL-CCNC: 31 U/L (ref 2–50)
ANION GAP SERPL CALC-SCNC: 7 MMOL/L (ref 0–11.9)
AST SERPL-CCNC: 34 U/L (ref 12–45)
BACTERIA BLD CULT: NORMAL
BACTERIA BLD CULT: NORMAL
BASOPHILS # BLD AUTO: 0.6 % (ref 0–1.8)
BASOPHILS # BLD: 0.07 K/UL (ref 0–0.12)
BILIRUB SERPL-MCNC: 0.3 MG/DL (ref 0.1–1.5)
BUN SERPL-MCNC: 7 MG/DL (ref 8–22)
CALCIUM SERPL-MCNC: 7.6 MG/DL (ref 8.5–10.5)
CHLORIDE SERPL-SCNC: 108 MMOL/L (ref 96–112)
CO2 SERPL-SCNC: 25 MMOL/L (ref 20–33)
CREAT SERPL-MCNC: 0.63 MG/DL (ref 0.5–1.4)
EKG IMPRESSION: NORMAL
EKG IMPRESSION: NORMAL
EOSINOPHIL # BLD AUTO: 0.4 K/UL (ref 0–0.51)
EOSINOPHIL NFR BLD: 3.4 % (ref 0–6.9)
ERYTHROCYTE [DISTWIDTH] IN BLOOD BY AUTOMATED COUNT: 46.8 FL (ref 35.9–50)
GFR SERPL CREATININE-BSD FRML MDRD: >60 ML/MIN/1.73 M 2
GLOBULIN SER CALC-MCNC: 2.5 G/DL (ref 1.9–3.5)
GLUCOSE SERPL-MCNC: 106 MG/DL (ref 65–99)
HCT VFR BLD AUTO: 39.1 % (ref 42–52)
HGB BLD-MCNC: 13.1 G/DL (ref 14–18)
IMM GRANULOCYTES # BLD AUTO: 0.15 K/UL (ref 0–0.11)
IMM GRANULOCYTES NFR BLD AUTO: 1.3 % (ref 0–0.9)
LYMPHOCYTES # BLD AUTO: 1.62 K/UL (ref 1–4.8)
LYMPHOCYTES NFR BLD: 14 % (ref 22–41)
MAGNESIUM SERPL-MCNC: 1.7 MG/DL (ref 1.5–2.5)
MCH RBC QN AUTO: 29.8 PG (ref 27–33)
MCHC RBC AUTO-ENTMCNC: 33.5 G/DL (ref 33.7–35.3)
MCV RBC AUTO: 89.1 FL (ref 81.4–97.8)
MONOCYTES # BLD AUTO: 1.11 K/UL (ref 0–0.85)
MONOCYTES NFR BLD AUTO: 9.6 % (ref 0–13.4)
NEUTROPHILS # BLD AUTO: 8.26 K/UL (ref 1.82–7.42)
NEUTROPHILS NFR BLD: 71.1 % (ref 44–72)
NRBC # BLD AUTO: 0 K/UL
NRBC BLD AUTO-RTO: 0 /100 WBC
PHOSPHATE SERPL-MCNC: 3.1 MG/DL (ref 2.5–4.5)
PLATELET # BLD AUTO: 234 K/UL (ref 164–446)
PMV BLD AUTO: 9.8 FL (ref 9–12.9)
POTASSIUM SERPL-SCNC: 3.6 MMOL/L (ref 3.6–5.5)
PROT SERPL-MCNC: 4.8 G/DL (ref 6–8.2)
RBC # BLD AUTO: 4.39 M/UL (ref 4.7–6.1)
SIGNIFICANT IND 70042: NORMAL
SIGNIFICANT IND 70042: NORMAL
SITE SITE: NORMAL
SITE SITE: NORMAL
SODIUM SERPL-SCNC: 140 MMOL/L (ref 135–145)
SOURCE SOURCE: NORMAL
SOURCE SOURCE: NORMAL
WBC # BLD AUTO: 11.6 K/UL (ref 4.8–10.8)

## 2017-06-21 PROCEDURE — 80053 COMPREHEN METABOLIC PANEL: CPT

## 2017-06-21 PROCEDURE — 93005 ELECTROCARDIOGRAM TRACING: CPT | Performed by: INTERNAL MEDICINE

## 2017-06-21 PROCEDURE — A9270 NON-COVERED ITEM OR SERVICE: HCPCS | Performed by: HOSPITALIST

## 2017-06-21 PROCEDURE — 700105 HCHG RX REV CODE 258: Performed by: INTERNAL MEDICINE

## 2017-06-21 PROCEDURE — 85025 COMPLETE CBC W/AUTO DIFF WBC: CPT

## 2017-06-21 PROCEDURE — 302099 OSTOMY PASTE: Performed by: INTERNAL MEDICINE

## 2017-06-21 PROCEDURE — A9270 NON-COVERED ITEM OR SERVICE: HCPCS | Performed by: STUDENT IN AN ORGANIZED HEALTH CARE EDUCATION/TRAINING PROGRAM

## 2017-06-21 PROCEDURE — 700102 HCHG RX REV CODE 250 W/ 637 OVERRIDE(OP): Performed by: STUDENT IN AN ORGANIZED HEALTH CARE EDUCATION/TRAINING PROGRAM

## 2017-06-21 PROCEDURE — 84100 ASSAY OF PHOSPHORUS: CPT

## 2017-06-21 PROCEDURE — 83735 ASSAY OF MAGNESIUM: CPT

## 2017-06-21 PROCEDURE — A9270 NON-COVERED ITEM OR SERVICE: HCPCS | Performed by: INTERNAL MEDICINE

## 2017-06-21 PROCEDURE — 700111 HCHG RX REV CODE 636 W/ 250 OVERRIDE (IP): Performed by: INTERNAL MEDICINE

## 2017-06-21 PROCEDURE — 51798 US URINE CAPACITY MEASURE: CPT

## 2017-06-21 PROCEDURE — 36415 COLL VENOUS BLD VENIPUNCTURE: CPT

## 2017-06-21 PROCEDURE — 302146: Performed by: INTERNAL MEDICINE

## 2017-06-21 PROCEDURE — 700102 HCHG RX REV CODE 250 W/ 637 OVERRIDE(OP): Performed by: INTERNAL MEDICINE

## 2017-06-21 PROCEDURE — 99233 SBSQ HOSP IP/OBS HIGH 50: CPT | Performed by: INTERNAL MEDICINE

## 2017-06-21 PROCEDURE — 700102 HCHG RX REV CODE 250 W/ 637 OVERRIDE(OP): Performed by: HOSPITALIST

## 2017-06-21 PROCEDURE — 700101 HCHG RX REV CODE 250: Performed by: INTERNAL MEDICINE

## 2017-06-21 PROCEDURE — 304222 HCHG STAT ISOLATION DAILY CHARGE

## 2017-06-21 PROCEDURE — 93010 ELECTROCARDIOGRAM REPORT: CPT | Performed by: INTERNAL MEDICINE

## 2017-06-21 PROCEDURE — 93005 ELECTROCARDIOGRAM TRACING: CPT | Performed by: HOSPITALIST

## 2017-06-21 PROCEDURE — 770020 HCHG ROOM/CARE - TELE (206)

## 2017-06-21 PROCEDURE — 93010 ELECTROCARDIOGRAM REPORT: CPT | Mod: 77 | Performed by: INTERNAL MEDICINE

## 2017-06-21 RX ORDER — QUETIAPINE FUMARATE 25 MG/1
50 TABLET, FILM COATED ORAL 2 TIMES DAILY
Status: DISCONTINUED | OUTPATIENT
Start: 2017-06-21 | End: 2017-06-24

## 2017-06-21 RX ORDER — MAGNESIUM SULFATE HEPTAHYDRATE 40 MG/ML
2 INJECTION, SOLUTION INTRAVENOUS ONCE
Status: COMPLETED | OUTPATIENT
Start: 2017-06-21 | End: 2017-06-21

## 2017-06-21 RX ORDER — QUETIAPINE FUMARATE 25 MG/1
50 TABLET, FILM COATED ORAL EVERY 6 HOURS PRN
Status: DISCONTINUED | OUTPATIENT
Start: 2017-06-21 | End: 2017-06-30 | Stop reason: HOSPADM

## 2017-06-21 RX ORDER — HALOPERIDOL 5 MG/ML
5 INJECTION INTRAMUSCULAR ONCE
Status: ACTIVE | OUTPATIENT
Start: 2017-06-21 | End: 2017-06-22

## 2017-06-21 RX ADMIN — DIVALPROEX SODIUM 125 MG: 125 CAPSULE, COATED PELLETS ORAL at 18:30

## 2017-06-21 RX ADMIN — SODIUM CHLORIDE: 9 INJECTION, SOLUTION INTRAVENOUS at 07:48

## 2017-06-21 RX ADMIN — METRONIDAZOLE 500 MG: 500 INJECTION, SOLUTION INTRAVENOUS at 05:24

## 2017-06-21 RX ADMIN — DIVALPROEX SODIUM 125 MG: 125 CAPSULE, COATED PELLETS ORAL at 12:28

## 2017-06-21 RX ADMIN — QUETIAPINE FUMARATE 50 MG: 25 TABLET ORAL at 20:25

## 2017-06-21 RX ADMIN — HALOPERIDOL LACTATE 5 MG: 5 INJECTION, SOLUTION INTRAMUSCULAR at 16:14

## 2017-06-21 RX ADMIN — DIVALPROEX SODIUM 125 MG: 125 CAPSULE, COATED PELLETS ORAL at 05:24

## 2017-06-21 RX ADMIN — TAMSULOSIN HYDROCHLORIDE 0.8 MG: 0.4 CAPSULE ORAL at 20:25

## 2017-06-21 RX ADMIN — POTASSIUM CHLORIDE 40 MEQ: 1.5 POWDER, FOR SOLUTION ORAL at 07:39

## 2017-06-21 RX ADMIN — HALOPERIDOL LACTATE 5 MG: 5 INJECTION, SOLUTION INTRAMUSCULAR at 01:40

## 2017-06-21 RX ADMIN — QUETIAPINE FUMARATE 50 MG: 25 TABLET ORAL at 13:38

## 2017-06-21 RX ADMIN — VANCOMYCIN HYDROCHLORIDE 125 MG: 10 INJECTION, POWDER, LYOPHILIZED, FOR SOLUTION INTRAVENOUS at 18:30

## 2017-06-21 RX ADMIN — METRONIDAZOLE 500 MG: 500 INJECTION, SOLUTION INTRAVENOUS at 13:45

## 2017-06-21 RX ADMIN — VANCOMYCIN HYDROCHLORIDE 125 MG: 10 INJECTION, POWDER, LYOPHILIZED, FOR SOLUTION INTRAVENOUS at 12:29

## 2017-06-21 RX ADMIN — QUETIAPINE FUMARATE 25 MG: 25 TABLET ORAL at 07:39

## 2017-06-21 RX ADMIN — METRONIDAZOLE 500 MG: 500 INJECTION, SOLUTION INTRAVENOUS at 22:59

## 2017-06-21 RX ADMIN — DIBASIC SODIUM PHOSPHATE, MONOBASIC POTASSIUM PHOSPHATE AND MONOBASIC SODIUM PHOSPHATE 1 TABLET: 852; 155; 130 TABLET ORAL at 07:39

## 2017-06-21 RX ADMIN — THERA TABS 1 TABLET: TAB at 07:39

## 2017-06-21 RX ADMIN — VANCOMYCIN HYDROCHLORIDE 125 MG: 10 INJECTION, POWDER, LYOPHILIZED, FOR SOLUTION INTRAVENOUS at 05:24

## 2017-06-21 RX ADMIN — MAGNESIUM SULFATE IN WATER 2 G: 40 INJECTION, SOLUTION INTRAVENOUS at 07:39

## 2017-06-21 ASSESSMENT — PAIN SCALES - GENERAL
PAINLEVEL_OUTOF10: 0

## 2017-06-21 NOTE — PROGRESS NOTES
[facility] [place] [facility] [place]    Name Neymar Anderson     1945   Age/Sex 71 y.o. male   MRN [id number]   Code Status Full     After 5PM or if no immediate response to page, please call for cross-coverage  Attending/Team: Dr. Ibrahim/Evangelista Call [phone] to page   1st Call - Day Intern (R1):   Dr. Newell 2nd Call - Day Sr. Resident (R2/R3):   Dr. Minaya         Chief complaint/ reason for interval visit (Primary Diagnosis)   Diarrhea, fever/C diff colitis    Interval Problem Daily Status Update      24h events: Patient required Haldol 3x yesterday and once overnight. Otherwise, no acute events.    Subjective: Cannot assess from patient as he is demented. Per nurses, patient continues to be agitated but they have been successful in calming him down and he has agreed to take all of his PO meds. Continues to have loose stools, but decreasing in frequency and getting formed. Patient continues to deny any pain.    Review of Systems   Unable to perform ROS: dementia       Consultants/Specialty  Dr. Mejia/ID  Dr. Ross/[facility] Surgery    Disposition  Guarded. Continued inpatient for treatment of C diff colitis, electrolyte repletion.    Quality Measures  EKG reviewed, Labs reviewed, Medications reviewed and Radiology images reviewed  Holladn catheter: No Holland        DVT prophylaxis - mechanical: SCDs  Ulcer prophylaxis: No  Antibiotics: Treating active infection/contamination beyond 24 hours perioperative coverage      Physical Exam       Filed Vitals:    17 1931 17 0000 17 0544 17 0800   BP: 164/85 112/72 109/69 106/65   Pulse: 102 97 96 96   Temp: 36.6 °C (97.9 °F) 37.1 °C (98.7 °F) 36.7 °C (98.1 °F) 36.5 °C (97.7 °F)   Resp:  18 17 19   Height:       Weight:   72 kg (158 lb 11.7 oz)    SpO2: 96% 96% 97% 94%     Body mass index is 24.14 kg/(m^2). Weight: 72 kg (158 lb 11.7 oz)  Oxygen Therapy:  Pulse Oximetry: 94 %, O2 (LPM): 0, O2 Delivery: None  (Room Air)    Physical Exam   Constitutional: No distress.   HENT:   Head: Normocephalic and atraumatic.   Eyes: EOM are normal.   Neck: Normal range of motion.   Cardiovascular: Normal rate, regular rhythm, normal heart sounds and intact distal pulses.    No murmur heard.  Pulmonary/Chest: Effort normal and breath sounds normal. No respiratory distress. He has no wheezes. He has no rales.   Abdominal: Soft. Bowel sounds are normal. He exhibits no distension and no mass. There is no tenderness. There is no rebound and no guarding.   Musculoskeletal: He exhibits no edema.   Neurological:   Alert, not oriented.   Skin: Skin is warm and dry. No rash noted. No erythema.   Psychiatric: Affect normal.         Lab Data Review:      6/21/2017  1:02 PM    Recent Labs      06/19/17 0438 06/19/17 2329 06/21/17 0336   SODIUM  138  135  140   POTASSIUM  3.4*  3.4*  3.6   CHLORIDE  109  109  108   CO2  23  23  25   BUN  6*  11  7*   CREATININE  0.57  0.70  0.63   MAGNESIUM  2.0  1.7  1.7   PHOSPHORUS  2.2*  2.7  3.1   CALCIUM  7.1*  7.1*  7.6*       Recent Labs      06/19/17 0438 06/19/17 2329 06/21/17   0336   ALTSGPT   --   32  31   ASTSGOT   --   42  34   ALKPHOSPHAT   --   29*  26*   TBILIRUBIN   --   0.2  0.3   GLUCOSE  94  138*  106*       Recent Labs      06/19/17 0438 06/19/17 2329 06/21/17   0336   RBC  4.31*  4.16*  4.39*   HEMOGLOBIN  12.9*  12.5*  13.1*   HEMATOCRIT  38.6*  37.3*  39.1*   PLATELETCT  200  211  234       Recent Labs      06/19/17 0438 06/19/17 2329 06/21/17   0336   WBC  10.4  12.2*  11.6*   NEUTSPOLYS  73.60*  77.60*  71.10   LYMPHOCYTES  11.50*  10.70*  14.00*   MONOCYTES  10.10  8.50  9.60   EOSINOPHILS  3.10  1.80  3.40   BASOPHILS  0.80  0.70  0.60   ASTSGOT   --   42  34   ALTSGPT   --   32  31   ALKPHOSPHAT   --   29*  26*   TBILIRUBIN   --   0.2  0.3           Assessment/Plan     C. difficile diarrhea  Assessment & Plan  severe C.diff/NAP-1 strain   - Several day  history of diarrhea in setting of recent abx use  - Leukocytosis on admission trended down then increased again 6/20, now trending back down.  - Blood Cx drawn on admission: No growth at 5 days.  - KUB on admission: mildly dilated air-filled loops of small bowel, ileus vs. Intermittent or partial obstruction.   - Repeat KUB: Diffuse air-filled distention of small bowel, appearance suggests ileus or enteritis, evolving obstructive changes not excluded.  - CT Abd: Small bilateral pleural effusions with atelectasis versus consolidation. Changes compatible with severe colitis, correlating with history.  - Surgery recs: patient is now stable, continue medical management, will sign off  - ID recs: Dc'd IV metronidazole, continue PO vanco for total of 14 days, will sign off   - Patient has missed two doses of PO vanco due to agitation and refusal, DPOA made aware and agreed to insert a Cortrak if patient continues to refuse. Patient has not refused any meds in past 48h.  - Still having loose stools. Abdominal exam benign, clinically stable but condition remains guarded as this is a severe strain.   - 6/19 CXR: Worsening L lung base consolidation, hazy opacities in R lower lung, small pleural effusion is new or increased.  - Restarted IV metronidazole after fever spike and CXR. This is a presumed aspiration pneumonia, leukocytosis is improving since restarting flagyl, afebrile since.   Plan:   -Continue PO Vancomycin and IV metronidazole. Will continue metronidazole for total 7 days.   -Continue IV fluids   -Nystatin/triamcinolone for perineal irritation   -Will need PT/OT eval per  request for discharge planning    Hypokalemia  Assessment & Plan  -Likely 2/2 diarrhea  -Will continue with repletion, KCl 40meq qd    Hypomagnesemia  Assessment & Plan  -1.7 today. Will replete 2mg IV Mag this morning, re-check on morning labs.    Late onset Alzheimer's disease with behavioral disturbance  Assessment & Plan  -Home meds:  seroquel 25mg bid, ativan 1mg PRN, depakote 125mg qid  -Confused and altered on admission, was slowly improving but patient is intermittently aggressive, agitated, uncooperative. Has required Haldol PRNs for acute agitation.   Plan:   -continue depakote home dose, seroquel 25mg bid, will also add PRN seroquel, can try using this first before haldol   -DC ativan, use Haldol PRN for agitation   -Will update DPOAs on importance of re-orientation of patient with familiar faces. Counseled caregiver at bedside to keep blinds open for natural sunlight during daytime.    Anemia  Assessment & Plan  -12.3 on admission, stable since.  -May be 2/2 iron deficiency vs B12/folate deficiency vs ACD. May also be 2/2 depakote side effect.  -Can pursue these causes after acute stabilization of C diff colitis. Will continue fluids and multivitamins.

## 2017-06-21 NOTE — PROGRESS NOTES
Pt is in bed, restraints in place, group home aide at bedside, no signs of distress, denies needs.

## 2017-06-21 NOTE — PROGRESS NOTES
MD informed to please renew restraint order for the pt that expires in 4 hours, pt is still appropriate for three point restraints.

## 2017-06-21 NOTE — PROGRESS NOTES
Bedside report completed.  Assumed pt care.  Pt AAO x1, resting in bed comfortably with no signs of labored breathing.  Pt tele monitor in place, cardiac rhythm being monitored.  Pt call light within reach, bed in low position, non skid socks in place.  Pt denies any pain or other distress at this time. Caregiver from home facility at bedside.

## 2017-06-21 NOTE — PROGRESS NOTES
Patient resting comfortably in bed with no signs of pain or other distress at this time.  Caregiver at bedside.

## 2017-06-21 NOTE — PROGRESS NOTES
Pt appears calm at this time in bed. Confused but not agitated at this time. Condom cath will not remain in place on the pt, attempted to re apply it several times today to no avail. Will attempt to keep the pt as dry as realistically possible with incontinence issues.

## 2017-06-21 NOTE — CARE PLAN
Problem: Communication  Goal: The ability to communicate needs accurately and effectively will improve  Intervention: Reorient patient to environment as needed    06/20/17 3772   OTHER   Oriented to: All of the Following : Location of Bathroom, Visiting Policy, Unit Routine, Call Light and Bedside Controls, Bedside Rail Policy, Smoking Policy, Rights and Responsibilities, Bedside Report, and Patient Education Notebook     RN attempted to reorient patient.  Patient is not aware of his location.        Problem: Skin Integrity  Goal: Risk for impaired skin integrity will decrease  Intervention: Assess risk factors for impaired skin integrity and/or pressure ulcers  Patient is incontinent.  RN applied barrier paste as necessary.

## 2017-06-21 NOTE — PROGRESS NOTES
Pt medicated with PRN meds per MAR, also changed out bed alarm as it was alarming without the pt moving. IV infusing, pt private sitter at bedside.

## 2017-06-21 NOTE — PROGRESS NOTES
Called traction to inform them that the pt has an order for bladder scan. Traction team stated to put in the order under traction instead of nursing-so the order was placed to show that. Traction callback number [id number].

## 2017-06-21 NOTE — PROGRESS NOTES
Bedside report received from RN overnight-pt is in 3 point restraints with group home aide at bedside-(Radha) the pt does not seem agitated at this time, is slightly restless but seems cooperative. MD rounding on the pt from [employer], updated on overnight events.

## 2017-06-21 NOTE — PROGRESS NOTES
Attempted to change the pt to remove stool from sacrum-however the pt got agitated and was resisting to be turned, yelling at staff, angry, uncooperative, not able to perform ADL's on pt due to this reason, will medicate PRN so we are able to care for the pt in a safe manner.

## 2017-06-21 NOTE — PROGRESS NOTES
Pts guardian left the bedside-pt getting increasingly agitated, not cooperating with staff, pulling at lines and blankets. Still in three point restraints. Will medicate PRN for agitation

## 2017-06-22 LAB
ANION GAP SERPL CALC-SCNC: 6 MMOL/L (ref 0–11.9)
BASOPHILS # BLD AUTO: 0.6 % (ref 0–1.8)
BASOPHILS # BLD: 0.05 K/UL (ref 0–0.12)
BUN SERPL-MCNC: 11 MG/DL (ref 8–22)
CALCIUM SERPL-MCNC: 7.6 MG/DL (ref 8.5–10.5)
CHLORIDE SERPL-SCNC: 109 MMOL/L (ref 96–112)
CO2 SERPL-SCNC: 23 MMOL/L (ref 20–33)
CREAT SERPL-MCNC: 0.68 MG/DL (ref 0.5–1.4)
EOSINOPHIL # BLD AUTO: 0.51 K/UL (ref 0–0.51)
EOSINOPHIL NFR BLD: 6.4 % (ref 0–6.9)
ERYTHROCYTE [DISTWIDTH] IN BLOOD BY AUTOMATED COUNT: 48.1 FL (ref 35.9–50)
GFR SERPL CREATININE-BSD FRML MDRD: >60 ML/MIN/1.73 M 2
GLUCOSE SERPL-MCNC: 95 MG/DL (ref 65–99)
HCT VFR BLD AUTO: 37.1 % (ref 42–52)
HGB BLD-MCNC: 12.5 G/DL (ref 14–18)
IMM GRANULOCYTES # BLD AUTO: 0.11 K/UL (ref 0–0.11)
IMM GRANULOCYTES NFR BLD AUTO: 1.4 % (ref 0–0.9)
LYMPHOCYTES # BLD AUTO: 1.35 K/UL (ref 1–4.8)
LYMPHOCYTES NFR BLD: 16.9 % (ref 22–41)
MAGNESIUM SERPL-MCNC: 1.9 MG/DL (ref 1.5–2.5)
MCH RBC QN AUTO: 29.9 PG (ref 27–33)
MCHC RBC AUTO-ENTMCNC: 33.7 G/DL (ref 33.7–35.3)
MCV RBC AUTO: 88.8 FL (ref 81.4–97.8)
MONOCYTES # BLD AUTO: 0.93 K/UL (ref 0–0.85)
MONOCYTES NFR BLD AUTO: 11.6 % (ref 0–13.4)
NEUTROPHILS # BLD AUTO: 5.06 K/UL (ref 1.82–7.42)
NEUTROPHILS NFR BLD: 63.1 % (ref 44–72)
NRBC # BLD AUTO: 0 K/UL
NRBC BLD AUTO-RTO: 0 /100 WBC
PHOSPHATE SERPL-MCNC: 3.7 MG/DL (ref 2.5–4.5)
PLATELET # BLD AUTO: 259 K/UL (ref 164–446)
PMV BLD AUTO: 9.7 FL (ref 9–12.9)
POTASSIUM SERPL-SCNC: 3.8 MMOL/L (ref 3.6–5.5)
RBC # BLD AUTO: 4.18 M/UL (ref 4.7–6.1)
SODIUM SERPL-SCNC: 138 MMOL/L (ref 135–145)
WBC # BLD AUTO: 8 K/UL (ref 4.8–10.8)

## 2017-06-22 PROCEDURE — 700111 HCHG RX REV CODE 636 W/ 250 OVERRIDE (IP): Performed by: INTERNAL MEDICINE

## 2017-06-22 PROCEDURE — 84100 ASSAY OF PHOSPHORUS: CPT

## 2017-06-22 PROCEDURE — 80048 BASIC METABOLIC PNL TOTAL CA: CPT

## 2017-06-22 PROCEDURE — 700101 HCHG RX REV CODE 250: Performed by: INTERNAL MEDICINE

## 2017-06-22 PROCEDURE — 99232 SBSQ HOSP IP/OBS MODERATE 35: CPT | Performed by: INTERNAL MEDICINE

## 2017-06-22 PROCEDURE — 85025 COMPLETE CBC W/AUTO DIFF WBC: CPT

## 2017-06-22 PROCEDURE — 304222 HCHG STAT ISOLATION DAILY CHARGE

## 2017-06-22 PROCEDURE — 700102 HCHG RX REV CODE 250 W/ 637 OVERRIDE(OP): Performed by: STUDENT IN AN ORGANIZED HEALTH CARE EDUCATION/TRAINING PROGRAM

## 2017-06-22 PROCEDURE — A9270 NON-COVERED ITEM OR SERVICE: HCPCS | Performed by: STUDENT IN AN ORGANIZED HEALTH CARE EDUCATION/TRAINING PROGRAM

## 2017-06-22 PROCEDURE — A9270 NON-COVERED ITEM OR SERVICE: HCPCS | Performed by: INTERNAL MEDICINE

## 2017-06-22 PROCEDURE — 700102 HCHG RX REV CODE 250 W/ 637 OVERRIDE(OP): Performed by: HOSPITALIST

## 2017-06-22 PROCEDURE — 36415 COLL VENOUS BLD VENIPUNCTURE: CPT

## 2017-06-22 PROCEDURE — A9270 NON-COVERED ITEM OR SERVICE: HCPCS | Performed by: HOSPITALIST

## 2017-06-22 PROCEDURE — 700105 HCHG RX REV CODE 258: Performed by: INTERNAL MEDICINE

## 2017-06-22 PROCEDURE — 700102 HCHG RX REV CODE 250 W/ 637 OVERRIDE(OP): Performed by: INTERNAL MEDICINE

## 2017-06-22 PROCEDURE — 770021 HCHG ROOM/CARE - ISO PRIVATE

## 2017-06-22 PROCEDURE — 83735 ASSAY OF MAGNESIUM: CPT

## 2017-06-22 RX ADMIN — METRONIDAZOLE 500 MG: 500 INJECTION, SOLUTION INTRAVENOUS at 20:21

## 2017-06-22 RX ADMIN — DIVALPROEX SODIUM 125 MG: 125 CAPSULE, COATED PELLETS ORAL at 18:05

## 2017-06-22 RX ADMIN — TAMSULOSIN HYDROCHLORIDE 0.8 MG: 0.4 CAPSULE ORAL at 20:20

## 2017-06-22 RX ADMIN — DIVALPROEX SODIUM 125 MG: 125 CAPSULE, COATED PELLETS ORAL at 12:21

## 2017-06-22 RX ADMIN — DIVALPROEX SODIUM 125 MG: 125 CAPSULE, COATED PELLETS ORAL at 23:42

## 2017-06-22 RX ADMIN — VANCOMYCIN HYDROCHLORIDE 125 MG: 10 INJECTION, POWDER, LYOPHILIZED, FOR SOLUTION INTRAVENOUS at 05:52

## 2017-06-22 RX ADMIN — METRONIDAZOLE 500 MG: 500 INJECTION, SOLUTION INTRAVENOUS at 13:59

## 2017-06-22 RX ADMIN — VANCOMYCIN HYDROCHLORIDE 125 MG: 10 INJECTION, POWDER, LYOPHILIZED, FOR SOLUTION INTRAVENOUS at 18:05

## 2017-06-22 RX ADMIN — QUETIAPINE FUMARATE 50 MG: 25 TABLET ORAL at 20:20

## 2017-06-22 RX ADMIN — POTASSIUM CHLORIDE 40 MEQ: 1.5 POWDER, FOR SOLUTION ORAL at 08:39

## 2017-06-22 RX ADMIN — VANCOMYCIN HYDROCHLORIDE 125 MG: 10 INJECTION, POWDER, LYOPHILIZED, FOR SOLUTION INTRAVENOUS at 23:42

## 2017-06-22 RX ADMIN — VANCOMYCIN HYDROCHLORIDE 125 MG: 10 INJECTION, POWDER, LYOPHILIZED, FOR SOLUTION INTRAVENOUS at 12:21

## 2017-06-22 RX ADMIN — DIVALPROEX SODIUM 125 MG: 125 CAPSULE, COATED PELLETS ORAL at 05:52

## 2017-06-22 RX ADMIN — SODIUM CHLORIDE: 9 INJECTION, SOLUTION INTRAVENOUS at 01:37

## 2017-06-22 RX ADMIN — METRONIDAZOLE 500 MG: 500 INJECTION, SOLUTION INTRAVENOUS at 05:51

## 2017-06-22 RX ADMIN — VANCOMYCIN HYDROCHLORIDE 125 MG: 10 INJECTION, POWDER, LYOPHILIZED, FOR SOLUTION INTRAVENOUS at 01:33

## 2017-06-22 RX ADMIN — HALOPERIDOL LACTATE 5 MG: 5 INJECTION, SOLUTION INTRAMUSCULAR at 18:01

## 2017-06-22 RX ADMIN — THERA TABS 1 TABLET: TAB at 08:39

## 2017-06-22 RX ADMIN — QUETIAPINE FUMARATE 50 MG: 25 TABLET ORAL at 08:39

## 2017-06-22 ASSESSMENT — PAIN SCALES - GENERAL
PAINLEVEL_OUTOF10: 0

## 2017-06-22 ASSESSMENT — LIFESTYLE VARIABLES: DO YOU DRINK ALCOHOL: NO

## 2017-06-22 NOTE — PROGRESS NOTES
Pt resting comfortably in bed, sitter at bedside. No signs of distress, pt took meds without conflict. Also offered beverage and the pt drank without complication. Three point restraints remain in place. VS stable. Bed alarm on.

## 2017-06-22 NOTE — PROGRESS NOTES
MD Benito Joyner paged about pt not being on tele monitor. Per MD, he is going to call primary team to ask for further clarifications and will page RN back.

## 2017-06-22 NOTE — CARE PLAN
Problem: Safety  Goal: Will remain free from injury  Intervention: Provide assistance with mobility  Pt is assisted with mobility by two staff members for safety      Problem: Venous Thromboembolism (VTW)/Deep Vein Thrombosis (DVT) Prevention:  Goal: Patient will participate in Venous Thrombosis (VTE)/Deep Vein Thrombosis (DVT)Prevention Measures  Intervention: Assess and monitor for anticoagulation complications  Assess for signs of bleeding every shift-pt exhibits no current signs of complications

## 2017-06-22 NOTE — PROGRESS NOTES
Bedside report received, pt resting in bed with eyes closed, three point restraints intact, no signs of distress, sitter at bedside, VS stable.

## 2017-06-22 NOTE — PROGRESS NOTES
MD aware of pts current condition/agitation and refusing meds and care. MD also stated we can d/c tele if it is causing agitation. Will remove monitor.

## 2017-06-22 NOTE — CARE PLAN
Problem: Nutritional:  Goal: Achieve adequate nutritional intake  Patient will consume 50% of meals   Outcome: MET Date Met:  06/22/17  Intervention: Monitor PO intake, weights, and laboratory values  Pt is consistently consuming >50% of meals with most meals % consumed.

## 2017-06-22 NOTE — PROGRESS NOTES
repaged MD to ask about tele monitor. Pt is also refusing meds and VS at this time and increasingly agitated and not allowing staff to clean bottom or change sheets. Will keep trying to care for the pt-brother visiting at bedside decided to leave as he is not able to redirect the pt and is not helping with the pts agitation. Will let MD know about this behavior from the pt.
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
## 2017-06-22 NOTE — PROGRESS NOTES
[facility] – [place], [place] Internal Medicine Interval Note    Name Neymar Anderson     1945   Age/Sex 71 y.o. male   MRN [id number]   Code Status Full Code     After 5PM or if no immediate response to page, please call for cross-coverage  Attending/Team: Dr. Ibrahim/Evangelista Call [phone] to page   1st Call - Day Intern (R1):   Dr. Newell 2nd Call - Day Sr. Resident (R2/R3):   Dr. Minaya         Chief complaint/ reason for interval visit (Primary Diagnosis)   Diarrhea, fever    Interval Problem Daily Status Update    Patient doing well, still waxing/waning agitations, continues to refuse some doses of PO vanc, more alert this AM, seroquel increased yesterday, 1 formed BM overnight, frequent urination with nursing checks q1hr, D/C'ed IV fluids, PT consult pending.    ROS  Unable to perform ROS: dementia     Consultants/Specialty  Dr. Mejia/ID - Signed off  Dr. Ross/General Surgery - Signed off    Disposition  Stable, nearing baseline    Core Measures  EKG reviewed, Labs reviewed, Medications reviewed and Radiology images reviewed  Holland catheter: No Holland  DVT prophylaxis - mechanical: SCDs  Ulcer prophylaxis: No  Antibiotics: Treating active infection/contamination beyond 24 hours perioperative coverage      Physical Exam       Filed Vitals:    17 0400 17 0800 17 0839 17 1108   BP: 121/95 118/72  108/71   Pulse: 96 93  97   Temp: 36.7 °C (98.1 °F) 36.4 °C (97.5 °F)  36.6 °C (97.8 °F)   Resp:    Height:       Weight:       SpO2: 92% 94% 96% 97%     Body mass index is 24.07 kg/(m^2). Weight: 71.8 kg (158 lb 4.6 oz)  Oxygen Therapy:  Pulse Oximetry: 97 %, O2 (LPM): 2, O2 Delivery: Nasal Cannula    Physical Exam  Constitutional:  Alert, disoriented, confused, speaks nonsensically  Head: Normocephalic and atraumatic.   Eyes: EOM are normal.   Neck: Normal range of motion.   Cardiovascular: Normal rate, regular rhythm, normal heart sounds and intact distal pulses.      No murmur heard.  Pulmonary/Chest: Effort normal and breath sounds normal. No respiratory distress. He has no wheezes. He has no rales.   Abdominal: Soft. Bowel sounds are normal. He exhibits no distension and no mass. There is no rebound and no guarding.   Musculoskeletal: Normal range of motion. He exhibits no edema.   Neurological: Not oriented   Skin: Skin is warm and dry. No erythema.    Lab Data Review:      6/22/2017  12:21 PM    Recent Labs      06/19/17 2329 06/21/17 0336  06/22/17   0259   SODIUM  135  140  138   POTASSIUM  3.4*  3.6  3.8   CHLORIDE  109  108  109   CO2  23  25  23   BUN  11  7*  11   CREATININE  0.70  0.63  0.68   MAGNESIUM  1.7  1.7  1.9   PHOSPHORUS  2.7  3.1  3.7   CALCIUM  7.1*  7.6*  7.6*       Recent Labs      06/19/17 2329 06/21/17 0336 06/22/17   0259   ALTSGPT  32  31   --    ASTSGOT  42  34   --    ALKPHOSPHAT  29*  26*   --    TBILIRUBIN  0.2  0.3   --    GLUCOSE  138*  106*  95       Recent Labs      06/19/17 2329 06/21/17 0336  06/22/17   0259   RBC  4.16*  4.39*  4.18*   HEMOGLOBIN  12.5*  13.1*  12.5*   HEMATOCRIT  37.3*  39.1*  37.1*   PLATELETCT  211  234  259       Recent Labs      06/19/17 2329 06/21/17 0336 06/22/17   0259   WBC  12.2*  11.6*  8.0   NEUTSPOLYS  77.60*  71.10  63.10   LYMPHOCYTES  10.70*  14.00*  16.90*   MONOCYTES  8.50  9.60  11.60   EOSINOPHILS  1.80  3.40  6.40   BASOPHILS  0.70  0.60  0.60   ASTSGOT  42  34   --    ALTSGPT  32  31   --    ALKPHOSPHAT  29*  26*   --    TBILIRUBIN  0.2  0.3   --            Assessment/Plan     1) C. difficile diarrhea - Resolving  - Severe C.diff/NAP-1 strain positive  - Several day history of diarrhea in setting of recent abx use  - Leukocytosis on admission, resolved  - Blood Cx: No growth at 5 days.  - CT Abd 6/16: Small bilateral pleural effusions with atelectasis versus consolidation. Changes compatible with severe colitis.  - KUB 6/17 on admission: mildly dilated air-filled loops of  small bowel, ileus vs. Intermittent or partial obstruction.   - KUB 6/19: Diffuse air-filled distention of small bowel, appearance suggests ileus or enteritis  - Surgery recs: patient is now stable, continue medical management, will sign off  - ID recs: Dc'd IV metronidazole, continue PO vanco for total of 14 days, will sign off   - Patient has missed several doses of PO vanco due to agitation and refusal, DPOA made aware and agreed to insert a Cortrak if patient continues to refuse. Still refusing a dose every now and again.  - Stools beginning to form and decrease in frequency. Abdominal exam benign, clinically stable but condition remains guarded as this is a severe strain.   - 6/19 CXR: Worsening L lung base consolidation, hazy opacities in R lower lung, small pleural effusion is new or increased. Will D/C IF fluids for now.  - Restarted IV metronidazole after fever spike on 6/19.   Plan:   -Continue PO Vancomycin day 8/14 and IV metronidazole   -D/C IV fluids   -Nystatin/triamcinolone for perineal irritation   -Contact DPOA Jaquan    -Will need PT/OT eval per  request for discharge planning    2) Hypokalemia - Resolved  -Likely 2/2 diarrhea  -Follow  -Replete PRN    3 Hypomagnesemia - Resolved  -Replete PRN    4) Late onset Alzheimer's disease with behavioral disturbance Nearing baseline  -Home meds: seroquel 50mg bid (increased from home does of 25mg BID), depakote 125mg qid  -Confused and altered on admission, was slowly improving but patient is intermittently aggressive, agitated, uncooperative. Has required Haldol PRNs for acute agitation.   Plan:   -continue depakote home dose, seroquel 50mg bid, will also add PRN seroquel, can try using this first before haldol   -DC ativan, use Haldol PRN for agitation   -Will update DPOAs on importance of re-orientation of patient with familiar faces. Counseled caregiver at bedside to keep blinds open for natural sunlight during daytime.    5) Anemia  -12.3 on  admission, stable since.  -May be 2/2 iron deficiency vs B12/folate deficiency vs ACD. May also be 2/2 depakote side effect.  -Can pursue these causes after acute stabilization of C diff colitis. Will continue fluids and multivitamins.

## 2017-06-22 NOTE — PROGRESS NOTES
Paged by RN about increasing agitation, aggressiveness.   Examined patient at bedside  Vitals stable, benign abdomen. Labs/chart reviewed.  Inappropriate response to simple questions  Confused and paranoid. Verbally abusive  Refusing to take po meds despite best attempts at education why he needs these meds.  Looks like Delirium in the setting on advanced Dementia  Apparently was more pleasant earlier in the day and took his meds, but now is mean  Will try slightly increase his Seroquel and hope for positive effect on his mental status  Will consider cortrack if remains adamant and continues to refuse his po meds.  Informed inform day resident about ongoing events and medication changes.

## 2017-06-22 NOTE — CARE PLAN
Problem: Bowel/Gastric:  Goal: Normal bowel function is maintained or improved  Outcome: PROGRESSING SLOWER THAN EXPECTED  Pt has frequent loose bm's. Pt provided medication per mar to help resolve underlying cause of diarrhea.     Problem: Skin Integrity  Goal: Risk for impaired skin integrity will decrease  Outcome: PROGRESSING AS EXPECTED  Pts skin assessed for breakdown. Pt q2 turned and changed with any episodes of incontinence. Barrier cream applied as needed. Full length waffle cushion in place.

## 2017-06-22 NOTE — PROGRESS NOTES
Placed page to MD to see if we can d/c tele as the pt is just becoming agitated pulling at the wires and taking the monitor off. Could decrease agitation if we d/c this. Waiting on call back

## 2017-06-22 NOTE — PROGRESS NOTES
The pt was agreeable to take meds at this time. Will ask CNA to attempt to change the pt and get the pt cleaned up as he seems more calm and cooperative at this time. Will not give second dose of haldol due to the pt doesn't seem like he needs it at this time.

## 2017-06-22 NOTE — PROGRESS NOTES
Pt resting comfortably in bed. When woken up to take medications pt became agitated and would spit out medication when given.

## 2017-06-22 NOTE — PROGRESS NOTES
Bedside report received from day RN. Assumed pt care. Pt resting comfortably in bed. No signs of distress. Personal care giver at bedside. BUE wrist and RLE ankle soft restraints in use and assessed. Bed alarm on. Bed locked and in lowest position. Call light within reach. Hourly rounding in place.

## 2017-06-23 PROBLEM — E87.6 HYPOKALEMIA: Status: RESOLVED | Noted: 2017-06-15 | Resolved: 2017-06-23

## 2017-06-23 PROBLEM — E83.42 HYPOMAGNESEMIA: Status: RESOLVED | Noted: 2017-06-18 | Resolved: 2017-06-23

## 2017-06-23 LAB
ANION GAP SERPL CALC-SCNC: 4 MMOL/L (ref 0–11.9)
BUN SERPL-MCNC: 14 MG/DL (ref 8–22)
CALCIUM SERPL-MCNC: 8 MG/DL (ref 8.5–10.5)
CHLORIDE SERPL-SCNC: 107 MMOL/L (ref 96–112)
CO2 SERPL-SCNC: 27 MMOL/L (ref 20–33)
CREAT SERPL-MCNC: 0.67 MG/DL (ref 0.5–1.4)
GFR SERPL CREATININE-BSD FRML MDRD: >60 ML/MIN/1.73 M 2
GLUCOSE SERPL-MCNC: 111 MG/DL (ref 65–99)
POTASSIUM SERPL-SCNC: 4.1 MMOL/L (ref 3.6–5.5)
SODIUM SERPL-SCNC: 138 MMOL/L (ref 135–145)

## 2017-06-23 PROCEDURE — A9270 NON-COVERED ITEM OR SERVICE: HCPCS | Performed by: STUDENT IN AN ORGANIZED HEALTH CARE EDUCATION/TRAINING PROGRAM

## 2017-06-23 PROCEDURE — 99232 SBSQ HOSP IP/OBS MODERATE 35: CPT | Performed by: INTERNAL MEDICINE

## 2017-06-23 PROCEDURE — 97162 PT EVAL MOD COMPLEX 30 MIN: CPT

## 2017-06-23 PROCEDURE — 80048 BASIC METABOLIC PNL TOTAL CA: CPT

## 2017-06-23 PROCEDURE — 700102 HCHG RX REV CODE 250 W/ 637 OVERRIDE(OP): Performed by: STUDENT IN AN ORGANIZED HEALTH CARE EDUCATION/TRAINING PROGRAM

## 2017-06-23 PROCEDURE — 700102 HCHG RX REV CODE 250 W/ 637 OVERRIDE(OP): Performed by: INTERNAL MEDICINE

## 2017-06-23 PROCEDURE — G8978 MOBILITY CURRENT STATUS: HCPCS | Mod: CK

## 2017-06-23 PROCEDURE — A9270 NON-COVERED ITEM OR SERVICE: HCPCS | Performed by: INTERNAL MEDICINE

## 2017-06-23 PROCEDURE — 36415 COLL VENOUS BLD VENIPUNCTURE: CPT

## 2017-06-23 PROCEDURE — 700102 HCHG RX REV CODE 250 W/ 637 OVERRIDE(OP): Performed by: HOSPITALIST

## 2017-06-23 PROCEDURE — 700105 HCHG RX REV CODE 258: Performed by: INTERNAL MEDICINE

## 2017-06-23 PROCEDURE — A9270 NON-COVERED ITEM OR SERVICE: HCPCS | Performed by: HOSPITALIST

## 2017-06-23 PROCEDURE — 770021 HCHG ROOM/CARE - ISO PRIVATE

## 2017-06-23 PROCEDURE — G8979 MOBILITY GOAL STATUS: HCPCS | Mod: CJ

## 2017-06-23 PROCEDURE — 304222 HCHG STAT ISOLATION DAILY CHARGE

## 2017-06-23 PROCEDURE — 700101 HCHG RX REV CODE 250: Performed by: INTERNAL MEDICINE

## 2017-06-23 PROCEDURE — 92526 ORAL FUNCTION THERAPY: CPT

## 2017-06-23 RX ORDER — SODIUM CHLORIDE 9 MG/ML
INJECTION, SOLUTION INTRAVENOUS CONTINUOUS
Status: DISCONTINUED | OUTPATIENT
Start: 2017-06-23 | End: 2017-06-25

## 2017-06-23 RX ORDER — SODIUM CHLORIDE 9 MG/ML
500 INJECTION, SOLUTION INTRAVENOUS ONCE
Status: COMPLETED | OUTPATIENT
Start: 2017-06-23 | End: 2017-06-23

## 2017-06-23 RX ADMIN — SODIUM CHLORIDE: 9 INJECTION, SOLUTION INTRAVENOUS at 10:00

## 2017-06-23 RX ADMIN — METRONIDAZOLE 500 MG: 500 INJECTION, SOLUTION INTRAVENOUS at 13:49

## 2017-06-23 RX ADMIN — DIVALPROEX SODIUM 125 MG: 125 CAPSULE, COATED PELLETS ORAL at 04:32

## 2017-06-23 RX ADMIN — VANCOMYCIN HYDROCHLORIDE 125 MG: 10 INJECTION, POWDER, LYOPHILIZED, FOR SOLUTION INTRAVENOUS at 04:32

## 2017-06-23 RX ADMIN — SODIUM CHLORIDE 500 ML: 9 INJECTION, SOLUTION INTRAVENOUS at 09:37

## 2017-06-23 RX ADMIN — DIVALPROEX SODIUM 125 MG: 125 CAPSULE, COATED PELLETS ORAL at 11:55

## 2017-06-23 RX ADMIN — QUETIAPINE FUMARATE 50 MG: 25 TABLET ORAL at 20:58

## 2017-06-23 RX ADMIN — QUETIAPINE FUMARATE 50 MG: 25 TABLET ORAL at 08:03

## 2017-06-23 RX ADMIN — VANCOMYCIN HYDROCHLORIDE 125 MG: 10 INJECTION, POWDER, LYOPHILIZED, FOR SOLUTION INTRAVENOUS at 11:55

## 2017-06-23 RX ADMIN — TAMSULOSIN HYDROCHLORIDE 0.8 MG: 0.4 CAPSULE ORAL at 20:58

## 2017-06-23 RX ADMIN — DIVALPROEX SODIUM 125 MG: 125 CAPSULE, COATED PELLETS ORAL at 18:24

## 2017-06-23 RX ADMIN — METRONIDAZOLE 500 MG: 500 INJECTION, SOLUTION INTRAVENOUS at 05:36

## 2017-06-23 RX ADMIN — SODIUM CHLORIDE: 9 INJECTION, SOLUTION INTRAVENOUS at 14:09

## 2017-06-23 RX ADMIN — METRONIDAZOLE 500 MG: 500 INJECTION, SOLUTION INTRAVENOUS at 21:00

## 2017-06-23 RX ADMIN — VANCOMYCIN HYDROCHLORIDE 125 MG: 10 INJECTION, POWDER, LYOPHILIZED, FOR SOLUTION INTRAVENOUS at 18:23

## 2017-06-23 RX ADMIN — THERA TABS 1 TABLET: TAB at 08:02

## 2017-06-23 ASSESSMENT — GAIT ASSESSMENTS
DISTANCE (FEET): 1
ASSISTIVE DEVICE: HAND HELD ASSIST
GAIT LEVEL OF ASSIST: MODERATE ASSIST

## 2017-06-23 ASSESSMENT — COGNITIVE AND FUNCTIONAL STATUS - GENERAL
TURNING FROM BACK TO SIDE WHILE IN FLAT BAD: A LOT
SUGGESTED CMS G CODE MODIFIER MOBILITY: CM
STANDING UP FROM CHAIR USING ARMS: TOTAL
MOVING TO AND FROM BED TO CHAIR: A LOT
MOBILITY SCORE: 9
WALKING IN HOSPITAL ROOM: TOTAL
CLIMB 3 TO 5 STEPS WITH RAILING: TOTAL
MOVING FROM LYING ON BACK TO SITTING ON SIDE OF FLAT BED: A LOT

## 2017-06-23 ASSESSMENT — LIFESTYLE VARIABLES: DO YOU DRINK ALCOHOL: NO

## 2017-06-23 ASSESSMENT — PAIN SCALES - GENERAL: PAINLEVEL_OUTOF10: 0

## 2017-06-23 NOTE — PROGRESS NOTES
Bedside report received, no signs of distress, group home sitter at bedside, pt waving, says he is feeling fine this morning, three point restraints in place, pt denies needs, VS stable.

## 2017-06-23 NOTE — PROGRESS NOTES
marisa leader in room, updated on pts condition and current status. All questions answered, denies needs.

## 2017-06-23 NOTE — THERAPY
"Speech Language Therapy dysphagia treatment completed.   Functional Status: Patient currently on Dys2/NTL diet and per RN, patient appears to be tolerating diet without difficultly. Patient with hired caregiver at bedside who reported that he ate approximately 50% of his breakfast tray this AM. Patient's geriatric manager, Elida, present at bedside at end of session. Patient awake, alert, and agreeable to tx session. Patient consumed PO trials of soft solids, pudding, and NTL via cup sip. Patient had minimal throat clear x1 out of 4 oz soft solids. Patient had no other overt s/sx of aspiration on any consistencies trialed. When given cup to take sips independently, patient was impulsive and took large gulps despite prompting to slow rate and amount. Patient still requires 1:1 feeding d/t same. Patient refused further trials and exercises this session. However, packet of dysphagia exercises and instructions on proper technique of each was provided to caregiver/manager at bedside. Both reported they will encourage patient to complete them several times during day. At this time, patient appears at the level to continue Dys2/NTL diet with 1:1 feeding. Crush meds in applesauce. RN aware. SLP is following.     Recommendations: Continue Dys2/NTL diet with strict 1:1 feeding. Crush meds in applesauce. Do not float.   Plan of Care: Will benefit from Speech Therapy 3 times per week  Post-Acute Therapy: Discharge to a transitional care facility for continued skilled therapy services.    See \"Rehab Therapy-Acute\" Patient Summary Report for complete documentation.     "

## 2017-06-23 NOTE — DISCHARGE PLANNING
Transitional Care Navigator:    Spoke with pt's guardian, Jh, to discuss d/c plan and therapy recommendation for SNF placement. Jh is agreeable to SNF placement and chooses [facility] and [facility] as choice. Choice form completed and faxed to [facility].

## 2017-06-23 NOTE — PROGRESS NOTES
Pt resting in bed at this time, even visible chest rise, no apparent signs of distress, bed alarm on, call light in place, bed in low and locked position. Special contact isolation for Cdiff, proper sign's in place.  Private sitter at bedside wearing appropriate contact gown and gloves.

## 2017-06-23 NOTE — THERAPY
"71 y/ o male adm for fever and diarrhea, tachypneic and hypotensive. Dx: PNA, cdiff,hx of dementia. Pt to benefit from acute PT to address bed mobility, transfers, gait ,balnce and strength.    Physical Therapy Evaluation completed.   Bed Mobility:  Supine to Sit: Moderate Assist  Transfers: Sit to Stand: Moderate Assist  Gait: Level Of Assist: Moderate Assist with HHA       Plan of Care: Will benefit from Physical Therapy 3 times per week  Discharge Recommendations: Equipment: Will Continue to Assess for Equipment Needs. Post-acute therapy Discharge to a transitional care facility for continued skilled therapy services.    See \"Rehab Therapy-Acute\" Patient Summary Report for complete documentation.     "

## 2017-06-23 NOTE — PROGRESS NOTES
Pt agitated and refusing meds and ADL's with stool and urine soaked sheets. Pt physically and verbally combative with attempts to provide meds and care

## 2017-06-23 NOTE — PROGRESS NOTES
Was able to clean the pt before ending shift, changed sheets, partial bed bath and gown change, barrier cream applied-pt was also agreeable to take meds. Pt cooperative and pleasant at this time-allowing bedside sitter to feed him dinner. Left pt safe with three point restraints, bed low, bed alarm in place.

## 2017-06-23 NOTE — DISCHARGE PLANNING
[facility] received a SNF choice form. Per the choice form the referral has been sent to [facility] and

## 2017-06-23 NOTE — PROGRESS NOTES
Pt incontinent of loose stool.  Changed linen and cleaned pt.  Repositioned pt.  3 point restraints in place, pt on waffle cushion.  Special contact precautions for Cdiff.

## 2017-06-23 NOTE — PROGRESS NOTES
[facility] – [place] Internal Medicine Interval Note    Name Neymar Anderson     1945   Age/Sex 71 y.o. male   MRN [id number]   Code Status Full Code     After 5PM or if no immediate response to page, please call for cross-coverage  Attending/Team: Dr. Ibrahim/Evangelista Call [phone] to page   1st Call - Day Intern (R1):   Dr. Newell 2nd Call - Day Sr. Resident (R2/R3):   Dr. Minaya         Chief complaint/ reason for interval visit (Primary Diagnosis)   Diarrhea, fever    Interval Problem Daily Status Update    4-5 loose mucous stools documented over the past 24 hours, patient awake this AM, calm with no new complaints, frequent incontinence to urine and stool, Iv fluids D/C'ed this AM, able to work with PT this AM with some malaise, became hypotensive while at rest with BP 90/56, given 500cc bolus with re-initiation of IV fluids, coughing when entering room, sitter at bedside said that he does not cough frequently.     ROS  Unable to perform ROS: dementia     Consultants/Specialty  Dr. Mejia/ID - Signed off  Dr. Ross/General Surgery - Signed off    Disposition  Inpatient, guarded    Core Measures  EKG reviewed, Labs reviewed, Medications reviewed and Radiology images reviewed  Holland catheter: No Holland  DVT prophylaxis - mechanical: SCDs  Ulcer prophylaxis: No  Antibiotics: Treating active infection/contamination beyond 24 hours perioperative coverage      Physical Exam       Filed Vitals:    17 2000 17 0000 17 0400 17 0908   BP:  120/72 121/74 90/56   Pulse:  101 103 94   Temp:  36.7 °C (98.1 °F) 36.8 °C (98.2 °F) 36.7 °C (98.1 °F)   Resp:  17 18 18   Height:       Weight: 71.3 kg (157 lb 3 oz)      SpO2:  95% 96% 93%     Body mass index is 23.91 kg/(m^2). Weight: 71.3 kg (157 lb 3 oz)  Oxygen Therapy:  Pulse Oximetry: 93 %, O2 (LPM): 0, O2 Delivery: None (Room Air)    Physical Exam  Constitutional:  Alert, disoriented, confused, tearful this AM  Head:  Normocephalic and atraumatic.    Eyes: EOM are normal.      Cardiovascular: Normal rate, regular rhythm, normal heart sounds. No murmur heard.  Pulmonary/Chest: Effort normal and breath sounds normal. No respiratory distress. He has no wheezes. He has no rales.    Abdominal: Soft. Bowel sounds are normal. He exhibits no distension and no mass. There is no rebound and no guarding.   Musculoskeletal: Normal range of motion. He exhibits no edema.   Neurological: Not oriented    Skin: Skin is warm. No erythema.    Lab Data Review:      6/23/2017  10:10 AM    Recent Labs      06/21/17   0336  06/22/17 0259 06/23/17 0312   SODIUM  140  138  138   POTASSIUM  3.6  3.8  4.1   CHLORIDE  108  109  107   CO2  25  23  27   BUN  7*  11  14   CREATININE  0.63  0.68  0.67   MAGNESIUM  1.7  1.9   --    PHOSPHORUS  3.1  3.7   --    CALCIUM  7.6*  7.6*  8.0*       Recent Labs      06/21/17 0336 06/22/17 0259 06/23/17 0312   ALTSGPT  31   --    --    ASTSGOT  34   --    --    ALKPHOSPHAT  26*   --    --    TBILIRUBIN  0.3   --    --    GLUCOSE  106*  95  111*       Recent Labs      06/21/17 0336 06/22/17 0259   RBC  4.39*  4.18*   HEMOGLOBIN  13.1*  12.5*   HEMATOCRIT  39.1*  37.1*   PLATELETCT  234  259       Recent Labs      06/21/17 0336 06/22/17 0259   WBC  11.6*  8.0   NEUTSPOLYS  71.10  63.10   LYMPHOCYTES  14.00*  16.90*   MONOCYTES  9.60  11.60   EOSINOPHILS  3.40  6.40   BASOPHILS  0.60  0.60   ASTSGOT  34   --    ALTSGPT  31   --    ALKPHOSPHAT  26*   --    TBILIRUBIN  0.3   --            Assessment/Plan     C. difficile diarrhea  Assessment & Plan  - Severe C.diff/NAP-1 strain positive  - Several day history of diarrhea with worsening mentation in setting of recent abx use  - Leukocytosis on admission, resolved  - Blood Cx: No growth at 5 days  - CT Abd 6/16: Small bilateral pleural effusions with atelectasis versus consolidation. Changes compatible with severe colitis.  - KUB 6/17 on admission: mildly  dilated air-filled loops of small bowel, ileus vs. Intermittent or partial obstruction.    - KUB 6/19: Diffuse air-filled distention of small bowel, appearance suggests ileus or enteritis  - Surgery recs: patient is now stable, continue medical management, will sign off  - ID recs: D/C IV metronidazole, continue PO vanco for total of 14 days, signed off    - Patient has missed several doses of PO vanco due to agitation and refusal, DPOA made aware and agreed to insert a Cortrak if patient continues to refuse. Still refusing a dose every now and again. If he refuses any more doses will place a cortrak (already ordered) as he cannot miss any more doses.  - Abdominal exam benign, clinically stable but condition remains guarded as this is a severe strain.    - Passed 5-6 loose mucous stools overnight  - Restarted IV metronidazole after fever spike on 6/19.              Plan:              -Continue PO Vancomycin day 9/14 and IV metronidazole              -Restart IV fluids as patient hypotensive this AM              -Nystatin/triamcinolone for perineal irritation              -Frequent contact with DPOA Jaquan and o/p care coordinatorElida                -Will need PT/OT eval per SW request for discharge planning    Late onset Alzheimer's disease with behavioral disturbance  Assessment & Plan  -Home meds: seroquel 25mg bid (Since increased to 50mg BID for agitaiton), depakote 125mg qid  -Confused and altered on admission, was slowly improving but patient is has continued intermittent aggression, agitation, uncooperative. Has required Haldol PRNs for acute agitation.              Plan:              -Continue depakote home dose, seroquel 50mg BID, will also add PRN seroquel, can try using this first before IV haldol              -DC ativan, use Haldol PRN for agitation              -Discussed with DPOAs on importance of re-orientation of patient with familiar faces. Counseled caregiver at bedside to keep blinds  open for natural sunlight during daytime.    Anemia  Assessment & Plan  12.3 on admission, stable since.  -May be 2/2 iron deficiency vs B12/folate deficiency vs ACD. May also be 2/2 depakote side effect.  -Can pursue these causes after acute stabilization of C diff colitis. Will continue fluids and multivitamins.

## 2017-06-23 NOTE — CARE PLAN
Problem: Safety  Goal: Will remain free from injury  Intervention: Provide assistance with mobility  Pt mobility assessed at beginning of shift, pt 2 or more assist, combative, non-compliant and frequently incontinent of stool.  Fall precautions in place, non-slip socks on, bed in lowest, locked position and call light is within reach, 3 point soft restraints in place.  Private sitter at bedside, educated sitter on importance of washing hands before leaving room and the need for gown and gloves.  Pt on special contact isolation for Cdiff.       Problem: Skin Integrity  Goal: Risk for impaired skin integrity will decrease  Intervention: Assess risk factors for impaired skin integrity and/or pressure ulcers  pt turns self side to side, moisture and barrier cream in use.  Floating heels.  Pt frequently incontinent of stool, requiring frequent linen changes and bed baths.

## 2017-06-23 NOTE — PROGRESS NOTES
Pt lying in bed, visitor and sitter at bedside, three point restraints in place, denies needs, VS stable, no signs of distress.

## 2017-06-23 NOTE — PROGRESS NOTES
Bedside report completed. Assumed pt care. Pt A&O 1 (self only), resting in bed comfortably with no signs of labored breathing on RA. Pt is medical. Pt call light within reach, bed in low position, upper bed rails up, non skid socks in place. Pt denies any pain or other distress at this time. Patient was updated on the plan of care for the night. All fall precautions in place, pt is in 3 point restraints due to confusion and combativeness towards staff. Private sitter at bedside, educated sitter regarding special contact isolation and importance of washing hands before leaving room. Pt had large loose BM and was refusing day staff to clean him. Pt allowed night CNA to clean him.  Will continue to monitor.

## 2017-06-23 NOTE — PROGRESS NOTES
Placed page to MD to notify them of the pts low BP this am. Pt is sleeping in the bed at the time the BP was taken.

## 2017-06-23 NOTE — PROGRESS NOTES
Pt lying in bed, three point restraints intact. No signs of distress, VS stable, sitter at bedside, denies needs.

## 2017-06-24 LAB
ANION GAP SERPL CALC-SCNC: 6 MMOL/L (ref 0–11.9)
BASOPHILS # BLD AUTO: 0.8 % (ref 0–1.8)
BASOPHILS # BLD: 0.07 K/UL (ref 0–0.12)
BUN SERPL-MCNC: 12 MG/DL (ref 8–22)
CALCIUM SERPL-MCNC: 8 MG/DL (ref 8.5–10.5)
CHLORIDE SERPL-SCNC: 108 MMOL/L (ref 96–112)
CO2 SERPL-SCNC: 24 MMOL/L (ref 20–33)
CREAT SERPL-MCNC: 0.67 MG/DL (ref 0.5–1.4)
EOSINOPHIL # BLD AUTO: 0.31 K/UL (ref 0–0.51)
EOSINOPHIL NFR BLD: 3.5 % (ref 0–6.9)
ERYTHROCYTE [DISTWIDTH] IN BLOOD BY AUTOMATED COUNT: 49.4 FL (ref 35.9–50)
GFR SERPL CREATININE-BSD FRML MDRD: >60 ML/MIN/1.73 M 2
GLUCOSE SERPL-MCNC: 139 MG/DL (ref 65–99)
HCT VFR BLD AUTO: 39.8 % (ref 42–52)
HGB BLD-MCNC: 13.1 G/DL (ref 14–18)
IMM GRANULOCYTES # BLD AUTO: 0.09 K/UL (ref 0–0.11)
IMM GRANULOCYTES NFR BLD AUTO: 1 % (ref 0–0.9)
LYMPHOCYTES # BLD AUTO: 1.87 K/UL (ref 1–4.8)
LYMPHOCYTES NFR BLD: 21 % (ref 22–41)
MAGNESIUM SERPL-MCNC: 1.6 MG/DL (ref 1.5–2.5)
MCH RBC QN AUTO: 29.6 PG (ref 27–33)
MCHC RBC AUTO-ENTMCNC: 32.9 G/DL (ref 33.7–35.3)
MCV RBC AUTO: 90 FL (ref 81.4–97.8)
MONOCYTES # BLD AUTO: 0.88 K/UL (ref 0–0.85)
MONOCYTES NFR BLD AUTO: 9.9 % (ref 0–13.4)
NEUTROPHILS # BLD AUTO: 5.67 K/UL (ref 1.82–7.42)
NEUTROPHILS NFR BLD: 63.8 % (ref 44–72)
NRBC # BLD AUTO: 0 K/UL
NRBC BLD AUTO-RTO: 0 /100 WBC
PLATELET # BLD AUTO: 306 K/UL (ref 164–446)
PMV BLD AUTO: 9.6 FL (ref 9–12.9)
POTASSIUM SERPL-SCNC: 4 MMOL/L (ref 3.6–5.5)
RBC # BLD AUTO: 4.42 M/UL (ref 4.7–6.1)
SODIUM SERPL-SCNC: 138 MMOL/L (ref 135–145)
WBC # BLD AUTO: 8.9 K/UL (ref 4.8–10.8)

## 2017-06-24 PROCEDURE — A9270 NON-COVERED ITEM OR SERVICE: HCPCS | Performed by: INTERNAL MEDICINE

## 2017-06-24 PROCEDURE — A9270 NON-COVERED ITEM OR SERVICE: HCPCS | Performed by: HOSPITALIST

## 2017-06-24 PROCEDURE — 700105 HCHG RX REV CODE 258: Performed by: INTERNAL MEDICINE

## 2017-06-24 PROCEDURE — 700101 HCHG RX REV CODE 250: Performed by: INTERNAL MEDICINE

## 2017-06-24 PROCEDURE — 700102 HCHG RX REV CODE 250 W/ 637 OVERRIDE(OP): Performed by: INTERNAL MEDICINE

## 2017-06-24 PROCEDURE — 85025 COMPLETE CBC W/AUTO DIFF WBC: CPT

## 2017-06-24 PROCEDURE — 99232 SBSQ HOSP IP/OBS MODERATE 35: CPT | Performed by: INTERNAL MEDICINE

## 2017-06-24 PROCEDURE — 700102 HCHG RX REV CODE 250 W/ 637 OVERRIDE(OP): Performed by: STUDENT IN AN ORGANIZED HEALTH CARE EDUCATION/TRAINING PROGRAM

## 2017-06-24 PROCEDURE — 80048 BASIC METABOLIC PNL TOTAL CA: CPT

## 2017-06-24 PROCEDURE — 83735 ASSAY OF MAGNESIUM: CPT

## 2017-06-24 PROCEDURE — 770021 HCHG ROOM/CARE - ISO PRIVATE

## 2017-06-24 PROCEDURE — 36415 COLL VENOUS BLD VENIPUNCTURE: CPT

## 2017-06-24 PROCEDURE — 700102 HCHG RX REV CODE 250 W/ 637 OVERRIDE(OP): Performed by: HOSPITALIST

## 2017-06-24 RX ORDER — SODIUM CHLORIDE 9 MG/ML
500 INJECTION, SOLUTION INTRAVENOUS ONCE
Status: COMPLETED | OUTPATIENT
Start: 2017-06-24 | End: 2017-06-24

## 2017-06-24 RX ORDER — QUETIAPINE FUMARATE 25 MG/1
25 TABLET, FILM COATED ORAL 2 TIMES DAILY
Status: DISCONTINUED | OUTPATIENT
Start: 2017-06-24 | End: 2017-06-30 | Stop reason: HOSPADM

## 2017-06-24 RX ADMIN — TAMSULOSIN HYDROCHLORIDE 0.8 MG: 0.4 CAPSULE ORAL at 21:33

## 2017-06-24 RX ADMIN — DIVALPROEX SODIUM 125 MG: 125 CAPSULE, COATED PELLETS ORAL at 17:48

## 2017-06-24 RX ADMIN — QUETIAPINE FUMARATE 25 MG: 25 TABLET ORAL at 21:33

## 2017-06-24 RX ADMIN — QUETIAPINE FUMARATE 25 MG: 25 TABLET ORAL at 12:37

## 2017-06-24 RX ADMIN — DIVALPROEX SODIUM 125 MG: 125 CAPSULE, COATED PELLETS ORAL at 00:16

## 2017-06-24 RX ADMIN — VANCOMYCIN HYDROCHLORIDE 125 MG: 10 INJECTION, POWDER, LYOPHILIZED, FOR SOLUTION INTRAVENOUS at 06:05

## 2017-06-24 RX ADMIN — SODIUM CHLORIDE: 9 INJECTION, SOLUTION INTRAVENOUS at 22:54

## 2017-06-24 RX ADMIN — DIVALPROEX SODIUM 125 MG: 125 CAPSULE, COATED PELLETS ORAL at 12:37

## 2017-06-24 RX ADMIN — SODIUM CHLORIDE: 9 INJECTION, SOLUTION INTRAVENOUS at 16:07

## 2017-06-24 RX ADMIN — SODIUM CHLORIDE: 9 INJECTION, SOLUTION INTRAVENOUS at 00:52

## 2017-06-24 RX ADMIN — METRONIDAZOLE 500 MG: 500 INJECTION, SOLUTION INTRAVENOUS at 14:58

## 2017-06-24 RX ADMIN — VANCOMYCIN HYDROCHLORIDE 125 MG: 10 INJECTION, POWDER, LYOPHILIZED, FOR SOLUTION INTRAVENOUS at 17:48

## 2017-06-24 RX ADMIN — DIVALPROEX SODIUM 125 MG: 125 CAPSULE, COATED PELLETS ORAL at 06:05

## 2017-06-24 RX ADMIN — VANCOMYCIN HYDROCHLORIDE 125 MG: 10 INJECTION, POWDER, LYOPHILIZED, FOR SOLUTION INTRAVENOUS at 23:06

## 2017-06-24 RX ADMIN — METRONIDAZOLE 500 MG: 500 INJECTION, SOLUTION INTRAVENOUS at 21:15

## 2017-06-24 RX ADMIN — DIVALPROEX SODIUM 125 MG: 125 CAPSULE, COATED PELLETS ORAL at 23:06

## 2017-06-24 RX ADMIN — VANCOMYCIN HYDROCHLORIDE 125 MG: 10 INJECTION, POWDER, LYOPHILIZED, FOR SOLUTION INTRAVENOUS at 12:38

## 2017-06-24 RX ADMIN — QUETIAPINE FUMARATE 50 MG: 25 TABLET ORAL at 03:32

## 2017-06-24 RX ADMIN — SODIUM CHLORIDE 500 ML: 9 INJECTION, SOLUTION INTRAVENOUS at 10:08

## 2017-06-24 RX ADMIN — METRONIDAZOLE 500 MG: 500 INJECTION, SOLUTION INTRAVENOUS at 06:06

## 2017-06-24 RX ADMIN — VANCOMYCIN HYDROCHLORIDE 125 MG: 10 INJECTION, POWDER, LYOPHILIZED, FOR SOLUTION INTRAVENOUS at 00:16

## 2017-06-24 ASSESSMENT — PAIN SCALES - GENERAL
PAINLEVEL_OUTOF10: 0

## 2017-06-24 NOTE — PROGRESS NOTES
[facility] Internal Medicine Interval Note    Name Neymar Anderson     1945   Age/Sex 71 y.o. male   MRN [id number]   Code Status Full Code     After 5PM or if no immediate response to page, please call for cross-coverage  Attending/Team: Dr. Ibrahim/Evangelista Call [phone] to page   1st Call - Day Intern (R1):   Dr. Newell 2nd Call - Day Sr. Resident (R2/R3):   Dr. Minaya         Chief complaint/ reason for interval visit (Primary Diagnosis)   Diarrhea, fever    Interval Problem Daily Status Update    2 loose mucous stools documented over the past 24 hours, continues to have hypotension documented in AM, quetiapine decreased to his home dose as this can cause hypotension in 3% of cases and tachycardia in 1-11% cases and his tachycardia and hypotension coincided with his increased quetiapine dose, also bolused with 500cc's NS, continue IV fluids, patient alter this AM, no acute events overnight, sitter at bedside.    ROS  Unable to perform ROS: dementia     Consultants/Specialty  Dr. Mejia/ID - Signed off  Dr. Ross/General Surgery - Signed off    Disposition  Inpatient, guarded    Core Measures  EKG reviewed, Labs reviewed, Medications reviewed and Radiology images reviewed  Holland catheter: No Holland  DVT prophylaxis - mechanical: SCDs  Ulcer prophylaxis: No  Antibiotics: Treating active infection/contamination beyond 24 hours perioperative coverage      Physical Exam       Filed Vitals:    17 2000 17 0700 17 0800 17 0900   BP: 107/54 89/49 89/50 92/52   Pulse: 86 81     Temp: 37 °C (98.6 °F) 37 °C (98.6 °F)     Resp: 20 16     Height:       Weight: 69.3 kg (152 lb 12.5 oz)      SpO2: 94% 95%       Body mass index is 23.24 kg/(m^2). Weight: 69.3 kg (152 lb 12.5 oz)  Oxygen Therapy:  Pulse Oximetry: 95 %, O2 (LPM): 0, O2 Delivery: None (Room Air)    Physical Exam  Constitutional:  Alert, disoriented, confused, resting in bed this AM, easily roused, in no  acute distress  Head: Normocephalic and atraumatic.    Eyes: EOM are normal.      Cardiovascular: Normal rate, regular rhythm, normal heart sounds. No murmur heard.  Pulmonary/Chest: Effort normal and breath sounds normal. No respiratory distress. He has no wheezes. He has no rales.    Abdominal: Soft. Bowel sounds are normal. He exhibits no distension and no mass. There is no rebound and no guarding.   Musculoskeletal: Normal range of motion. He exhibits no edema.   Neurological: Not oriented    Skin: Skin is warm. No erythema.    Lab Data Review:      6/24/2017  11:24 AM    Recent Labs      06/22/17   0259 06/23/17   0312  06/24/17   0252   SODIUM  138  138  138   POTASSIUM  3.8  4.1  4.0   CHLORIDE  109  107  108   CO2  23  27  24   BUN  11  14  12   CREATININE  0.68  0.67  0.67   MAGNESIUM  1.9   --   1.6   PHOSPHORUS  3.7   --    --    CALCIUM  7.6*  8.0*  8.0*       Recent Labs      06/22/17   0259 06/23/17   0312  06/24/17   0252   GLUCOSE  95  111*  139*       Recent Labs      06/22/17   0259  06/24/17   0252   RBC  4.18*  4.42*   HEMOGLOBIN  12.5*  13.1*   HEMATOCRIT  37.1*  39.8*   PLATELETCT  259  306       Recent Labs      06/22/17 0259  06/24/17   0252   WBC  8.0  8.9   NEUTSPOLYS  63.10  63.80   LYMPHOCYTES  16.90*  21.00*   MONOCYTES  11.60  9.90   EOSINOPHILS  6.40  3.50   BASOPHILS  0.60  0.80           Assessment/Plan     C. difficile diarrhea  Assessment & Plan  - Severe C.diff/NAP-1 strain positive  - Several day history of diarrhea with worsening mentation in setting of recent abx use  - Leukocytosis on admission, resolved  - Blood Cx: No growth at 5 days  - CT Abd 6/16: Small bilateral pleural effusions with atelectasis versus consolidation. Changes compatible with severe colitis.  - KUB 6/17 on admission: mildly dilated air-filled loops of small bowel, ileus vs. Intermittent or partial obstruction.    - KUB 6/19: Diffuse air-filled distention of small bowel, appearance suggests ileus or  enteritis  - Surgery recs: patient stable, continue medical management, signed off  - ID recs: D/C IV metronidazole, continue PO vanco for total of 14 days, signed off    - Patient has missed several doses of PO vanco due to agitation and refusal, DPOA made aware and agreed to insert a Cortrak if patient continues to refuse. Still refusing a dose every now and again. If he refuses any more doses will place a cortrak (already ordered) as he cannot miss any more doses.  - Abdominal exam benign, clinically stable but condition remains guarded as this is a severe strain.    - Passed 2 loose mucous stools overnight. Metronidazole can cause diarrhea  - Restarted IV metronidazole after fever spike on 6/19              Plan:              -Continue PO Vancomycin day 10/14 and IV metronidazole              -Restart IV fluids as patient hypotensive this AM, bolused with 500cc's past 2 days in AM. Will decrease dose of quetiapine to  outpatient dose              -Nystatin/triamcinolone for perineal irritation              -Frequent contact with DPOA Jaquan and o/p care coordinatorElida                -PT recommended discharge to SNF. Social work in process of finding placement.    Late onset Alzheimer's disease with behavioral disturbance  Assessment & Plan  -Home meds: seroquel 25mg bid, depakote 125mg qid  -Confused and altered on admission, was slowly improving but patient is has continued intermittent aggression, agitation, uncooperative. -Has required Haldol PRNs for acute agitation.              Plan:              -Continue depakote home dose, seroquel 25mg BID, will also add PRN seroquel, can try using this first before IV haldol              -DC ativan (made patient much worse), use Haldol PRN for agitation              -Discussed with DPOAs on importance of re-orientation of patient with familiar faces. Counseled caregiver at bedside to keep blinds  open for natural sunlight during  daytime.    Anemia  Assessment & Plan  12.3 on admission, stable since.  -May be 2/2 iron deficiency vs B12/folate deficiency vs ACD. May also be 2/2 depakote side effect.  -Can pursue these causes after acute stabilization of C diff colitis. Will continue fluids and multivitamins.

## 2017-06-24 NOTE — PROGRESS NOTES
Patient resting. no needs verbalized. No pain or discomfort reported. Bed low locked, call bell within reach, non skid socks on bed alarm activated. Sitter at bedside

## 2017-06-24 NOTE — PROGRESS NOTES
Patient resting. Able to follow commands and request a soda. Sitter at bedside. No pain or discomfort reported. Bed low locked, call bell within reach, non skid socks on bed alarm activated.

## 2017-06-24 NOTE — CARE PLAN
Problem: Safety  Goal: Will remain free from injury  Outcome: PROGRESSING AS EXPECTED  Patient remains free from injury    Problem: Infection  Goal: Will remain free from infection  Outcome: PROGRESSING AS EXPECTED  Patient receiving IV abx as prescribed

## 2017-06-24 NOTE — PROGRESS NOTES
Report received at bedside, assumed care. Pt is awake in bed. A&O x 1. Pt denies pain when asked. No other concerns, complains or distress. Chart reviewed and POC updated with patient. Bed in lowest position and call light within reach. Sitter at bedside

## 2017-06-25 PROBLEM — L25.8 DERMATITIS ASSOCIATED WITH INCONTINENCE: Status: ACTIVE | Noted: 2017-06-25

## 2017-06-25 PROBLEM — R32 DERMATITIS ASSOCIATED WITH INCONTINENCE: Status: ACTIVE | Noted: 2017-06-25

## 2017-06-25 LAB
ANION GAP SERPL CALC-SCNC: 5 MMOL/L (ref 0–11.9)
BACTERIA BLD CULT: NORMAL
BACTERIA BLD CULT: NORMAL
BASOPHILS # BLD AUTO: 0.4 % (ref 0–1.8)
BASOPHILS # BLD: 0.05 K/UL (ref 0–0.12)
BUN SERPL-MCNC: 11 MG/DL (ref 8–22)
CALCIUM SERPL-MCNC: 7.8 MG/DL (ref 8.5–10.5)
CHLORIDE SERPL-SCNC: 107 MMOL/L (ref 96–112)
CO2 SERPL-SCNC: 26 MMOL/L (ref 20–33)
CREAT SERPL-MCNC: 0.61 MG/DL (ref 0.5–1.4)
EOSINOPHIL # BLD AUTO: 0.32 K/UL (ref 0–0.51)
EOSINOPHIL NFR BLD: 2.7 % (ref 0–6.9)
ERYTHROCYTE [DISTWIDTH] IN BLOOD BY AUTOMATED COUNT: 50.1 FL (ref 35.9–50)
GFR SERPL CREATININE-BSD FRML MDRD: >60 ML/MIN/1.73 M 2
GLUCOSE SERPL-MCNC: 101 MG/DL (ref 65–99)
HCT VFR BLD AUTO: 37.2 % (ref 42–52)
HGB BLD-MCNC: 12.2 G/DL (ref 14–18)
IMM GRANULOCYTES # BLD AUTO: 0.09 K/UL (ref 0–0.11)
IMM GRANULOCYTES NFR BLD AUTO: 0.8 % (ref 0–0.9)
LYMPHOCYTES # BLD AUTO: 1.7 K/UL (ref 1–4.8)
LYMPHOCYTES NFR BLD: 14.4 % (ref 22–41)
MAGNESIUM SERPL-MCNC: 1.6 MG/DL (ref 1.5–2.5)
MCH RBC QN AUTO: 29.5 PG (ref 27–33)
MCHC RBC AUTO-ENTMCNC: 32.8 G/DL (ref 33.7–35.3)
MCV RBC AUTO: 90.1 FL (ref 81.4–97.8)
MONOCYTES # BLD AUTO: 0.84 K/UL (ref 0–0.85)
MONOCYTES NFR BLD AUTO: 7.1 % (ref 0–13.4)
NEUTROPHILS # BLD AUTO: 8.84 K/UL (ref 1.82–7.42)
NEUTROPHILS NFR BLD: 74.6 % (ref 44–72)
NRBC # BLD AUTO: 0 K/UL
NRBC BLD AUTO-RTO: 0 /100 WBC
PLATELET # BLD AUTO: 274 K/UL (ref 164–446)
PMV BLD AUTO: 9.3 FL (ref 9–12.9)
POTASSIUM SERPL-SCNC: 3.9 MMOL/L (ref 3.6–5.5)
RBC # BLD AUTO: 4.13 M/UL (ref 4.7–6.1)
SIGNIFICANT IND 70042: NORMAL
SIGNIFICANT IND 70042: NORMAL
SITE SITE: NORMAL
SITE SITE: NORMAL
SODIUM SERPL-SCNC: 138 MMOL/L (ref 135–145)
SOURCE SOURCE: NORMAL
SOURCE SOURCE: NORMAL
WBC # BLD AUTO: 11.8 K/UL (ref 4.8–10.8)

## 2017-06-25 PROCEDURE — 36415 COLL VENOUS BLD VENIPUNCTURE: CPT

## 2017-06-25 PROCEDURE — 700101 HCHG RX REV CODE 250: Performed by: INTERNAL MEDICINE

## 2017-06-25 PROCEDURE — 302255 BARRIER CREAM MOISTURE BAZA PROTECT: Performed by: INTERNAL MEDICINE

## 2017-06-25 PROCEDURE — 99232 SBSQ HOSP IP/OBS MODERATE 35: CPT | Performed by: INTERNAL MEDICINE

## 2017-06-25 PROCEDURE — A9270 NON-COVERED ITEM OR SERVICE: HCPCS | Performed by: INTERNAL MEDICINE

## 2017-06-25 PROCEDURE — 85025 COMPLETE CBC W/AUTO DIFF WBC: CPT

## 2017-06-25 PROCEDURE — 700102 HCHG RX REV CODE 250 W/ 637 OVERRIDE(OP): Performed by: INTERNAL MEDICINE

## 2017-06-25 PROCEDURE — 80048 BASIC METABOLIC PNL TOTAL CA: CPT

## 2017-06-25 PROCEDURE — 700105 HCHG RX REV CODE 258: Performed by: INTERNAL MEDICINE

## 2017-06-25 PROCEDURE — 770021 HCHG ROOM/CARE - ISO PRIVATE

## 2017-06-25 PROCEDURE — 83735 ASSAY OF MAGNESIUM: CPT

## 2017-06-25 PROCEDURE — 700102 HCHG RX REV CODE 250 W/ 637 OVERRIDE(OP): Performed by: STUDENT IN AN ORGANIZED HEALTH CARE EDUCATION/TRAINING PROGRAM

## 2017-06-25 PROCEDURE — 700111 HCHG RX REV CODE 636 W/ 250 OVERRIDE (IP): Performed by: INTERNAL MEDICINE

## 2017-06-25 RX ORDER — ZINC OXIDE 20 %
OINTMENT (GRAM) TOPICAL PRN
Status: DISCONTINUED | OUTPATIENT
Start: 2017-06-25 | End: 2017-06-30 | Stop reason: HOSPADM

## 2017-06-25 RX ADMIN — VANCOMYCIN HYDROCHLORIDE 125 MG: 10 INJECTION, POWDER, LYOPHILIZED, FOR SOLUTION INTRAVENOUS at 06:02

## 2017-06-25 RX ADMIN — VANCOMYCIN HYDROCHLORIDE 125 MG: 10 INJECTION, POWDER, LYOPHILIZED, FOR SOLUTION INTRAVENOUS at 19:06

## 2017-06-25 RX ADMIN — HALOPERIDOL LACTATE 5 MG: 5 INJECTION, SOLUTION INTRAMUSCULAR at 10:21

## 2017-06-25 RX ADMIN — DIVALPROEX SODIUM 125 MG: 125 CAPSULE, COATED PELLETS ORAL at 19:06

## 2017-06-25 RX ADMIN — QUETIAPINE FUMARATE 25 MG: 25 TABLET ORAL at 10:30

## 2017-06-25 RX ADMIN — DIVALPROEX SODIUM 125 MG: 125 CAPSULE, COATED PELLETS ORAL at 12:50

## 2017-06-25 RX ADMIN — DIVALPROEX SODIUM 125 MG: 125 CAPSULE, COATED PELLETS ORAL at 06:02

## 2017-06-25 RX ADMIN — HALOPERIDOL LACTATE 5 MG: 5 INJECTION, SOLUTION INTRAMUSCULAR at 22:50

## 2017-06-25 RX ADMIN — VANCOMYCIN HYDROCHLORIDE 125 MG: 10 INJECTION, POWDER, LYOPHILIZED, FOR SOLUTION INTRAVENOUS at 12:50

## 2017-06-25 RX ADMIN — TAMSULOSIN HYDROCHLORIDE 0.8 MG: 0.4 CAPSULE ORAL at 21:08

## 2017-06-25 RX ADMIN — QUETIAPINE FUMARATE 25 MG: 25 TABLET ORAL at 21:08

## 2017-06-25 RX ADMIN — SODIUM CHLORIDE: 9 INJECTION, SOLUTION INTRAVENOUS at 06:13

## 2017-06-25 RX ADMIN — METRONIDAZOLE 500 MG: 500 INJECTION, SOLUTION INTRAVENOUS at 06:02

## 2017-06-25 ASSESSMENT — PAIN SCALES - GENERAL
PAINLEVEL_OUTOF10: 0
PAINLEVEL_OUTOF10: 0

## 2017-06-25 NOTE — PROGRESS NOTES
[facility] [place] Internal Medicine Interval Note    Name Neymar Anderson     1945   Age/Sex 71 y.o. male   MRN [id number]   Code Status Full Code     After 5PM or if no immediate response to page, please call for cross-coverage  Attending/Team: Dr. Almonte/Evangelista Call [phone] to page   1st Call - Day Intern (R1):   Dr. Newell 2nd Call - Day Sr. Resident (R2/R3):   Dr. Minaya         Chief complaint/ reason for interval visit (Primary Diagnosis)   Diarrhea, fever    Interval Problem Daily Status Update    4 loose brown stools overnight, red excoriated gluteal area likely secondary to incontinence dermatitis, add zinc oxide for barrier protection and condom cath to avoid further irritation, patient has not tolerated condom cath in the past and continues to pull it off during day, not vocal this AM, closed eyes this AM, trial of soft restraints off did not go well as the patient is slightly more agitated than usual and in instable on his feet, ok to put him back in restraints if deemed unsafe, 1:1 sitter at bedside.    ROS  Unable to perform ROS: dementia     Consultants/Specialty  Dr. Mejia/ID - Signed off  Dr. Ross/General Surgery - Signed off    Disposition  Inpatient, guarded    Core Measures  EKG reviewed, Labs reviewed, Medications reviewed and Radiology images reviewed  Holland catheter: No Holland  DVT prophylaxis - mechanical: SCDs  Ulcer prophylaxis: No  Antibiotics: Treating active infection/contamination beyond 24 hours perioperative coverage      Physical Exam       Filed Vitals:    17 1500 17 1800 17 2100 17 0814   BP: 96/56 95/56 109/63 115/63   Pulse: 88 92 100 96   Temp: 36.8 °C (98.2 °F) 36.8 °C (98.3 °F) 36.7 °C (98 °F) 36.9 °C (98.5 °F)   Resp: 16 18 16 16   Height:       Weight:   63.6 kg (140 lb 3.4 oz)    SpO2: 95% 96% 93% 93%     Body mass index is 21.32 kg/(m^2). Weight: 63.6 kg (140 lb 3.4 oz)  Oxygen Therapy:  Pulse Oximetry: 93 %, O2  (LPM): 0, O2 Delivery: None (Room Air)    Physical Exam  Constitutional:  Alert, disoriented, confused, resting in bed this AM, easily roused, in no acute distress  Head: Normocephalic and atraumatic.    Eyes: EOM are normal.      Cardiovascular: Normal rate, regular rhythm, normal heart sounds. No murmur heard.  Pulmonary/Chest: Effort normal and breath sounds normal. No respiratory distress. He has no wheezes. He has no rales.    Abdominal: Soft. Bowel sounds are normal. He exhibits no distension and no mass. There is no rebound and no guarding.   Musculoskeletal: Normal range of motion. He exhibits no edema.   Neurological: Not oriented    Skin: Skin is warm. No erythema.    Lab Data Review:      6/24/2017  11:24 AM    Recent Labs      06/23/17 0312 06/24/17 0252  06/25/17   0339   SODIUM  138  138  138   POTASSIUM  4.1  4.0  3.9   CHLORIDE  107  108  107   CO2  27  24  26   BUN  14  12  11   CREATININE  0.67  0.67  0.61   MAGNESIUM   --   1.6  1.6   CALCIUM  8.0*  8.0*  7.8*       Recent Labs      06/23/17   0312  06/24/17   0252  06/25/17   0339   GLUCOSE  111*  139*  101*       Recent Labs      06/24/17 0252  06/25/17   0339   RBC  4.42*  4.13*   HEMOGLOBIN  13.1*  12.2*   HEMATOCRIT  39.8*  37.2*   PLATELETCT  306  274       Recent Labs      06/24/17 0252  06/25/17   0339   WBC  8.9  11.8*   NEUTSPOLYS  63.80  74.60*   LYMPHOCYTES  21.00*  14.40*   MONOCYTES  9.90  7.10   EOSINOPHILS  3.50  2.70   BASOPHILS  0.80  0.40           Assessment/Plan     C. difficile diarrhea  - Severe C.diff/NAP-1 strain positive  - Several day history of diarrhea with worsening mentation in setting of recent abx use  - Leukocytosis on admission, resolved  - Blood Cx: No growth at 5 days  - CT Abd 6/16: Small bilateral pleural effusions with atelectasis versus consolidation. Changes compatible with severe colitis.  - KUB 6/17 on admission: mildly dilated air-filled loops of small bowel, ileus vs. Intermittent or partial  obstruction.    - KUB 6/19: Diffuse air-filled distention of small bowel, appearance suggests ileus or enteritis  - Surgery recs: patient stable, continue medical management, signed off  - ID recs: D/C IV metronidazole, continue PO vanco for total of 14 days, signed off    - Patient has missed several doses of PO vanco due to agitation and refusal, DPOA made aware and agreed to insert a Cortrak if patient continues to refuse. Still refusing a dose every now and again. If he refuses any more doses will place a cortrak (already ordered) as he cannot miss any more doses.  - Abdominal exam benign, clinically stable but condition remains guarded as this is a severe strain.    - Passed 4 loose mucous stools overnight. Metronidazole can cause diarrhea. Will D/C metronidazole.  - Restarted IV metronidazole after fever spike on 6/19              Plan:              -Continue PO Vancomycin day 11/14. D/C IV metronidazole              -D/C IV fluids as it is causing frequent urination with incontinence dermatitis              -Nystatin/triamcinolone for perineal irritation, zinc oxide added, hold off on topical abx for now              -Frequent contact with DPOA Jaquan and o/p care coordinatorElida                -PT recommended discharge to SNF. Social work in process of finding placement.    Late onset Alzheimer's disease with behavioral disturbance  -Home meds: seroquel 25mg bid, depakote 125mg qid  -Confused and altered on admission, was slowly improving but patient is has continued intermittent aggression, agitation, uncooperative. -Has required Haldol PRNs for acute agitation.              Plan:              -Continue depakote home dose, seroquel 25mg BID, will also add PRN seroquel, can try using this first before IV haldol              -DC ativan (made patient much worse), use Haldol PRN for agitation              -Discussed with DPOAs on importance of re-orientation of patient with familiar faces. Counseled  caregiver at bedside to keep blinds  open for natural sunlight during daytime.    Anemia  -12.3 on admission, stable since.  -May be 2/2 iron deficiency vs B12/folate deficiency vs ACD. May also be 2/2 depakote side effect.  -Can pursue these causes after acute stabilization of C diff colitis. Will continue fluids and multivitamins.    Incontinence dermatitis  -Patient with frequent urinary and fecal incontinence causing worsening incontinence dermatitis   -Nystatin/triamcinolone since admit  -Add zinc oxide for barrier protection  -Condom catheter as tolerated. Continues to pull it off.  -Rectal tube PRN

## 2017-06-25 NOTE — PROGRESS NOTES
Report received at bedside, assumed care. Pt is awake in bed. A&O x 1. Pt denies pain. No other concerns, complains or distress. Chart reviewed and POC updated with patient. Bed in lowest position and call light within reach. Sitter at bedside

## 2017-06-25 NOTE — PROGRESS NOTES
Patient toileted and changed. Restraints in place brayan UE, RLE. Bed low locked srx2 bed alarm on. Sitter at bedside.

## 2017-06-25 NOTE — PROGRESS NOTES
Pt arrived to room 608 with sitter at bedside. Restraints in place, pt resting with eyes closed. Will continue to monitor.

## 2017-06-25 NOTE — PROGRESS NOTES
Patient resting. Oriented to person only. Redirection provided. Sitter at bedside. Bed low locked, Srx2, restx3 UE, LLE. Call bell within reach,

## 2017-06-26 LAB
BASOPHILS # BLD AUTO: 0.7 % (ref 0–1.8)
BASOPHILS # BLD: 0.07 K/UL (ref 0–0.12)
EOSINOPHIL # BLD AUTO: 0.18 K/UL (ref 0–0.51)
EOSINOPHIL NFR BLD: 1.7 % (ref 0–6.9)
ERYTHROCYTE [DISTWIDTH] IN BLOOD BY AUTOMATED COUNT: 50.2 FL (ref 35.9–50)
FERRITIN SERPL-MCNC: 220.1 NG/ML (ref 22–322)
FOLATE SERPL-MCNC: 20.1 NG/ML
HCT VFR BLD AUTO: 38.3 % (ref 42–52)
HGB BLD-MCNC: 12.8 G/DL (ref 14–18)
HGB RETIC QN AUTO: 35.4 PG/CELL (ref 29–35)
IMM GRANULOCYTES # BLD AUTO: 0.07 K/UL (ref 0–0.11)
IMM GRANULOCYTES NFR BLD AUTO: 0.7 % (ref 0–0.9)
IMM RETICS NFR: 10.1 % (ref 9.3–17.4)
LYMPHOCYTES # BLD AUTO: 1.82 K/UL (ref 1–4.8)
LYMPHOCYTES NFR BLD: 17.3 % (ref 22–41)
MCH RBC QN AUTO: 29.8 PG (ref 27–33)
MCHC RBC AUTO-ENTMCNC: 33.4 G/DL (ref 33.7–35.3)
MCV RBC AUTO: 89.1 FL (ref 81.4–97.8)
MONOCYTES # BLD AUTO: 0.71 K/UL (ref 0–0.85)
MONOCYTES NFR BLD AUTO: 6.8 % (ref 0–13.4)
NEUTROPHILS # BLD AUTO: 7.66 K/UL (ref 1.82–7.42)
NEUTROPHILS NFR BLD: 72.8 % (ref 44–72)
NRBC # BLD AUTO: 0 K/UL
NRBC BLD AUTO-RTO: 0 /100 WBC
PLATELET # BLD AUTO: 279 K/UL (ref 164–446)
PMV BLD AUTO: 9.7 FL (ref 9–12.9)
RBC # BLD AUTO: 4.3 M/UL (ref 4.7–6.1)
RETICS # AUTO: 0.09 M/UL (ref 0.04–0.06)
RETICS/RBC NFR: 2.1 % (ref 0.8–2.1)
VIT B12 SERPL-MCNC: 880 PG/ML (ref 211–911)
WBC # BLD AUTO: 10.5 K/UL (ref 4.8–10.8)

## 2017-06-26 PROCEDURE — 700102 HCHG RX REV CODE 250 W/ 637 OVERRIDE(OP): Performed by: INTERNAL MEDICINE

## 2017-06-26 PROCEDURE — 82607 VITAMIN B-12: CPT

## 2017-06-26 PROCEDURE — A9270 NON-COVERED ITEM OR SERVICE: HCPCS | Performed by: INTERNAL MEDICINE

## 2017-06-26 PROCEDURE — 85025 COMPLETE CBC W/AUTO DIFF WBC: CPT

## 2017-06-26 PROCEDURE — 700102 HCHG RX REV CODE 250 W/ 637 OVERRIDE(OP): Performed by: STUDENT IN AN ORGANIZED HEALTH CARE EDUCATION/TRAINING PROGRAM

## 2017-06-26 PROCEDURE — 82728 ASSAY OF FERRITIN: CPT

## 2017-06-26 PROCEDURE — 99233 SBSQ HOSP IP/OBS HIGH 50: CPT | Performed by: INTERNAL MEDICINE

## 2017-06-26 PROCEDURE — 85046 RETICYTE/HGB CONCENTRATE: CPT

## 2017-06-26 PROCEDURE — 82746 ASSAY OF FOLIC ACID SERUM: CPT

## 2017-06-26 PROCEDURE — 700105 HCHG RX REV CODE 258: Performed by: INTERNAL MEDICINE

## 2017-06-26 PROCEDURE — 770021 HCHG ROOM/CARE - ISO PRIVATE

## 2017-06-26 PROCEDURE — 36415 COLL VENOUS BLD VENIPUNCTURE: CPT

## 2017-06-26 RX ORDER — SODIUM CHLORIDE 9 MG/ML
INJECTION, SOLUTION INTRAVENOUS CONTINUOUS
Status: DISCONTINUED | OUTPATIENT
Start: 2017-06-26 | End: 2017-06-29

## 2017-06-26 RX ADMIN — VANCOMYCIN HYDROCHLORIDE 125 MG: 10 INJECTION, POWDER, LYOPHILIZED, FOR SOLUTION INTRAVENOUS at 05:15

## 2017-06-26 RX ADMIN — THERA TABS 1 TABLET: TAB at 07:26

## 2017-06-26 RX ADMIN — QUETIAPINE FUMARATE 25 MG: 25 TABLET ORAL at 07:26

## 2017-06-26 RX ADMIN — DIVALPROEX SODIUM 125 MG: 125 CAPSULE, COATED PELLETS ORAL at 11:14

## 2017-06-26 RX ADMIN — DIVALPROEX SODIUM 125 MG: 125 CAPSULE, COATED PELLETS ORAL at 18:02

## 2017-06-26 RX ADMIN — SODIUM CHLORIDE: 9 INJECTION, SOLUTION INTRAVENOUS at 08:20

## 2017-06-26 RX ADMIN — VANCOMYCIN HYDROCHLORIDE 125 MG: 10 INJECTION, POWDER, LYOPHILIZED, FOR SOLUTION INTRAVENOUS at 18:02

## 2017-06-26 RX ADMIN — VANCOMYCIN HYDROCHLORIDE 125 MG: 10 INJECTION, POWDER, LYOPHILIZED, FOR SOLUTION INTRAVENOUS at 11:14

## 2017-06-26 ASSESSMENT — PAIN SCALES - GENERAL
PAINLEVEL_OUTOF10: 0

## 2017-06-26 NOTE — CARE PLAN
Problem: Safety  Goal: Will remain free from injury  Bed locked in lowest position, bed alarm deferred, personal sitter at bedside, patient has wrist restraints for safety, pulling at LDA, tread socks on, patient is reoriented.    Problem: Infection  Goal: Will remain free from infection  Monitor for s/sx of infection

## 2017-06-26 NOTE — PROGRESS NOTES
Received report from day shift RN. Discussed POC with pt., verbalized understanding, assumed care @1915. A&Ox1. Pt alert to self only. On room air. Lung sounds diminished. No complaints of pain. On 3 point restraint. Skin intact, no signs of injury noted. On isolation for C. Diff. Noted excoriation on sacrum and groin area. 1:1 sitter hired  By family at bedside. Built in bed alarm on. Safety precautions in place; treaded socks on, call light within reach, personal belongings within reach, upper bed rails up.

## 2017-06-26 NOTE — PROGRESS NOTES
[facility] [place] Internal Medicine Interval Note    Name Neymar Anderson     1945   Age/Sex 71 y.o. male   MRN [id number]   Code Status Full Code     After 5PM or if no immediate response to page, please call for cross-coverage  Attending/Team: Dr. Chester/Evangelista Call [phone] to page   1st Call - Day Intern (R1):   Dr. Newell 2nd Call - Day Sr. Resident (R2/R3):   Dr. Chopra         Chief complaint/ reason for interval visit (Primary Diagnosis)   Diarrhea, fever    Interval Problem Daily Status Update    3 large loose brown stools in past 24 hours, refused 2 doses of vanc overnight, accepting doses this AM, placed back on restraints as the patient in instable on his feet and was combative, condom cath in place    ROS  Unable to perform ROS: dementia     Consultants/Specialty  Dr. Mejia/ID - Signed off  Dr. Ross/General Surgery - Signed off    Disposition  Inpatient, guarded    Core Measures  EKG reviewed, Labs reviewed, Medications reviewed and Radiology images reviewed  Holland catheter: No Holland  DVT prophylaxis - mechanical: SCDs  Ulcer prophylaxis: No  Antibiotics: Treating active infection/contamination beyond 24 hours perioperative coverage      Physical Exam       Filed Vitals:    17 1643 17 2000 17 0346 17 0720   BP: 102/60 97/58 91/58 99/64   Pulse: 93 85 100 95   Temp: 37 °C (98.6 °F) 36.9 °C (98.4 °F) 36.6 °C (97.8 °F) 37 °C (98.6 °F)   Resp: 18 18 16 18   Height:       Weight:       SpO2: 93% 93% 94% 92%     Body mass index is 21.32 kg/(m^2).    Oxygen Therapy:  Pulse Oximetry: 92 %, O2 (LPM): 0, O2 Delivery: None (Room Air)    Physical Exam  Constitutional:  Alert, disoriented, confused, resting in bed this AM, easily roused, in no acute distress  Head: Normocephalic and atraumatic.    Eyes: EOM are normal.      Cardiovascular: Normal rate, regular rhythm, normal heart sounds. No murmur heard.  Pulmonary/Chest: Effort normal and breath sounds  normal. No respiratory distress. He has no wheezes. He has no rales.    Abdominal: Soft. Bowel sounds are normal. He exhibits no distension and no mass. There is no rebound and no guarding.   Musculoskeletal: Normal range of motion. He exhibits no edema.   Neurological: Not oriented    Skin: Skin is warm. No erythema.    Lab Data Review:      6/24/2017  11:24 AM    Recent Labs      06/24/17 0252  06/25/17   0339   SODIUM  138  138   POTASSIUM  4.0  3.9   CHLORIDE  108  107   CO2  24  26   BUN  12  11   CREATININE  0.67  0.61   MAGNESIUM  1.6  1.6   CALCIUM  8.0*  7.8*       Recent Labs      06/24/17 0252  06/25/17   0339   GLUCOSE  139*  101*       Recent Labs      06/24/17 0252 06/25/17   0339  06/26/17   0205   RBC  4.42*  4.13*  4.30*   HEMOGLOBIN  13.1*  12.2*  12.8*   HEMATOCRIT  39.8*  37.2*  38.3*   PLATELETCT  306  274  279   FERRITIN   --    --   220.1       Recent Labs      06/24/17 0252 06/25/17   0339  06/26/17   0205   WBC  8.9  11.8*  10.5   NEUTSPOLYS  63.80  74.60*  72.80*   LYMPHOCYTES  21.00*  14.40*  17.30*   MONOCYTES  9.90  7.10  6.80   EOSINOPHILS  3.50  2.70  1.70   BASOPHILS  0.80  0.40  0.70           Assessment/Plan     C. difficile diarrhea  - Severe C.diff/NAP-1 strain positive  - Several day history of diarrhea with worsening mentation in setting of recent abx use  - Leukocytosis on admission, resolved  - Blood Cx: No growth at 5 days  - CT Abd 6/16: Small bilateral pleural effusions with atelectasis versus consolidation. Changes compatible with severe colitis.  - KUB 6/17 on admission: mildly dilated air-filled loops of small bowel, ileus vs. Intermittent or partial obstruction.    - KUB 6/19: Diffuse air-filled distention of small bowel, appearance suggests ileus or enteritis  - Surgery recs: patient stable, continue medical management, signed off  - ID recs: D/C IV metronidazole, continue PO vanco for total of 14 days, signed off    - Patient has missed several doses of PO  vanco due to agitation and refusal, DPOA made aware and agreed to insert a Cortrak if patient continues to refuse. Still refusing a dose every now and again. If he refuses any more doses will place a cortrak (already ordered) as he cannot miss any more doses.  - Abdominal exam benign, clinically stable but condition remains guarded as this is a severe strain.    - Passed 4 loose mucous stools overnight. Metronidazole can cause diarrhea. Will D/C metronidazole.  - Restarted IV metronidazole after fever spike on 6/19. D/C'ed again on 6/25.              Plan:              -Continue PO Vancomycin day 12/14.              -Fluids restarted for hypotension              -Nystatin/triamcinolone for perineal irritation, zinc oxide added              -Frequent contact with DPOA Jaquan and o/p care coordinatorElida                -PT recommended discharge to SNF. Social work in process of finding placement.    Late onset Alzheimer's disease with behavioral disturbance  -Home meds: seroquel 25mg bid, depakote 125mg qid  -Confused and altered on admission, was slowly improving but patient is has continued intermittent aggression, agitation, uncooperative. -Has required Haldol PRNs for acute agitation.              Plan:              -Continue depakote home dose, seroquel 25mg BID, will also add PRN seroquel, can try using this first before IV haldol              -DC ativan (made patient much worse), use Haldol PRN for agitation              -Discussed with DPOAs on importance of re-orientation of patient with familiar faces. Counseled caregiver at bedside to keep blinds  open for natural sunlight during daytime.    Incontinence dermatitis  -Patient with frequent urinary and fecal incontinence causing worsening incontinence dermatitis   -Nystatin/triamcinolone since admit  -Add zinc oxide for barrier protection  -Condom catheter as tolerated. Continues to pull it off.  -Rectal tube PRN

## 2017-06-26 NOTE — PROGRESS NOTES
Pt refused both midnight and AM Vanco. Pt is arousable but once RN tells pt he has to take his medications pt goes back to sleep and refuses to respond further. Per MD note pt may need cortrak if pt continues to refuse. Will relay to dayshift.

## 2017-06-26 NOTE — WOUND TEAM
Wound team consult placed regarding redness to buttocks.  Upon observation, Pt with blanching redness throughout buttocks which is consistent with his IAD.  NRSG to continue with WILLA paste and turning Q2 hours.  Pt is also on MYLES bed.  No skilled wound needs at this time.

## 2017-06-26 NOTE — CARE PLAN
Problem: Safety  Goal: Will remain free from injury  Outcome: PROGRESSING AS EXPECTED  Restraints in place, sitter at bedside. Fall precautions in place.     Problem: Bowel/Gastric:  Goal: Normal bowel function is maintained or improved  Outcome: PROGRESSING AS EXPECTED  Pt having formed soft stools.

## 2017-06-26 NOTE — CARE PLAN
Problem: Infection  Goal: Will remain free from infection  Outcome: PROGRESSING AS EXPECTED  On oral Vanco for C. Diff    Problem: Skin Integrity  Goal: Risk for impaired skin integrity will decrease  Outcome: PROGRESSING AS EXPECTED  Pt incontinent, noted redness/excoriation on pt sacrum/groin area. Applied Elly cream. Kept skin clean and dry. Will continue to monitor and intervene accordingly.

## 2017-06-26 NOTE — PROGRESS NOTES
Patient is assessed,safety protocol in place, restraints in place and verified restraint order and expiration. Patient is reoriented but does not demonstrate understanding. Patient has personal caregiver in place, condom catheter on patient to prevent skin breakdown. IV replaced. Continue to monitor.

## 2017-06-26 NOTE — DISCHARGE PLANNING
[facility] has declined patient as they do not have an isolation bed available at this time.

## 2017-06-27 LAB
ANION GAP SERPL CALC-SCNC: 3 MMOL/L (ref 0–11.9)
BASOPHILS # BLD AUTO: 0.6 % (ref 0–1.8)
BASOPHILS # BLD: 0.08 K/UL (ref 0–0.12)
BUN SERPL-MCNC: 10 MG/DL (ref 8–22)
CALCIUM SERPL-MCNC: 8 MG/DL (ref 8.5–10.5)
CHLORIDE SERPL-SCNC: 108 MMOL/L (ref 96–112)
CO2 SERPL-SCNC: 27 MMOL/L (ref 20–33)
CREAT SERPL-MCNC: 0.59 MG/DL (ref 0.5–1.4)
EOSINOPHIL # BLD AUTO: 0.23 K/UL (ref 0–0.51)
EOSINOPHIL NFR BLD: 1.8 % (ref 0–6.9)
ERYTHROCYTE [DISTWIDTH] IN BLOOD BY AUTOMATED COUNT: 50.9 FL (ref 35.9–50)
GFR SERPL CREATININE-BSD FRML MDRD: >60 ML/MIN/1.73 M 2
GLUCOSE SERPL-MCNC: 101 MG/DL (ref 65–99)
HCT VFR BLD AUTO: 37.8 % (ref 42–52)
HGB BLD-MCNC: 12.3 G/DL (ref 14–18)
IMM GRANULOCYTES # BLD AUTO: 0.11 K/UL (ref 0–0.11)
IMM GRANULOCYTES NFR BLD AUTO: 0.9 % (ref 0–0.9)
LYMPHOCYTES # BLD AUTO: 1.74 K/UL (ref 1–4.8)
LYMPHOCYTES NFR BLD: 13.9 % (ref 22–41)
MAGNESIUM SERPL-MCNC: 1.7 MG/DL (ref 1.5–2.5)
MCH RBC QN AUTO: 29.5 PG (ref 27–33)
MCHC RBC AUTO-ENTMCNC: 32.5 G/DL (ref 33.7–35.3)
MCV RBC AUTO: 90.6 FL (ref 81.4–97.8)
MONOCYTES # BLD AUTO: 0.86 K/UL (ref 0–0.85)
MONOCYTES NFR BLD AUTO: 6.9 % (ref 0–13.4)
NEUTROPHILS # BLD AUTO: 9.51 K/UL (ref 1.82–7.42)
NEUTROPHILS NFR BLD: 75.9 % (ref 44–72)
NRBC # BLD AUTO: 0 K/UL
NRBC BLD AUTO-RTO: 0 /100 WBC
PLATELET # BLD AUTO: 302 K/UL (ref 164–446)
PMV BLD AUTO: 10 FL (ref 9–12.9)
POTASSIUM SERPL-SCNC: 3.9 MMOL/L (ref 3.6–5.5)
RBC # BLD AUTO: 4.17 M/UL (ref 4.7–6.1)
SODIUM SERPL-SCNC: 138 MMOL/L (ref 135–145)
WBC # BLD AUTO: 12.5 K/UL (ref 4.8–10.8)

## 2017-06-27 PROCEDURE — 83735 ASSAY OF MAGNESIUM: CPT

## 2017-06-27 PROCEDURE — 700111 HCHG RX REV CODE 636 W/ 250 OVERRIDE (IP): Performed by: INTERNAL MEDICINE

## 2017-06-27 PROCEDURE — 700105 HCHG RX REV CODE 258: Performed by: INTERNAL MEDICINE

## 2017-06-27 PROCEDURE — A9270 NON-COVERED ITEM OR SERVICE: HCPCS | Performed by: INTERNAL MEDICINE

## 2017-06-27 PROCEDURE — 700102 HCHG RX REV CODE 250 W/ 637 OVERRIDE(OP): Performed by: INTERNAL MEDICINE

## 2017-06-27 PROCEDURE — 700102 HCHG RX REV CODE 250 W/ 637 OVERRIDE(OP): Performed by: STUDENT IN AN ORGANIZED HEALTH CARE EDUCATION/TRAINING PROGRAM

## 2017-06-27 PROCEDURE — 770021 HCHG ROOM/CARE - ISO PRIVATE

## 2017-06-27 PROCEDURE — 36415 COLL VENOUS BLD VENIPUNCTURE: CPT

## 2017-06-27 PROCEDURE — 85025 COMPLETE CBC W/AUTO DIFF WBC: CPT

## 2017-06-27 PROCEDURE — 80048 BASIC METABOLIC PNL TOTAL CA: CPT

## 2017-06-27 PROCEDURE — 99233 SBSQ HOSP IP/OBS HIGH 50: CPT | Performed by: INTERNAL MEDICINE

## 2017-06-27 RX ADMIN — TAMSULOSIN HYDROCHLORIDE 0.8 MG: 0.4 CAPSULE ORAL at 22:16

## 2017-06-27 RX ADMIN — DIVALPROEX SODIUM 125 MG: 125 CAPSULE, COATED PELLETS ORAL at 17:28

## 2017-06-27 RX ADMIN — HALOPERIDOL LACTATE 5 MG: 5 INJECTION, SOLUTION INTRAMUSCULAR at 00:10

## 2017-06-27 RX ADMIN — THERA TABS 1 TABLET: TAB at 08:42

## 2017-06-27 RX ADMIN — SODIUM CHLORIDE: 9 INJECTION, SOLUTION INTRAVENOUS at 22:30

## 2017-06-27 RX ADMIN — QUETIAPINE FUMARATE 25 MG: 25 TABLET ORAL at 08:42

## 2017-06-27 RX ADMIN — VANCOMYCIN HYDROCHLORIDE 125 MG: 10 INJECTION, POWDER, LYOPHILIZED, FOR SOLUTION INTRAVENOUS at 11:50

## 2017-06-27 RX ADMIN — VANCOMYCIN HYDROCHLORIDE 125 MG: 10 INJECTION, POWDER, LYOPHILIZED, FOR SOLUTION INTRAVENOUS at 17:28

## 2017-06-27 RX ADMIN — QUETIAPINE FUMARATE 25 MG: 25 TABLET ORAL at 22:17

## 2017-06-27 RX ADMIN — VANCOMYCIN HYDROCHLORIDE 125 MG: 10 INJECTION, POWDER, LYOPHILIZED, FOR SOLUTION INTRAVENOUS at 00:06

## 2017-06-27 RX ADMIN — DIVALPROEX SODIUM 125 MG: 125 CAPSULE, COATED PELLETS ORAL at 00:06

## 2017-06-27 RX ADMIN — SODIUM CHLORIDE: 9 INJECTION, SOLUTION INTRAVENOUS at 13:15

## 2017-06-27 ASSESSMENT — PAIN SCALES - GENERAL
PAINLEVEL_OUTOF10: 0

## 2017-06-27 NOTE — PROGRESS NOTES
[facility] [place]    Name Neymar Anderson     1945   Age/Sex 71 y.o. male   MRN [id number]   Code Status Full Code     After 5PM or if no immediate response to page, please call for cross-coverage  Attending/Team: Dr. Chester/Evangelista Call [phone] to page   1st Call - Day Intern (R1):   Dr. Newell 2nd Call - Day Sr. Resident (R2/R3):   Dr. Chopra         Chief complaint/ reason for interval visit (Primary Diagnosis)   Diarrhea, fever    Interval Problem Daily Status Update      Clostridium difficile- patient continues to have loose stool, did refuse his morning dose of vancomycin  Agitation- no requirements for haldol overnight, did need 3 point restraints due to removal of martini  Placement- [facility] to do on site evaluation of patient    ROS  Unable to perform ROS: dementia     Consultants/Specialty  Dr. Mejia/ID - Signed off  Dr. Ross/General Surgery - Signed off    Disposition  Inpatient, guarded    Core Measures  EKG reviewed, Labs reviewed, Medications reviewed and Radiology images reviewed  Martini catheter: No Martini  DVT prophylaxis - lovenox  Ulcer prophylaxis: No  Antibiotics: Treating active infection/contamination beyond 24 hours perioperative coverage      Physical Exam     Filed Vitals:    17 1528 17 1910 17 0350 17 0739   BP: 106/68 104/68 100/61 98/63   Pulse: 86 83 86 85   Temp: 36.7 °C (98 °F) 37 °C (98.6 °F) 36.8 °C (98.2 °F) 37.1 °C (98.7 °F)   Resp: 18 18 17 16   Height:       Weight:       SpO2: 96% 95% 95% 93%         Physical Exam  Constitutional:  Asleep this morning and somnolent, no distress  Head: Normocephalic and atraumatic.    Eyes: EOM are normal.      Cardiovascular: Normal rate, regular rhythm, normal heart sounds. No murmur heard.  Pulmonary/Chest: Effort normal and breath sounds normal. No respiratory distress. He has no wheezes. He has no rales.    Abdominal: Soft. Bowel sounds are normal. He  exhibits no distension and no mass. There is no rebound and no guarding.  - soaked gown secondary to urine incontinence, irritation around genital area   Musculoskeletal: On restraints at time of physical.   Neurological: Not oriented    Skin: Skin is warm. No erythema.    Lab Data Review:      Recent Labs      06/25/17 0339  06/27/17   0830   SODIUM  138  138   POTASSIUM  3.9  3.9   CHLORIDE  107  108   CO2  26  27   BUN  11  10   CREATININE  0.61  0.59   MAGNESIUM  1.6  1.7   CALCIUM  7.8*  8.0*       Recent Labs      06/25/17 0339  06/27/17   0830   GLUCOSE  101*  101*       Recent Labs      06/25/17 0339 06/26/17   0205  06/27/17   0156   RBC  4.13*  4.30*  4.17*   HEMOGLOBIN  12.2*  12.8*  12.3*   HEMATOCRIT  37.2*  38.3*  37.8*   PLATELETCT  274  279  302   FERRITIN   --   220.1   --        Recent Labs      06/25/17 0339 06/26/17   0205  06/27/17   0156   WBC  11.8*  10.5  12.5*   NEUTSPOLYS  74.60*  72.80*  75.90*   LYMPHOCYTES  14.40*  17.30*  13.90*   MONOCYTES  7.10  6.80  6.90   EOSINOPHILS  2.70  1.70  1.80   BASOPHILS  0.40  0.70  0.60           Assessment/Plan     C. difficile diarrhea  - Severe C.diff/NAP-1 strain positive  - Several day history of diarrhea with worsening mentation in setting of recent abx use  - CT Abd 6/16: Small bilateral pleural effusions with atelectasis versus consolidation. Changes compatible with severe colitis.  - Surgery recs: patient stable, continue medical management, signed off   - Patient has missed several doses of PO vanco due to agitation and refusal, DPOA made aware and agreed to insert a Cortrak if patient continues to refuse.   - WBC up to 12.5 today, afebrile, blood culture negative               Plan:              -Continue PO Vancomycin day 13/14 per ID, may require    reconsultation if continues and possible evaluation for stool  transplant              -Fluids continued              -Nystatin/triamcinolone for perineal irritation, zinc oxide  added              -Frequent contact with DPOA Jaquan and o/p care coordinatorElida                -PT recommended discharge to SNF. Social work in process of finding placement.    Late onset Alzheimer's disease with behavioral disturbance  -Home meds: seroquel 25mg bid, depakote 125mg qid  -Has required Haldol PRNs for acute agitation.              Plan:              -Continue depakote home dose, seroquel 25mg BID, will also add PRN seroquel, can try using this first before IV haldol              -DC ativan (made patient much worse), use Haldol PRN for agitation              -Discussed with DPOAs on importance of re-orientation of patient with familiar faces. Counseled caregiver at bedside to keep blinds  open for natural sunlight during daytime.    Incontinence dermatitis  -Patient with frequent urinary and fecal incontinence causing worsening incontinence dermatitis   -Nystatin/triamcinolone since admit  -Add zinc oxide for barrier protection  -Condom catheter as tolerated. Continues to pull it off.  -Rectal tube PRN

## 2017-06-27 NOTE — DISCHARGE PLANNING
Medical Social Work  PC from [facility] geriatric manager, called to say that Hernán Baker will be here tomorrow morning to reassess the patient.  [facility] stated she talked to patient's dr earlier this week, told him she does not believe patient will be out of restraints for the required time frame to be admitted into a skilled.  Recommended H/H through [facility].  Also Dr will need to document patient is not CDF contagious.

## 2017-06-27 NOTE — CARE PLAN
Problem: Safety  Goal: Will remain free from falls  Bed alarm in place, bed in low and locked position, non-slip socks on patient, hourly rounding in place, care giver at bedside, patient in three point restraints.     Problem: Skin Integrity  Goal: Risk for impaired skin integrity will decrease  Patient changed appropriately when linens soiled, attempts at placing a condom catheter unsuccessful, barrier cream applied, q2 turns in place.

## 2017-06-27 NOTE — CARE PLAN
Problem: Infection  Goal: Will remain free from infection  Outcome: PROGRESSING AS EXPECTED  Oral Vanco administered per MAR. Pt remains on special contact isolation     Problem: Discharge Barriers/Planning  Goal: Patient’s continuum of care needs will be met  Outcome: PROGRESSING AS EXPECTED  Possible discharge back to [facility] once C. Diff results are negative    Problem: Skin Integrity  Goal: Risk for impaired skin integrity will decrease  Outcome: PROGRESSING AS EXPECTED  Linens changed when soiled, barrier cream applied, skin checked under restraints Q 2 hours

## 2017-06-27 NOTE — PROGRESS NOTES
"Assumed care of pt. When answering orientation questions, pt yells \" I don't know the answer to any of these, stop asking me.\" Pt able to take oral medications in apple sauce. NS running at 100 ml/hr, pt continuing to have incontinent loose bowel movements, linens changed when soiled. Bilateral wrist restraints and left foot restraint in use. Pt on room air, tolerating regular/ dysphagia 2 diet. Care giver at bedside. Possible discharge back to [place] once C. Diff negative. Bed alarm on, bed in lowest position, personal belongings and call light within reach, instructed to call for any assistance   "

## 2017-06-28 LAB
BASOPHILS # BLD AUTO: 0.5 % (ref 0–1.8)
BASOPHILS # BLD: 0.08 K/UL (ref 0–0.12)
C DIFF DNA SPEC QL NAA+PROBE: POSITIVE
C DIFF TOX A+B STL QL IA: NEGATIVE
C DIFF TOX GENS STL QL NAA+PROBE: ABNORMAL
EOSINOPHIL # BLD AUTO: 0.2 K/UL (ref 0–0.51)
EOSINOPHIL NFR BLD: 1.3 % (ref 0–6.9)
ERYTHROCYTE [DISTWIDTH] IN BLOOD BY AUTOMATED COUNT: 50.7 FL (ref 35.9–50)
HCT VFR BLD AUTO: 39.2 % (ref 42–52)
HGB BLD-MCNC: 13 G/DL (ref 14–18)
IMM GRANULOCYTES # BLD AUTO: 0.06 K/UL (ref 0–0.11)
IMM GRANULOCYTES NFR BLD AUTO: 0.4 % (ref 0–0.9)
LYMPHOCYTES # BLD AUTO: 1.91 K/UL (ref 1–4.8)
LYMPHOCYTES NFR BLD: 12.3 % (ref 22–41)
MCH RBC QN AUTO: 29.7 PG (ref 27–33)
MCHC RBC AUTO-ENTMCNC: 33.2 G/DL (ref 33.7–35.3)
MCV RBC AUTO: 89.5 FL (ref 81.4–97.8)
MONOCYTES # BLD AUTO: 0.84 K/UL (ref 0–0.85)
MONOCYTES NFR BLD AUTO: 5.4 % (ref 0–13.4)
NEUTROPHILS # BLD AUTO: 12.38 K/UL (ref 1.82–7.42)
NEUTROPHILS NFR BLD: 80.1 % (ref 44–72)
NRBC # BLD AUTO: 0 K/UL
NRBC BLD AUTO-RTO: 0 /100 WBC
PLATELET # BLD AUTO: 306 K/UL (ref 164–446)
PMV BLD AUTO: 10 FL (ref 9–12.9)
RBC # BLD AUTO: 4.38 M/UL (ref 4.7–6.1)
WBC # BLD AUTO: 15.5 K/UL (ref 4.8–10.8)

## 2017-06-28 PROCEDURE — 700102 HCHG RX REV CODE 250 W/ 637 OVERRIDE(OP): Performed by: STUDENT IN AN ORGANIZED HEALTH CARE EDUCATION/TRAINING PROGRAM

## 2017-06-28 PROCEDURE — 87493 C DIFF AMPLIFIED PROBE: CPT

## 2017-06-28 PROCEDURE — A9270 NON-COVERED ITEM OR SERVICE: HCPCS | Performed by: INTERNAL MEDICINE

## 2017-06-28 PROCEDURE — 99232 SBSQ HOSP IP/OBS MODERATE 35: CPT | Performed by: INTERNAL MEDICINE

## 2017-06-28 PROCEDURE — 770021 HCHG ROOM/CARE - ISO PRIVATE

## 2017-06-28 PROCEDURE — 87324 CLOSTRIDIUM AG IA: CPT

## 2017-06-28 PROCEDURE — 700102 HCHG RX REV CODE 250 W/ 637 OVERRIDE(OP): Performed by: INTERNAL MEDICINE

## 2017-06-28 PROCEDURE — 36415 COLL VENOUS BLD VENIPUNCTURE: CPT

## 2017-06-28 PROCEDURE — 700105 HCHG RX REV CODE 258: Performed by: INTERNAL MEDICINE

## 2017-06-28 PROCEDURE — 700111 HCHG RX REV CODE 636 W/ 250 OVERRIDE (IP): Performed by: STUDENT IN AN ORGANIZED HEALTH CARE EDUCATION/TRAINING PROGRAM

## 2017-06-28 PROCEDURE — 85025 COMPLETE CBC W/AUTO DIFF WBC: CPT

## 2017-06-28 RX ADMIN — DIVALPROEX SODIUM 125 MG: 125 CAPSULE, COATED PELLETS ORAL at 23:10

## 2017-06-28 RX ADMIN — ENOXAPARIN SODIUM 40 MG: 100 INJECTION SUBCUTANEOUS at 09:04

## 2017-06-28 RX ADMIN — THERA TABS 1 TABLET: TAB at 09:05

## 2017-06-28 RX ADMIN — SODIUM CHLORIDE: 9 INJECTION, SOLUTION INTRAVENOUS at 21:44

## 2017-06-28 RX ADMIN — SODIUM CHLORIDE: 9 INJECTION, SOLUTION INTRAVENOUS at 06:11

## 2017-06-28 RX ADMIN — VANCOMYCIN HYDROCHLORIDE 125 MG: 10 INJECTION, POWDER, LYOPHILIZED, FOR SOLUTION INTRAVENOUS at 13:18

## 2017-06-28 RX ADMIN — DIVALPROEX SODIUM 125 MG: 125 CAPSULE, COATED PELLETS ORAL at 13:18

## 2017-06-28 RX ADMIN — TAMSULOSIN HYDROCHLORIDE 0.8 MG: 0.4 CAPSULE ORAL at 21:57

## 2017-06-28 RX ADMIN — VANCOMYCIN HYDROCHLORIDE 125 MG: 10 INJECTION, POWDER, LYOPHILIZED, FOR SOLUTION INTRAVENOUS at 05:55

## 2017-06-28 RX ADMIN — QUETIAPINE FUMARATE 25 MG: 25 TABLET ORAL at 21:57

## 2017-06-28 RX ADMIN — DIVALPROEX SODIUM 125 MG: 125 CAPSULE, COATED PELLETS ORAL at 05:55

## 2017-06-28 ASSESSMENT — PAIN SCALES - GENERAL: PAINLEVEL_OUTOF10: 0

## 2017-06-28 NOTE — PROGRESS NOTES
Received report from night shift RN, assumed care. Pt. Is awake, on bed. A&Ox1-2. x2 assist with turning and repositioning. Pt. denies pain, appears comfortable, making jokes with this RN. On continuous IV infusion at 100 cc/hr , tolerating well. Plan of care was safety, comfort and rest. Discussed plan of care. Call light and personal belongings within reach, bed kept low, treaded socks on. Assisted as necessary. Kept rested and comfortable at all times.    Caregiver at bedside at all times.  Bilateral wrist and L leg restraints in use. CMS within normal limits. Skin kept clean and dry at all times, Q2 turning and place, toileted.

## 2017-06-28 NOTE — THERAPY
Attempted PT treatment session this pm. Per CNA, pt is eager to walk. Upon entry, pt sleeping but easily roused and agreeable to ambulate. When getting equipment ready and getting new gown for pt, he fell back asleep. Changed gown  and brought pt's LE's to EOB. Pt would not rouse despite verbal and tactile cues. Pt extremely stiff and resistant with attempts to aid pt to sitting at EOB. Will attempt PT treatment session as able.

## 2017-06-28 NOTE — PROGRESS NOTES
[facility] [place]    Name Neymar Anderson     1945   Age/Sex 71 y.o. male   MRN [id number]   Code Status Full Code     After 5PM or if no immediate response to page, please call for cross-coverage  Attending/Team: Dr. Chester/Evangelista Call [phone] to page   1st Call - Day Intern (R1):   Dr. Newell 2nd Call - Day Sr. Resident (R2/R3):   Dr. Chopra         Chief complaint/ reason for interval visit (Primary Diagnosis)   Diarrhea, fever    Interval Problem Daily Status Update    C. Diff diarrhea: 2 BM's overnight, continues to refuse a dose every now and again, evaluated by [facility] today, discussed case with care coordinator, [facility] and patient can be discharged back there as long as it is documented that he is not infectious, discussed case with infectious disease physician who stated it was ok to stop antibiotics and observe, can have diarrhea for weeks after C. Diff reslves according to ID physician, repeat C. Diff PCR pending.    ROS  Unable to perform ROS: dementia     Consultants/Specialty  Dr. Mejia/ID - Signed off  Dr. Ross/General Surgery - Signed off    Disposition  Inpatient, guarded    Core Measures  EKG reviewed, Labs reviewed, Medications reviewed and Radiology images reviewed  Holland catheter: No Holland  DVT prophylaxis - mechanical: SCDs  Ulcer prophylaxis: No  Antibiotics: Treating active infection/contamination beyond 24 hours perioperative coverage      Physical Exam       Filed Vitals:    17 0334 17 0710 17 0718 17 0827   BP: 109/66 84/52 85/48 96/58   Pulse: 94 84     Temp: 37.1 °C (98.7 °F) 36.3 °C (97.4 °F)     Resp: 18 18     Height:       Weight:       SpO2: 93% 95%       Body mass index is 21.32 kg/(m^2).    Oxygen Therapy:  Pulse Oximetry: 95 %, O2 (LPM): 0, O2 Delivery: None (Room Air)    Physical Exam  Constitutional:  Alert, disoriented, confused, resting in bed this AM, easily roused, in no acute  distress  Head: Normocephalic and atraumatic.    Eyes: EOM are normal.      Cardiovascular: Normal rate, regular rhythm, normal heart sounds. No murmur heard.  Pulmonary/Chest: Effort normal and breath sounds normal. No respiratory distress. He has no wheezes. He has no rales.    Abdominal: Soft. Bowel sounds are normal. He exhibits no distension and no mass. There is no rebound and no guarding.   Musculoskeletal: Normal range of motion. He exhibits no edema.   Neurological: Not oriented    Skin: Skin is warm. No erythema.    Lab Data Review:      6/24/2017  11:24 AM    Recent Labs      06/27/17   0830   SODIUM  138   POTASSIUM  3.9   CHLORIDE  108   CO2  27   BUN  10   CREATININE  0.59   MAGNESIUM  1.7   CALCIUM  8.0*       Recent Labs      06/27/17   0830   GLUCOSE  101*       Recent Labs      06/26/17   0205  06/27/17   0156  06/28/17   0804   RBC  4.30*  4.17*  4.38*   HEMOGLOBIN  12.8*  12.3*  13.0*   HEMATOCRIT  38.3*  37.8*  39.2*   PLATELETCT  279  302  306   FERRITIN  220.1   --    --        Recent Labs      06/26/17   0205  06/27/17   0156  06/28/17   0804   WBC  10.5  12.5*  15.5*   NEUTSPOLYS  72.80*  75.90*  80.10*   LYMPHOCYTES  17.30*  13.90*  12.30*   MONOCYTES  6.80  6.90  5.40   EOSINOPHILS  1.70  1.80  1.30   BASOPHILS  0.70  0.60  0.50           Assessment/Plan     C. difficile diarrhea  - Severe C.diff/NAP-1 strain positive  - Several day history of diarrhea with worsening mentation in setting of recent abx use  - Leukocytosis on admission, resolved  - Blood Cx: No growth at 5 days  - CT Abd 6/16: Small bilateral pleural effusions with atelectasis versus consolidation. Changes compatible with severe colitis.  - KUB 6/17 on admission: mildly dilated air-filled loops of small bowel, ileus vs. Intermittent or partial obstruction.    - KUB 6/19: Diffuse air-filled distention of small bowel, appearance suggests ileus or enteritis  - Surgery recs: patient stable, continue medical management, signed  off  - ID recs: D/C IV metronidazole, continue PO vanco for total of 14 days, signed off    - Patient has missed several doses of PO vanco due to agitation and refusal, DPOA made aware and agreed to insert a Cortrak if patient continues to refuse. Still refusing a dose every now and again. If he refuses any more doses will place a cortrak (already ordered) as he cannot miss any more doses.  - Abdominal exam benign, clinically stable but condition remains guarded as this is a severe strain.    - Passed 4 loose mucous stools overnight. Metronidazole can cause diarrhea. Will D/C metronidazole.  - Restarted IV metronidazole after fever spike on 6/19. D/C'ed again on 6/25.              Plan:              -Finished 14 day course of PO vanc. ID stated ok to stop vanc and observe              -continue fluids              -Nystatin/triamcinolone for perineal irritation, zinc oxide added              -Frequent contact with DPOA Jaquan and o/p care coordinatorElida                -Accepted back at [facility] as long as he is not infectious   -Redraw C. Diff PCR today. Likely discharge soon.    Late onset Alzheimer's disease with behavioral disturbance  -Home meds: seroquel 25mg bid, depakote 125mg qid  -Confused and altered on admission, was slowly improving but patient is has continued intermittent aggression, agitation, uncooperative. -Has required Haldol PRNs for acute agitation.              Plan:              -Continue depakote home dose, seroquel 25mg BID, will also add PRN seroquel, can try using this first before IV haldol              -DC ativan (made patient much worse), use Haldol PRN for agitation              -Discussed with DPOAs on importance of re-orientation of patient with familiar faces. Counseled caregiver at bedside to keep blinds  open for natural sunlight during daytime.    Incontinence dermatitis  -Patient with frequent urinary and fecal incontinence causing worsening incontinence dermatitis    -Nystatin/triamcinolone since admit  -Add zinc oxide for barrier protection  -Condom catheter as tolerated. Continues to pull it off.  -Rectal tube PRN

## 2017-06-28 NOTE — CARE PLAN
Problem: Safety  Goal: Will remain free from injury  Outcome: PROGRESSING AS EXPECTED  Pt. With bilateral wrist and L leg restraints, CMS within normal limits.     Problem: Skin Integrity  Goal: Risk for impaired skin integrity will decrease  Outcome: PROGRESSING AS EXPECTED  Pt. Incontinent.

## 2017-06-28 NOTE — PROGRESS NOTES
Patient is alert and oriented to self. On Special isolation precaution for C.Diff. Soft restraints on left wrist, right wrist and left leg noted. CMS checked. Caregiver at bedside at all times. Assessment completed. Denies any pain and discomfort at this time. No additional needs at this time. Call light within reach.

## 2017-06-28 NOTE — PROGRESS NOTES
Pt. With BP of 85/48, VT of 84, RR of 18, Afebrile. Pt. A&Ox1, responds to verbal stimuli appropriately. Paged [facility] resident and received call back from MD Newell. Reported pt's latest BP. No new orders received.

## 2017-06-28 NOTE — PROGRESS NOTES
Received call from Microbiology. Pt. Tested positive for Cdiff. Per Digna, Toxin will be available later tonight. Paged MD Newell and relayed results. No new orders received.

## 2017-06-28 NOTE — CARE PLAN
Problem: Safety  Goal: Will remain free from injury  Outcome: PROGRESSING AS EXPECTED  Safety precautions in place. Caregiver at bedside at all times. Call light within reach. Bi hourly rounding in place.    Problem: Skin Integrity  Goal: Risk for impaired skin integrity will decrease  Outcome: PROGRESSING AS EXPECTED  Patient repositioned q 2 hours. Kept clean and dry.

## 2017-06-29 PROBLEM — I48.20 CHRONIC ATRIAL FIBRILLATION (HCC): Status: ACTIVE | Noted: 2017-06-29

## 2017-06-29 PROBLEM — R07.82 INTERCOSTAL PAIN: Status: ACTIVE | Noted: 2017-06-29

## 2017-06-29 PROBLEM — R07.82 INTERCOSTAL PAIN: Chronic | Status: ACTIVE | Noted: 2017-06-29

## 2017-06-29 PROBLEM — I20.89 ANGINA AT REST (HCC): Status: ACTIVE | Noted: 2017-06-29

## 2017-06-29 LAB
ANION GAP SERPL CALC-SCNC: 5 MMOL/L (ref 0–11.9)
BASOPHILS # BLD AUTO: 0.5 % (ref 0–1.8)
BASOPHILS # BLD: 0.07 K/UL (ref 0–0.12)
BUN SERPL-MCNC: 9 MG/DL (ref 8–22)
CALCIUM SERPL-MCNC: 8.2 MG/DL (ref 8.5–10.5)
CHLORIDE SERPL-SCNC: 104 MMOL/L (ref 96–112)
CO2 SERPL-SCNC: 27 MMOL/L (ref 20–33)
CREAT SERPL-MCNC: 0.6 MG/DL (ref 0.5–1.4)
EOSINOPHIL # BLD AUTO: 0.18 K/UL (ref 0–0.51)
EOSINOPHIL NFR BLD: 1.2 % (ref 0–6.9)
ERYTHROCYTE [DISTWIDTH] IN BLOOD BY AUTOMATED COUNT: 50.3 FL (ref 35.9–50)
GFR SERPL CREATININE-BSD FRML MDRD: >60 ML/MIN/1.73 M 2
GLUCOSE SERPL-MCNC: 96 MG/DL (ref 65–99)
HCT VFR BLD AUTO: 40.4 % (ref 42–52)
HGB BLD-MCNC: 13.4 G/DL (ref 14–18)
IMM GRANULOCYTES # BLD AUTO: 0.07 K/UL (ref 0–0.11)
IMM GRANULOCYTES NFR BLD AUTO: 0.5 % (ref 0–0.9)
LYMPHOCYTES # BLD AUTO: 2.06 K/UL (ref 1–4.8)
LYMPHOCYTES NFR BLD: 14 % (ref 22–41)
MCH RBC QN AUTO: 29.8 PG (ref 27–33)
MCHC RBC AUTO-ENTMCNC: 33.2 G/DL (ref 33.7–35.3)
MCV RBC AUTO: 89.8 FL (ref 81.4–97.8)
MONOCYTES # BLD AUTO: 1.06 K/UL (ref 0–0.85)
MONOCYTES NFR BLD AUTO: 7.2 % (ref 0–13.4)
NEUTROPHILS # BLD AUTO: 11.25 K/UL (ref 1.82–7.42)
NEUTROPHILS NFR BLD: 76.6 % (ref 44–72)
NRBC # BLD AUTO: 0 K/UL
NRBC BLD AUTO-RTO: 0 /100 WBC
PLATELET # BLD AUTO: 311 K/UL (ref 164–446)
PMV BLD AUTO: 9.9 FL (ref 9–12.9)
POTASSIUM SERPL-SCNC: 3.8 MMOL/L (ref 3.6–5.5)
RBC # BLD AUTO: 4.5 M/UL (ref 4.7–6.1)
SODIUM SERPL-SCNC: 136 MMOL/L (ref 135–145)
WBC # BLD AUTO: 14.7 K/UL (ref 4.8–10.8)

## 2017-06-29 PROCEDURE — 99232 SBSQ HOSP IP/OBS MODERATE 35: CPT | Performed by: INTERNAL MEDICINE

## 2017-06-29 PROCEDURE — 770021 HCHG ROOM/CARE - ISO PRIVATE

## 2017-06-29 PROCEDURE — A9270 NON-COVERED ITEM OR SERVICE: HCPCS | Performed by: INTERNAL MEDICINE

## 2017-06-29 PROCEDURE — 700102 HCHG RX REV CODE 250 W/ 637 OVERRIDE(OP): Performed by: INTERNAL MEDICINE

## 2017-06-29 PROCEDURE — 92526 ORAL FUNCTION THERAPY: CPT

## 2017-06-29 PROCEDURE — 700102 HCHG RX REV CODE 250 W/ 637 OVERRIDE(OP): Performed by: STUDENT IN AN ORGANIZED HEALTH CARE EDUCATION/TRAINING PROGRAM

## 2017-06-29 PROCEDURE — 36415 COLL VENOUS BLD VENIPUNCTURE: CPT

## 2017-06-29 PROCEDURE — 51798 US URINE CAPACITY MEASURE: CPT

## 2017-06-29 PROCEDURE — 85025 COMPLETE CBC W/AUTO DIFF WBC: CPT

## 2017-06-29 PROCEDURE — 80048 BASIC METABOLIC PNL TOTAL CA: CPT

## 2017-06-29 PROCEDURE — 700111 HCHG RX REV CODE 636 W/ 250 OVERRIDE (IP): Performed by: STUDENT IN AN ORGANIZED HEALTH CARE EDUCATION/TRAINING PROGRAM

## 2017-06-29 RX ADMIN — QUETIAPINE FUMARATE 25 MG: 25 TABLET ORAL at 22:12

## 2017-06-29 RX ADMIN — THERA TABS 1 TABLET: TAB at 10:27

## 2017-06-29 RX ADMIN — TAMSULOSIN HYDROCHLORIDE 0.8 MG: 0.4 CAPSULE ORAL at 22:12

## 2017-06-29 RX ADMIN — QUETIAPINE FUMARATE 25 MG: 25 TABLET ORAL at 10:26

## 2017-06-29 RX ADMIN — DIVALPROEX SODIUM 125 MG: 125 CAPSULE, COATED PELLETS ORAL at 17:37

## 2017-06-29 RX ADMIN — DIVALPROEX SODIUM 125 MG: 125 CAPSULE, COATED PELLETS ORAL at 10:37

## 2017-06-29 NOTE — DISCHARGE PLANNING
Medical Social Work  PC to [phone], he will take him back to [facility].  Requesting we call him when  d/c is in the computer.

## 2017-06-29 NOTE — PROGRESS NOTES
"                               [facility] [place] [facility] [place]val [place]    Name Neymar Anderson     1945   Age/Sex 71 y.o. male   MRN [id number]   Code Status Full Code     After 5PM or if no immediate response to page, please call for cross-coverage  Attending/Team: Dr. Chester/Evangelista Call [phone] to page   1st Call - Day Intern (R1):   Dr. Newell 2nd Call - Day Sr. Resident (R2/R3):   Dr. Chopra         Chief complaint/ reason for interval visit (Primary Diagnosis)   Diarrhea, fever    Interval Problem Daily Status Update    C. Diff diarrhea: 3 stools overnight, PO vanc D/C'ed yesterday, ID reassessed case today with updated rec's, discussed with outpatient , [facility], and also spoke with [facility] and they are agreeable to re-accept patient.    ROS  Unable to perform ROS: dementia     Consultants/Specialty  Dr. Mejia/ID  Dr. Ross/General Surgery - Signed off    Disposition  Inpatient, guarded    Core Measures  EKG reviewed, Labs reviewed, Medications reviewed and Radiology images reviewed  Holland catheter: No Holland  DVT prophylaxis - mechanical: SCDs  Ulcer prophylaxis: No  Antibiotics: Treating active infection/contamination beyond 24 hours perioperative coverage      Physical Exam       Filed Vitals:    17 1929 17 0340 17 0712 17 1000   BP: 121/71 128/77 107/59    Pulse: 94 74 89    Temp: 37.3 °C (99.2 °F) 37 °C (98.6 °F) 36.6 °C (97.8 °F)    Resp:  17 18    Height:    1.727 m (5' 8\")   Weight:    57.6 kg (126 lb 15.8 oz)   SpO2: 93% 93% 97%      Body mass index is 19.31 kg/(m^2). Weight: 57.6 kg (126 lb 15.8 oz)  Oxygen Therapy:  Pulse Oximetry: 97 %, O2 (LPM): 0, O2 Delivery: None (Room Air)    Physical Exam  Constitutional:  Alert, disoriented, confused, resting in bed this AM, easily roused, in no acute distress  Head: Normocephalic and atraumatic.    Eyes: EOM are normal.      Cardiovascular: Normal rate, regular rhythm, normal heart sounds. No murmur " heard.  Pulmonary/Chest: Effort normal and breath sounds normal. No respiratory distress. He has no wheezes. He has no rales.    Abdominal: Soft. Bowel sounds are normal. He exhibits no distension and no mass. There is no rebound and no guarding.   Musculoskeletal: Normal range of motion. He exhibits no edema.   Neurological: Not oriented    Skin: Skin is warm. No erythema.    Lab Data Review:      6/24/2017  11:24 AM    Recent Labs      06/27/17   0830  06/29/17   0145   SODIUM  138  136   POTASSIUM  3.9  3.8   CHLORIDE  108  104   CO2  27 27   BUN  10  9   CREATININE  0.59  0.60   MAGNESIUM  1.7   --    CALCIUM  8.0*  8.2*       Recent Labs      06/27/17   0830  06/29/17   0145   GLUCOSE  101*  96       Recent Labs      06/27/17   0156  06/28/17   0804  06/29/17   0145   RBC  4.17*  4.38*  4.50*   HEMOGLOBIN  12.3*  13.0*  13.4*   HEMATOCRIT  37.8*  39.2*  40.4*   PLATELETCT  302  306  311       Recent Labs      06/27/17   0156  06/28/17   0804  06/29/17   0145   WBC  12.5*  15.5*  14.7*   NEUTSPOLYS  75.90*  80.10*  76.60*   LYMPHOCYTES  13.90*  12.30*  14.00*   MONOCYTES  6.90  5.40  7.20   EOSINOPHILS  1.80  1.30  1.20   BASOPHILS  0.60  0.50  0.50           Assessment/Plan     C. difficile diarrhea  - Severe C.diff/NAP-1 strain positive  - Several day history of diarrhea with worsening mentation in setting of recent abx use  - Leukocytosis on admission, resolved  - Blood Cx: No growth at 5 days  - CT Abd 6/16: Small bilateral pleural effusions with atelectasis versus consolidation. Changes compatible with severe colitis.  - KUB 6/17 on admission: mildly dilated air-filled loops of small bowel, ileus vs. Intermittent or partial obstruction.    - KUB 6/19: Diffuse air-filled distention of small bowel, appearance suggests ileus or enteritis  - Surgery recs: patient stable, continue medical management, signed off  - ID recs: D/C IV metronidazole, continue PO vanco for total of 14 days, signed off    - Patient  missed several doses of PO vanco due to agitation and refusal    - Restarted IV metronidazole after fever spike on 6/19. D/C'ed again on 6/25.              Plan:              -Finished 14 day course of PO vanc 6/28. ID stated ok to stop vanc and observe.              -D/C fluids              -Nystatin/triamcinolone for perineal irritation, zinc oxide added              -Frequent contact with DPOA Jaquan and o/p care coordinatorElida                -Accepted back at [facility]. Discussed case with them. Likely D/C back there soon.    Late onset Alzheimer's disease with behavioral disturbance  -Home meds: seroquel 25mg bid, depakote 125mg qid  -Confused and altered on admission, was slowly improving but patient is has continued intermittent aggression, agitation, uncooperative. -Has required Haldol PRNs for acute agitation.              Plan:              -Continue depakote home dose, seroquel 25mg BID, will also add PRN seroquel, can try using this first before IV haldol              -DC ativan (made patient much worse), use Haldol PRN for agitation              -Discussed with DPOAs on importance of re-orientation of patient with familiar faces. Counseled caregiver at bedside to keep blinds  open for natural sunlight during daytime.    Incontinence dermatitis  -Patient with frequent urinary and fecal incontinence causing worsening incontinence dermatitis   -Nystatin/triamcinolone since admit  -Add zinc oxide for barrier protection  -Condom catheter as tolerated. Continues to pull it off.  -Rectal tube PRN

## 2017-06-29 NOTE — DISCHARGE PLANNING
Medical Social Work  PC from [facility]; called to tell me they can take the patient back at [facility] when he is ready to be d/c.  Would like documentation that the CDF is not active.  Also requesting orders for a wheel chair, walker and a Hospital Bed.  Requested I fax requested information to [facility].    [facility]  [address] [facility] [address] [place] [phone]    Paged [facility] Arvind

## 2017-06-29 NOTE — CARE PLAN
Problem: Bowel/Gastric:  Goal: Normal bowel function is maintained or improved  Outcome: PROGRESSING AS EXPECTED  Patient having loose BMs, only x1 this shift.     Problem: Urinary Elimination:  Goal: Ability to reestablish a normal urinary elimination pattern will improve  Outcome: PROGRESSING AS EXPECTED  Patient incontinent, large output.

## 2017-06-29 NOTE — PROGRESS NOTES
Pt A&O x1, no apparent pain visible. Still in 3 pt restraints, CMS intact. Turning Q 2hrs. 2-3 loose BMs this shift. Sacral region discolored and excoriated, but blanching well.    Caretaker at bedside, helps w/ changing pt occasionally. Took night medications well, but refused to wake to take depakote at midnight and six.    Call light w/ caretake, treaded socks on, appropriate signs at doorway, hourly rounding continued.

## 2017-06-29 NOTE — THERAPY
"Speech Language Therapy dysphagia treatment completed.   Functional Status:  Patient seen for dysphagia therapy on this date with 1:1 sitter at bedside.  He consumed bites/sips from his dysphagia 2/nectars tray and PO trials of thin liquids.  The patient presented with mild oral residue but cleared with a liquid wash.  Initiation of swallow trigger was timely and laryngeal excursion was palpated as complete.  The patient was impulsive and took large gulps of nectars, despite cues.  With thin liquids, the patient had consistent throat clearing but no overt s/sx of aspiration with any other consistency consumed.  He did not follow directives to dysphagia exercises and stated, \"That's all right.\"    Recommendations: Continue Dysphagia 2/nectars diet with Direct supervision during PO intake.  SLP following during acute care.     Plan of Care: Will benefit from Speech Therapy 3 times per week  Post-Acute Therapy: Discharge to a transitional care facility for continued skilled therapy services.    See \"Rehab Therapy-Acute\" Patient Summary Report for complete documentation.     "

## 2017-06-29 NOTE — PROGRESS NOTES
C. Diff is a clinical diagnosis, not a lab test result.  No role for end of treatment C. Diff stool test  Majority of patients successfully treated cont to carry C. Diff in their stool. This is not an indication of treatment failure  Or need for further therapy.   Many cont to have loose stools for weeks or  Months after successful treatment- fiber   Supplements and healthy diet can help

## 2017-06-30 VITALS
DIASTOLIC BLOOD PRESSURE: 51 MMHG | SYSTOLIC BLOOD PRESSURE: 113 MMHG | WEIGHT: 126.98 LBS | OXYGEN SATURATION: 96 % | TEMPERATURE: 97.7 F | RESPIRATION RATE: 20 BRPM | BODY MASS INDEX: 19.25 KG/M2 | HEART RATE: 86 BPM | HEIGHT: 68 IN

## 2017-06-30 LAB
ANION GAP SERPL CALC-SCNC: 4 MMOL/L (ref 0–11.9)
BASOPHILS # BLD AUTO: 0.7 % (ref 0–1.8)
BASOPHILS # BLD: 0.1 K/UL (ref 0–0.12)
BUN SERPL-MCNC: 15 MG/DL (ref 8–22)
CALCIUM SERPL-MCNC: 8.5 MG/DL (ref 8.5–10.5)
CHLORIDE SERPL-SCNC: 105 MMOL/L (ref 96–112)
CO2 SERPL-SCNC: 29 MMOL/L (ref 20–33)
CREAT SERPL-MCNC: 0.78 MG/DL (ref 0.5–1.4)
EOSINOPHIL # BLD AUTO: 0.25 K/UL (ref 0–0.51)
EOSINOPHIL NFR BLD: 1.9 % (ref 0–6.9)
ERYTHROCYTE [DISTWIDTH] IN BLOOD BY AUTOMATED COUNT: 52.2 FL (ref 35.9–50)
GFR SERPL CREATININE-BSD FRML MDRD: >60 ML/MIN/1.73 M 2
GLUCOSE SERPL-MCNC: 97 MG/DL (ref 65–99)
HCT VFR BLD AUTO: 41 % (ref 42–52)
HGB BLD-MCNC: 13.1 G/DL (ref 14–18)
IMM GRANULOCYTES # BLD AUTO: 0.06 K/UL (ref 0–0.11)
IMM GRANULOCYTES NFR BLD AUTO: 0.4 % (ref 0–0.9)
LYMPHOCYTES # BLD AUTO: 1.99 K/UL (ref 1–4.8)
LYMPHOCYTES NFR BLD: 14.7 % (ref 22–41)
MAGNESIUM SERPL-MCNC: 1.8 MG/DL (ref 1.5–2.5)
MCH RBC QN AUTO: 29.2 PG (ref 27–33)
MCHC RBC AUTO-ENTMCNC: 32 G/DL (ref 33.7–35.3)
MCV RBC AUTO: 91.3 FL (ref 81.4–97.8)
MONOCYTES # BLD AUTO: 1.06 K/UL (ref 0–0.85)
MONOCYTES NFR BLD AUTO: 7.8 % (ref 0–13.4)
NEUTROPHILS # BLD AUTO: 10.05 K/UL (ref 1.82–7.42)
NEUTROPHILS NFR BLD: 74.5 % (ref 44–72)
NRBC # BLD AUTO: 0 K/UL
NRBC BLD AUTO-RTO: 0 /100 WBC
PLATELET # BLD AUTO: 299 K/UL (ref 164–446)
PMV BLD AUTO: 10.3 FL (ref 9–12.9)
POTASSIUM SERPL-SCNC: 4.3 MMOL/L (ref 3.6–5.5)
RBC # BLD AUTO: 4.49 M/UL (ref 4.7–6.1)
SODIUM SERPL-SCNC: 138 MMOL/L (ref 135–145)
WBC # BLD AUTO: 13.5 K/UL (ref 4.8–10.8)

## 2017-06-30 PROCEDURE — 700111 HCHG RX REV CODE 636 W/ 250 OVERRIDE (IP): Performed by: STUDENT IN AN ORGANIZED HEALTH CARE EDUCATION/TRAINING PROGRAM

## 2017-06-30 PROCEDURE — 83735 ASSAY OF MAGNESIUM: CPT

## 2017-06-30 PROCEDURE — A9270 NON-COVERED ITEM OR SERVICE: HCPCS | Performed by: INTERNAL MEDICINE

## 2017-06-30 PROCEDURE — 700102 HCHG RX REV CODE 250 W/ 637 OVERRIDE(OP): Performed by: INTERNAL MEDICINE

## 2017-06-30 PROCEDURE — 36415 COLL VENOUS BLD VENIPUNCTURE: CPT

## 2017-06-30 PROCEDURE — 700102 HCHG RX REV CODE 250 W/ 637 OVERRIDE(OP): Performed by: STUDENT IN AN ORGANIZED HEALTH CARE EDUCATION/TRAINING PROGRAM

## 2017-06-30 PROCEDURE — 80048 BASIC METABOLIC PNL TOTAL CA: CPT

## 2017-06-30 PROCEDURE — 85025 COMPLETE CBC W/AUTO DIFF WBC: CPT

## 2017-06-30 PROCEDURE — 99239 HOSP IP/OBS DSCHRG MGMT >30: CPT | Performed by: INTERNAL MEDICINE

## 2017-06-30 RX ADMIN — QUETIAPINE FUMARATE 25 MG: 25 TABLET ORAL at 09:49

## 2017-06-30 RX ADMIN — THERA TABS 1 TABLET: TAB at 09:49

## 2017-06-30 RX ADMIN — ENOXAPARIN SODIUM 40 MG: 100 INJECTION SUBCUTANEOUS at 09:50

## 2017-06-30 RX ADMIN — DIVALPROEX SODIUM 125 MG: 125 CAPSULE, COATED PELLETS ORAL at 14:23

## 2017-06-30 NOTE — DISCHARGE PLANNING
Received choice form from [facility](JAUN), however, patient needs home health order. [facility]) notified. Home health order pending.

## 2017-06-30 NOTE — DISCHARGE INSTRUCTIONS
Discharge Instructions    Discharged to group home by ambulance with escort. Discharged via ambulance, hospital escort: Refused.  Special equipment needed: none      Be sure to schedule a follow-up appointment with your primary care doctor or any specialists as instructed.     Discharge Plan:   Diet Plan: Discussed  Activity Level: Discussed  Confirmed Symptoms Management: Discussed  Medication Reconciliation Updated: Yes  Influenza Vaccine Indication: Not indicated: Previously immunized this influenza season and > 8 years of age    I understand that a diet low in cholesterol, fat, and sodium is recommended for good health. Unless I have been given specific instructions below for another diet, I accept this instruction as my diet prescription.   Other diet: reg.    Special Instructions: None    · Is patient discharged on Warfarin / Coumadin?   No     · Is patient Post Blood Transfusion?  No    Depression / Suicide Risk    As you are discharged from this [facility] facility, it is important to learn how to keep safe from harming yourself.    Recognize the warning signs:  · Abrupt changes in personality, positive or negative- including increase in energy   · Giving away possessions  · Change in eating patterns- significant weight changes-  positive or negative  · Change in sleeping patterns- unable to sleep or sleeping all the time   · Unwillingness or inability to communicate  · Depression  · Unusual sadness, discouragement and loneliness  · Talk of wanting to die  · Neglect of personal appearance   · Rebelliousness- reckless behavior  · Withdrawal from people/activities they love  · Confusion- inability to concentrate     If you or a loved one observes any of these behaviors or has concerns about self-harm, here's what you can do:  · Talk about it- your feelings and reasons for harming yourself  · Remove any means that you might use to hurt yourself (examples: pills, rope, extension cords, firearm)  · Get  professional help from the community (Mental Health, Substance Abuse, psychological counseling)  · Do not be alone:Call your Safe Contact- someone whom you trust who will be there for you.  · Call your local CRISIS HOTLINE 373-7901 or 266-576-7316  · Call your local Children's Mobile Crisis Response Team [place] (685) 539-0538 or www.MedPro  · Call the toll free National Suicide Prevention Hotlines   · National Suicide Prevention Lifeline 541-612-IYAV (0795)  · National Hope Line Network 800-SUICIDE (244-3714)

## 2017-06-30 NOTE — PROGRESS NOTES
Patient arousable to name, lethargic this shift. Combative at times.   Discontinued LLE soft restraint. BUE soft wrist restraints continue.   Incontinent of bowel and bladder.   2 person assist to reposition.   Anticipate discharge back to [facility] tomorrow.

## 2017-06-30 NOTE — PROGRESS NOTES
24/hr caretaker at bedside per family.    Pt A&O x1, no apparent signs of pain. Pt resting comfortably, took medications crushed in chocolate pudding well. Still in 3 pt restraints, checked for CMS frequently no signs of skin breakdown.     1 BM thus far, turning Q 2hrs, waffle mattress overlay and mepilex to sacral region. Call light w/ caretaker for any concerns at pt is confused, treaded socks on, appropriate signs at doorway, hourly rounding continued.

## 2017-06-30 NOTE — DISCHARGE PLANNING
Received DME choice form from [facility](JAUN), however, patient needs hospital bed order. Carmen) notified via voicemail. DME order and home health order pending.

## 2017-06-30 NOTE — CARE PLAN
Problem: Pain Management  Goal: Pain level will decrease to patient’s comfort goal  Outcome: MET Date Met:  06/30/17  Pt appears to be in no pain. Flacc=3. Other care plans not met due to confusion/dementia

## 2017-06-30 NOTE — CARE PLAN
Problem: Safety  Goal: Will remain free from falls  Outcome: PROGRESSING AS EXPECTED  Pt will be free from falls w/ hourly rounding, appropriate signs placed and call lights remaining within reach.    Problem: Venous Thromboembolism (VTW)/Deep Vein Thrombosis (DVT) Prevention:  Goal: Patient will participate in Venous Thrombosis (VTE)/Deep Vein Thrombosis (DVT)Prevention Measures  Outcome: PROGRESSING AS EXPECTED  Pt will comply with anticoagulation medication or mechanical SCDs for prevention of DVTs

## 2017-06-30 NOTE — DISCHARGE SUMMARY
[facility] [place] [facility] [place]      Admit Date:  6/15/2017       Discharge Date:   6/30/2017    Service:   R Internal Medicine Gray Team  Attending Physician(s):   Dr. Chester  Senior Resident(s):   Dr. Chopra  Paulo Resident(s):   Dr. Newell      Primary Diagnosis:   C. difficile colitis    Secondary Diagnoses:                Late onset Alzheimer's disease with behavioral disturbance  Anemia  Dermatitis associated with incontinence  Hypokalemia  Hypomagnesemia  Leukocytosis    Hospital Summary (Brief Narrative):       71 year old male with a PMH of Alzheimer's dementia with behavioral disturbance presented to the ED on 6/15/2017 with complaints of fever and diarrhea for 4 days prior to admit. Mr. Correa had recently been admitted at [facility] and treated for aspiration pneumonia with IV antibiotics. Prior to admit the patient received a 10 day course of ciprofloxacin followed by 4 days of severe diarrhea and spiking fevers. On admit the patient's WBC count was 17.2, platelets were 115 and the patient was experiencing hypokalemia, hypophosphatemia and hypomagnesemia likely secondary to patient's sever diarrhea. Patient experienced spiking fevers until 4 days after admission and on hospital day 2 the patient was diagnosed with NAP positive C. Diff colitis, but was initiated on PO vanc since admission. A CT Abd on 6/16 showed changes compatible with severe colitis. On admit there was some suspicion for aspiration pneumonia as the patient did have hazy b/l lower lobe infiltrates, greater on right and was initially started on IV antibiotics for this, but these were discontinued as it was not thought that he had active pneumonia, but atelectasis and resolving infiltrates from his recent aspiration pneumonia. Infectious disease assessed the patient and recommended a 14 day course of PO vancomycin with discontinuation of IV metronidazole. IV metronidazole was eventually restarted for several days  as the patient had spiking fevers. Surgery was asked to follow the patient in the case that the patient experienced acute abdomen or necrotizing colitis, but his abdominal exam remained benign and the patient eventually improved. His stools became more formed and less frequent prior to discharge, despite refusing multiple doses of PO vanc due to delirium confusion on top of his existing dementia. Patient required soft restraints while inpatient as he pulled out multiple IV's and was a fall risk. Patient was discharged after finishing his course of 14 days of PO vanc back to the [facility] where he came from. Case was discussed almost daily with patient's outpatient care manager, Elida, who helped organize the patient's care.       Patient /Hospital Summary (Details -- Problem Oriented) :          C. difficile diarrhea  - Severe C.diff/NAP-1 strain positive  - Several day history of diarrhea with worsening mentation in setting of recent abx use  - Leukocytosis on admission, resolved  - Blood Cx: No growth at 5 days  - CT Abd 6/16: Small bilateral pleural effusions with atelectasis versus consolidation. Changes compatible with severe colitis.  - KUB 6/17 on admission: mildly dilated air-filled loops of small bowel, ileus vs. Intermittent or partial obstruction.    - KUB 6/19: Diffuse air-filled distention of small bowel, appearance suggests ileus or enteritis  - Surgery recs: patient stable, continue medical management, signed off  - ID recs: D/C IV metronidazole, continue PO vanco for total of 14 days, signed off    - Patient missed several doses of PO vanco due to agitation and refusal    - Restarted IV metronidazole after fever spike on 6/19. D/C'ed again on 6/25.              Plan:              -Finished 14 day course of PO vanc 6/28. ID stated ok to stop vanc and observe.              -D/C fluids              -Nystatin/triamcinolone for perineal irritation, zinc oxide added              -Frequent contact  with DPOA Jaquan and o/p care coordinatorElida                -Accepted back at [facility]. Discussed case with them. Likely D/C back there soon.    Late onset Alzheimer's disease with behavioral disturbance  -Home meds: seroquel 25mg bid, depakote 125mg qid  -Confused and altered on admission, was slowly improving but patient is has continued intermittent aggression, agitation, uncooperative. -Has required Haldol PRNs for acute agitation.              Plan:              -Continue depakote home dose, seroquel 25mg BID, will also add PRN seroquel, can try using this first before IV haldol              -DC ativan (made patient much worse), use Haldol PRN for agitation              -Discussed with DPOAs on importance of re-orientation of patient with familiar faces. Counseled caregiver at bedside to keep blinds          open for natural sunlight during daytime.    Incontinence dermatitis  -Patient with frequent urinary and fecal incontinence causing worsening incontinence dermatitis    -Nystatin/triamcinolone since admit  -Add zinc oxide for barrier protection  -Condom catheter as tolerated. Continues to pull it off.  -Rectal tube PRN    Consultants:     Infectious disease: Dr. Mejia  General Surgery: Dr. Ross    Procedures:        Rectal tube PRN  Holland catheter  Condom catheter    Imaging/ Testing:      Chest xray 6/15/2017 X 2, 6/19/2017: Hazy b/l lower lobe infiltrates, greater on right  Abdominal xray 6/15/2017 X 2, 6/17/2017: Mildly dilated air-filled loops of small bowel throughout upper abdomen  Ct abdomen 6/16/2017: Small b/l pleural effusion with atelectasis, changes compatible with severe colitis, atherosclerosis    Discharge Medications:         Medication Reconciliation: Completed     Medication List      CONTINUE taking these medications       Instructions    acetaminophen 325 MG Tabs   Last time this was given:  650 mg on 6/19/2017  7:49 PM   Commonly known as:  TYLENOL    Take 650 mg by  mouth every 6 hours as needed (pain).   Dose:  650 mg       Divalproex Sodium 125 MG Csdr   Last time this was given:  125 mg on 6/30/2017  2:23 PM   Commonly known as:  DEPAKOTE    Take 125 mg by mouth 4 times a day.   Dose:  125 mg       multivitamin Tabs   Last time this was given:  1 Tab on 6/30/2017  9:49 AM    Take 1 Tab by mouth every day.   Dose:  1 Tab       nystatin/triamcinolone 351834-4.1 UNIT/GM-% Crea   Last time this was given:  6/20/2017  8:47 PM   Commonly known as:  MYCOLOG    Apply  to affected area(s) 2 Times a Day. Apply to affected areas on groin and buttocks       * quetiapine 25 MG Tabs   Last time this was given:  25 mg on 6/30/2017  9:49 AM   Commonly known as:  SEROQUEL    Take 25 mg by mouth 2 times a day. Twice a day at noon and bedtime   Dose:  25 mg       * quetiapine 25 MG Tabs   Last time this was given:  25 mg on 6/30/2017  9:49 AM   Commonly known as:  SEROQUEL    Take 50 mg by mouth every 8 hours as needed (insomnia or confused agitation).   Dose:  50 mg       tamsulosin 0.4 MG capsule   Last time this was given:  0.8 mg on 6/29/2017 10:12 PM   Commonly known as:  FLOMAX    Take 0.8 mg by mouth every bedtime.   Dose:  0.8 mg       * Notice:  This list has 2 medication(s) that are the same as other medications prescribed for you. Read the directions carefully, and ask your doctor or other care provider to review them with you.      STOP taking these medications          ciprofloxacin 500 MG Tabs   Commonly known as:  CIPRO       lorazepam 1 MG Tabs   Commonly known as:  ATIVAN             Disposition:   Stable    Diet:   Regular    Activity:   As tolerated    Instructions:      The patient was instructed to return to the ER in the event of worsening symptoms. I have counseled the patient on the importance of compliance and the patient has agreed to proceed with all medical recommendations and follow up plan indicated above.   The patient understands that all medications come with  benefits and risks. Risks may include permanent injury or death and these risks can be minimized with close reassessment and monitoring.        Primary Care Provider:   Bong Monson M.D.  Discharge summary faxed to primary care provider:  Deferred  Copy of discharge summary given to the patient: Deferred    Follow up appointment details :      Follow up with primary care physician in 1 week after discharge    Pending Studies:        None    Time spent on discharge day patient visit, preparing discharge paperwork and arranging for patient follow up.    Summary of follow up issues:   Monitor resolution of diarrhea    Discharge Time (Minutes) :    30 minutes      Condition on Discharge    ______________________________________________________________________    Interval history/exam for day of discharge:    Patient doing well, no new issues, stable for discharge back to [facility] today, case discussed with case coordinator, Elida.    Filed Vitals:    06/29/17 1847 06/29/17 1900 06/30/17 0300 06/30/17 0745   BP: 94/53 101/60 101/60 113/51   Pulse: 89  91 86   Temp: 36.8 °C (98.2 °F)  36.2 °C (97.1 °F) 36.5 °C (97.7 °F)   Resp: 18  16 20   Height:       Weight:       SpO2: 96%  95% 96%     Weight/BMI: Body mass index is 19.31 kg/(m^2).  Pulse Oximetry: 96 %, O2 (LPM): 0, O2 Delivery: None (Room Air)    Constitutional:  Alert, disoriented, confused, resting in bed this AM, easily roused, in no acute distress  Head: Normocephalic and atraumatic.    Eyes: EOM are normal.      Cardiovascular: Normal rate, regular rhythm, normal heart sounds. No murmur heard.  Pulmonary/Chest: Effort normal and breath sounds normal. No respiratory distress. He has no wheezes. He has no rales.    Abdominal: Soft. Bowel sounds are normal. He exhibits no distension and no mass. There is no rebound and no guarding.   Musculoskeletal: Normal range of motion. He exhibits no edema.   Neurological: Not oriented    Skin: Skin is warm. No  erythema.    Most Recent Labs:    Lab Results   Component Value Date/Time    WBC 13.5* 06/30/2017 02:28 AM    RBC 4.49* 06/30/2017 02:28 AM    HEMOGLOBIN 13.1* 06/30/2017 02:28 AM    HEMATOCRIT 41.0* 06/30/2017 02:28 AM    MCV 91.3 06/30/2017 02:28 AM    MCH 29.2 06/30/2017 02:28 AM    MCHC 32.0* 06/30/2017 02:28 AM    MPV 10.3 06/30/2017 02:28 AM    NEUTROPHILS-POLYS 74.50* 06/30/2017 02:28 AM    LYMPHOCYTES 14.70* 06/30/2017 02:28 AM    MONOCYTES 7.80 06/30/2017 02:28 AM    EOSINOPHILS 1.90 06/30/2017 02:28 AM    BASOPHILS 0.70 06/30/2017 02:28 AM      Lab Results   Component Value Date/Time    SODIUM 138 06/30/2017 02:28 AM    POTASSIUM 4.3 06/30/2017 02:28 AM    CHLORIDE 105 06/30/2017 02:28 AM    CO2 29 06/30/2017 02:28 AM    GLUCOSE 97 06/30/2017 02:28 AM    BUN 15 06/30/2017 02:28 AM    CREATININE 0.78 06/30/2017 02:28 AM      Lab Results   Component Value Date/Time    ALT(SGPT) 31 06/21/2017 03:36 AM    AST(SGOT) 34 06/21/2017 03:36 AM    ALKALINE PHOSPHATASE 26* 06/21/2017 03:36 AM    TOTAL BILIRUBIN 0.3 06/21/2017 03:36 AM    ALBUMIN 2.3* 06/21/2017 03:36 AM    GLOBULIN 2.5 06/21/2017 03:36 AM     No results found for: PROTHROMBTM, INR

## 2017-06-30 NOTE — FACE TO FACE
Face to Face Note  -  Durable Medical Equipment    Justo Newell M.D. - NPI: [id number]  I certify that this patient is under my care and that they have had a durable medical equipment(DME)face to face encounter by myself that meets the physician DME face-to-face encounter requirements with this patient on:    Date of encounter:   Patient:                    MRN:                       YOB: 2019  Neymar Anderson  [id number]  1945     The encounter with the patient was in whole, or in part, for the following medical condition, which is the primary reason for durable medical equipment:  Other - C. diff colitis    I certify that, based on my findings, the following durable medical equipment is medically necessary:  Beds. Hospital Bed.         My Clinical findings support the need for the above equipment due to:  Bedbound/non-weight bearing    Supporting Symptoms: Weakness, bed bound, incontinence dermatitis     ------------------------------------------------------------------------------------------------------------------    Face to Face Supporting Documentation - Home Health    The encounter with this patient was in whole or in part the primary reason for home health admission.    Date of encounter:   Patient:                    MRN:                       YOB: 2019  Neymar Anderson  [id number]  1945     Home health to see patient for:  Home health aide    Skilled need for:  Recent Deterioration of Health Status C. diff colitis    Skilled nursing interventions to include:  Comment: Physical therapy    Homebound evidenced status by:  Needs the assistance of another person in order to leave the home. Leaving home must require a considerable and taxing effort. There must exist a normal inability to leave the home.    Community Physician to provide follow up care: Bong Monson M.D.     Optional Interventions    Wound information & treatment:    Home Infusion  Therapy orders:    Line/Drain/Airway:    I certify the face to face encounter for this home care referral meets the CMS requirements and the encounter/clinical assessment with the patient was, in whole, or in part, for the medical condition(s) listed above, which is the primary reason for home health care. Based on my clinical findings: the service(s) are medically necessary, support the need for home health care, and the homebound criteria are met.  I certify that this patient has had a face to face encounter by myself.  Justo Newell M.D. - NPI: [id number]    *Debility, frailty and advanced age in the absence of an acute deterioration or exacerbation of a condition do not qualify a patient for home health.

## 2017-07-06 NOTE — ADDENDUM NOTE
Encounter addended by: Jazmín Jiang on: 7/6/2017 11:31 AM<BR>     Documentation filed: Clinical Notes

## 2017-07-06 NOTE — DOCUMENTATION QUERY
"DOCUMENTATION QUERY    PROVIDERS: Please select “Cosign w/ note”to reply to query.    To better represent the severity of illness of your patient, please review the following information and exercise your independent professional judgment in responding to this query.     Patient presents with C difficile colitis. ED documents that the patient has apparent sepsis. History and Physical documents, \"He was found to be demented, tachycardic, tachypneic, hypotensive\". Infectious Disease Consult dated 06/16 documents sepsis secondary to C difficile colitis. There is no other mention of sepsis throughout the chart.    Patient's vitals on admission are as follows:    WBC 17.2  RR 18    Temp 37.4 C    Can sepsis be further clarified as    1. Ruled in  2. Ruled out  3. Other explanation of clinical presentation (please document)  4. Unable to determine      The medical record reflects the following:   Clinical Findings  C diff   sepsis   Treatment  IV Flagyl, IV fluids   Risk Factors     Location within medical record  History and Physical, Progress Notes, Discharge Summary, Infectious Disease Consult Report and ED Report     Thank you,   Jazmín Jiang          "

## 2017-07-07 NOTE — ADDENDUM NOTE
Encounter addended by: Jazmín Jiang on: 7/7/2017  7:55 AM<BR>     Documentation filed: Clinical Notes

## 2017-08-21 ENCOUNTER — HOSPITAL ENCOUNTER (EMERGENCY)
Dept: HOSPITAL 8 - ED | Age: 72
Discharge: HOME | End: 2017-08-21
Payer: MEDICARE

## 2017-08-21 VITALS — DIASTOLIC BLOOD PRESSURE: 63 MMHG | SYSTOLIC BLOOD PRESSURE: 109 MMHG

## 2017-08-21 VITALS — WEIGHT: 166.34 LBS | HEIGHT: 67 IN | BODY MASS INDEX: 26.11 KG/M2

## 2017-08-21 DIAGNOSIS — F02.81: ICD-10-CM
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
## 2017-08-21 DIAGNOSIS — S01.01XA: Primary | ICD-10-CM

## 2017-08-21 DIAGNOSIS — S90.31XA: ICD-10-CM

## 2017-08-21 DIAGNOSIS — G30.1: ICD-10-CM
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
## 2017-08-21 DIAGNOSIS — F17.200: ICD-10-CM

## 2017-08-21 PROCEDURE — 99284 EMERGENCY DEPT VISIT MOD MDM: CPT

## 2017-08-21 PROCEDURE — 12002 RPR S/N/AX/GEN/TRNK2.6-7.5CM: CPT

## 2017-08-21 PROCEDURE — 93005 ELECTROCARDIOGRAM TRACING: CPT

## 2017-08-21 PROCEDURE — 70450 CT HEAD/BRAIN W/O DYE: CPT

## 2017-08-26 ENCOUNTER — HOSPITAL ENCOUNTER (OUTPATIENT)
Dept: HOSPITAL 8 - ED | Age: 72
Setting detail: OBSERVATION
LOS: 1 days | Discharge: HOME | End: 2017-08-27
Attending: HOSPITALIST
Payer: MEDICARE

## 2017-08-26 VITALS — SYSTOLIC BLOOD PRESSURE: 113 MMHG | DIASTOLIC BLOOD PRESSURE: 63 MMHG

## 2017-08-26 VITALS — WEIGHT: 165.79 LBS | HEIGHT: 72 IN | BODY MASS INDEX: 22.46 KG/M2

## 2017-08-26 VITALS — DIASTOLIC BLOOD PRESSURE: 69 MMHG | SYSTOLIC BLOOD PRESSURE: 114 MMHG

## 2017-08-26 DIAGNOSIS — G30.9: ICD-10-CM

## 2017-08-26 DIAGNOSIS — Z87.01: ICD-10-CM

## 2017-08-26 DIAGNOSIS — S01.01XA: Primary | ICD-10-CM

## 2017-08-26 DIAGNOSIS — D64.9: ICD-10-CM

## 2017-08-26 DIAGNOSIS — Z82.5: ICD-10-CM

## 2017-08-26 DIAGNOSIS — E44.0: ICD-10-CM

## 2017-08-26 DIAGNOSIS — G93.40: ICD-10-CM

## 2017-08-26 DIAGNOSIS — F02.81: ICD-10-CM

## 2017-08-26 LAB
BUN SERPL-MCNC: 16 MG/DL (ref 7–18)
HCT VFR BLD CALC: 39.4 % (ref 39.2–51.8)
HGB BLD-MCNC: 12.9 G/DL (ref 13.7–18)
WBC # BLD AUTO: 6 X10^3/UL (ref 3.4–10)

## 2017-08-26 PROCEDURE — 93308 TTE F-UP OR LMTD: CPT

## 2017-08-26 PROCEDURE — 36415 COLL VENOUS BLD VENIPUNCTURE: CPT

## 2017-08-26 PROCEDURE — 93325 DOPPLER ECHO COLOR FLOW MAPG: CPT

## 2017-08-26 PROCEDURE — 82040 ASSAY OF SERUM ALBUMIN: CPT

## 2017-08-26 PROCEDURE — 87040 BLOOD CULTURE FOR BACTERIA: CPT

## 2017-08-26 PROCEDURE — 84443 ASSAY THYROID STIM HORMONE: CPT

## 2017-08-26 PROCEDURE — 96361 HYDRATE IV INFUSION ADD-ON: CPT

## 2017-08-26 PROCEDURE — 80048 BASIC METABOLIC PNL TOTAL CA: CPT

## 2017-08-26 PROCEDURE — 83036 HEMOGLOBIN GLYCOSYLATED A1C: CPT

## 2017-08-26 PROCEDURE — 93880 EXTRACRANIAL BILAT STUDY: CPT

## 2017-08-26 PROCEDURE — G0378 HOSPITAL OBSERVATION PER HR: HCPCS

## 2017-08-26 PROCEDURE — 85025 COMPLETE CBC W/AUTO DIFF WBC: CPT

## 2017-08-26 PROCEDURE — 83735 ASSAY OF MAGNESIUM: CPT

## 2017-08-26 PROCEDURE — 99285 EMERGENCY DEPT VISIT HI MDM: CPT

## 2017-08-26 PROCEDURE — 12001 RPR S/N/AX/GEN/TRNK 2.5CM/<: CPT

## 2017-08-26 PROCEDURE — 71010: CPT

## 2017-08-26 PROCEDURE — 80164 ASSAY DIPROPYLACETIC ACD TOT: CPT

## 2017-08-26 PROCEDURE — 96372 THER/PROPH/DIAG INJ SC/IM: CPT

## 2017-08-26 PROCEDURE — 84100 ASSAY OF PHOSPHORUS: CPT

## 2017-08-26 PROCEDURE — 81003 URINALYSIS AUTO W/O SCOPE: CPT

## 2017-08-26 PROCEDURE — 70450 CT HEAD/BRAIN W/O DYE: CPT

## 2017-08-26 PROCEDURE — 93321 DOPPLER ECHO F-UP/LMTD STD: CPT

## 2017-08-26 PROCEDURE — 96360 HYDRATION IV INFUSION INIT: CPT

## 2017-08-26 PROCEDURE — 93005 ELECTROCARDIOGRAM TRACING: CPT

## 2017-08-26 PROCEDURE — 84439 ASSAY OF FREE THYROXINE: CPT

## 2017-08-26 PROCEDURE — 83605 ASSAY OF LACTIC ACID: CPT

## 2017-08-26 RX ADMIN — DIVALPROEX SODIUM SCH MG: 125 TABLET, DELAYED RELEASE ORAL at 17:29

## 2017-08-26 RX ADMIN — QUETIAPINE FUMARATE SCH MG: 25 TABLET ORAL at 13:39

## 2017-08-26 RX ADMIN — VITAMIN D, TAB 1000IU (100/BT) SCH UNITS: 25 TAB at 13:00

## 2017-08-26 RX ADMIN — Medication SCH TAB: at 13:00

## 2017-08-26 RX ADMIN — SODIUM CHLORIDE SCH MLS/HR: 0.9 INJECTION, SOLUTION INTRAVENOUS at 11:00

## 2017-08-26 RX ADMIN — DIVALPROEX SODIUM SCH MG: 125 TABLET, DELAYED RELEASE ORAL at 13:39

## 2017-08-26 RX ADMIN — Medication SCH MG: at 13:00

## 2017-08-26 RX ADMIN — POTASSIUM CHLORIDE SCH MEQ: 750 TABLET, FILM COATED, EXTENDED RELEASE ORAL at 13:00

## 2017-08-26 RX ADMIN — QUETIAPINE FUMARATE SCH MG: 25 TABLET ORAL at 21:00

## 2017-08-26 RX ADMIN — Medication SCH MCG: at 13:00

## 2017-08-26 RX ADMIN — ZIPRASIDONE MESYLATE PRN MG: 20 INJECTION, POWDER, LYOPHILIZED, FOR SOLUTION INTRAMUSCULAR at 19:33

## 2017-08-26 RX ADMIN — FUROSEMIDE SCH MG: 20 TABLET ORAL at 13:00

## 2017-08-26 RX ADMIN — DIVALPROEX SODIUM SCH MG: 125 TABLET, DELAYED RELEASE ORAL at 21:00

## 2017-08-26 RX ADMIN — NYSTATIN SCH APPLIC: 100000 OINTMENT TOPICAL at 21:00

## 2017-08-27 VITALS — DIASTOLIC BLOOD PRESSURE: 76 MMHG | SYSTOLIC BLOOD PRESSURE: 123 MMHG

## 2017-08-27 VITALS — SYSTOLIC BLOOD PRESSURE: 112 MMHG | DIASTOLIC BLOOD PRESSURE: 64 MMHG

## 2017-08-27 LAB
BUN SERPL-MCNC: 16 MG/DL (ref 7–18)
HCT VFR BLD CALC: 41.2 % (ref 39.2–51.8)
HGB BLD-MCNC: 13.6 G/DL (ref 13.7–18)
WBC # BLD AUTO: 7 X10^3/UL (ref 3.4–10)

## 2017-08-27 RX ADMIN — SODIUM CHLORIDE SCH MLS/HR: 0.9 INJECTION, SOLUTION INTRAVENOUS at 00:20

## 2017-08-27 RX ADMIN — ZIPRASIDONE MESYLATE PRN MG: 20 INJECTION, POWDER, LYOPHILIZED, FOR SOLUTION INTRAMUSCULAR at 00:30

## 2017-08-27 RX ADMIN — NYSTATIN SCH APPLIC: 100000 OINTMENT TOPICAL at 09:00

## 2017-08-27 RX ADMIN — ZIPRASIDONE MESYLATE PRN MG: 20 INJECTION, POWDER, LYOPHILIZED, FOR SOLUTION INTRAMUSCULAR at 05:31

## 2017-08-27 RX ADMIN — Medication SCH MCG: at 09:00

## 2017-08-27 RX ADMIN — Medication SCH MG: at 09:00

## 2017-08-27 RX ADMIN — FUROSEMIDE SCH MG: 20 TABLET ORAL at 09:00

## 2017-08-27 RX ADMIN — VITAMIN D, TAB 1000IU (100/BT) SCH UNITS: 25 TAB at 09:00

## 2017-08-27 RX ADMIN — DIVALPROEX SODIUM SCH MG: 125 TABLET, DELAYED RELEASE ORAL at 05:31

## 2017-08-27 RX ADMIN — POTASSIUM CHLORIDE SCH MEQ: 750 TABLET, FILM COATED, EXTENDED RELEASE ORAL at 09:00

## 2017-08-27 RX ADMIN — Medication SCH TAB: at 09:00

## 2017-09-07 ENCOUNTER — HOSPITAL ENCOUNTER (INPATIENT)
Dept: HOSPITAL 8 - ED | Age: 72
LOS: 11 days | Discharge: NURSING, REHAB OR LONG-TERM CARE | DRG: 872 | End: 2017-09-18
Attending: FAMILY MEDICINE | Admitting: FAMILY MEDICINE
Payer: MEDICARE

## 2017-09-07 VITALS — HEIGHT: 73 IN | BODY MASS INDEX: 19.17 KG/M2 | WEIGHT: 144.62 LBS

## 2017-09-07 VITALS — SYSTOLIC BLOOD PRESSURE: 112 MMHG | DIASTOLIC BLOOD PRESSURE: 50 MMHG

## 2017-09-07 DIAGNOSIS — E44.0: ICD-10-CM

## 2017-09-07 DIAGNOSIS — A04.7: ICD-10-CM

## 2017-09-07 DIAGNOSIS — F02.80: ICD-10-CM

## 2017-09-07 DIAGNOSIS — Z16.19: ICD-10-CM

## 2017-09-07 DIAGNOSIS — N40.0: ICD-10-CM

## 2017-09-07 DIAGNOSIS — G30.9: ICD-10-CM

## 2017-09-07 DIAGNOSIS — E87.6: ICD-10-CM

## 2017-09-07 DIAGNOSIS — A41.9: Primary | ICD-10-CM

## 2017-09-07 LAB
AST SERPL-CCNC: 17 U/L (ref 15–37)
BUN SERPL-MCNC: 16 MG/DL (ref 7–18)
HCT VFR BLD CALC: 40.1 % (ref 39.2–51.8)
HGB BLD-MCNC: 13.4 G/DL (ref 13.7–18)
IS PT STATUS REG ER OR PRE ER?: YES
WBC # BLD AUTO: 16 X10^3/UL (ref 3.4–10)

## 2017-09-07 PROCEDURE — 74177 CT ABD & PELVIS W/CONTRAST: CPT

## 2017-09-07 PROCEDURE — 81003 URINALYSIS AUTO W/O SCOPE: CPT

## 2017-09-07 PROCEDURE — 80048 BASIC METABOLIC PNL TOTAL CA: CPT

## 2017-09-07 PROCEDURE — 83735 ASSAY OF MAGNESIUM: CPT

## 2017-09-07 PROCEDURE — 85730 THROMBOPLASTIN TIME PARTIAL: CPT

## 2017-09-07 PROCEDURE — 83690 ASSAY OF LIPASE: CPT

## 2017-09-07 PROCEDURE — 96372 THER/PROPH/DIAG INJ SC/IM: CPT

## 2017-09-07 PROCEDURE — 84145 PROCALCITONIN (PCT): CPT

## 2017-09-07 PROCEDURE — 85610 PROTHROMBIN TIME: CPT

## 2017-09-07 PROCEDURE — 87324 CLOSTRIDIUM AG IA: CPT

## 2017-09-07 PROCEDURE — 87186 SC STD MICRODIL/AGAR DIL: CPT

## 2017-09-07 PROCEDURE — 87040 BLOOD CULTURE FOR BACTERIA: CPT

## 2017-09-07 PROCEDURE — 96374 THER/PROPH/DIAG INJ IV PUSH: CPT

## 2017-09-07 PROCEDURE — 36415 COLL VENOUS BLD VENIPUNCTURE: CPT

## 2017-09-07 PROCEDURE — 83605 ASSAY OF LACTIC ACID: CPT

## 2017-09-07 PROCEDURE — 87077 CULTURE AEROBIC IDENTIFY: CPT

## 2017-09-07 PROCEDURE — 80053 COMPREHEN METABOLIC PANEL: CPT

## 2017-09-07 PROCEDURE — 84484 ASSAY OF TROPONIN QUANT: CPT

## 2017-09-07 PROCEDURE — 71010: CPT

## 2017-09-07 PROCEDURE — 85025 COMPLETE CBC W/AUTO DIFF WBC: CPT

## 2017-09-07 RX ADMIN — NYSTATIN SCH APPLIC: 100000 OINTMENT TOPICAL at 21:00

## 2017-09-08 VITALS — DIASTOLIC BLOOD PRESSURE: 65 MMHG | SYSTOLIC BLOOD PRESSURE: 98 MMHG

## 2017-09-08 VITALS — SYSTOLIC BLOOD PRESSURE: 101 MMHG | DIASTOLIC BLOOD PRESSURE: 61 MMHG

## 2017-09-08 VITALS — DIASTOLIC BLOOD PRESSURE: 69 MMHG | SYSTOLIC BLOOD PRESSURE: 159 MMHG

## 2017-09-08 LAB
BUN SERPL-MCNC: 13 MG/DL (ref 7–18)
DIFF TOTAL CELLS COUNTED: (no result)
HCT VFR BLD CALC: 38.2 % (ref 39.2–51.8)
HGB BLD-MCNC: 13 G/DL (ref 13.7–18)
LG PLATELETS BLD QL SMEAR: (no result)
VERIFY COUNTS?: YES
WBC # BLD AUTO: 21.3 X10^3/UL (ref 3.4–10)

## 2017-09-08 RX ADMIN — TAMSULOSIN HYDROCHLORIDE SCH MG: 0.4 CAPSULE ORAL at 20:15

## 2017-09-08 RX ADMIN — NYSTATIN SCH APPLIC: 100000 OINTMENT TOPICAL at 09:00

## 2017-09-08 RX ADMIN — QUETIAPINE FUMARATE SCH MG: 25 TABLET ORAL at 20:15

## 2017-09-08 RX ADMIN — VANCOMYCIN HYDROCHLORIDE SCH MG: 1 INJECTION, POWDER, LYOPHILIZED, FOR SOLUTION INTRAVENOUS at 06:34

## 2017-09-08 RX ADMIN — DIVALPROEX SODIUM SCH MG: 125 TABLET, DELAYED RELEASE ORAL at 17:07

## 2017-09-08 RX ADMIN — VANCOMYCIN HYDROCHLORIDE SCH MG: 1 INJECTION, POWDER, LYOPHILIZED, FOR SOLUTION INTRAVENOUS at 00:38

## 2017-09-08 RX ADMIN — Medication SCH TAB: at 09:50

## 2017-09-08 RX ADMIN — ENOXAPARIN SODIUM SCH MG: 40 INJECTION SUBCUTANEOUS at 20:16

## 2017-09-08 RX ADMIN — DIVALPROEX SODIUM SCH MG: 125 TABLET, DELAYED RELEASE ORAL at 12:02

## 2017-09-08 RX ADMIN — FUROSEMIDE SCH MG: 20 TABLET ORAL at 09:50

## 2017-09-08 RX ADMIN — ENOXAPARIN SODIUM SCH MG: 40 INJECTION SUBCUTANEOUS at 00:39

## 2017-09-08 RX ADMIN — QUETIAPINE FUMARATE SCH MG: 25 TABLET ORAL at 12:26

## 2017-09-08 RX ADMIN — DIVALPROEX SODIUM SCH MG: 125 TABLET, DELAYED RELEASE ORAL at 06:34

## 2017-09-08 RX ADMIN — Medication SCH MCG: at 09:50

## 2017-09-08 RX ADMIN — NYSTATIN SCH APPLIC: 100000 OINTMENT TOPICAL at 21:00

## 2017-09-08 RX ADMIN — DIVALPROEX SODIUM SCH MG: 125 TABLET, DELAYED RELEASE ORAL at 20:15

## 2017-09-08 RX ADMIN — POTASSIUM CHLORIDE SCH MEQ: 750 TABLET, FILM COATED, EXTENDED RELEASE ORAL at 09:50

## 2017-09-08 RX ADMIN — VANCOMYCIN HYDROCHLORIDE SCH MG: 1 INJECTION, POWDER, LYOPHILIZED, FOR SOLUTION INTRAVENOUS at 17:24

## 2017-09-08 RX ADMIN — TAMSULOSIN HYDROCHLORIDE SCH MG: 0.4 CAPSULE ORAL at 00:38

## 2017-09-08 RX ADMIN — DIVALPROEX SODIUM SCH MG: 125 TABLET, DELAYED RELEASE ORAL at 00:38

## 2017-09-08 RX ADMIN — VANCOMYCIN HYDROCHLORIDE SCH MG: 1 INJECTION, POWDER, LYOPHILIZED, FOR SOLUTION INTRAVENOUS at 12:26

## 2017-09-08 RX ADMIN — ENOXAPARIN SODIUM SCH MG: 40 INJECTION SUBCUTANEOUS at 20:20

## 2017-09-08 RX ADMIN — Medication SCH MG: at 09:50

## 2017-09-09 VITALS — SYSTOLIC BLOOD PRESSURE: 114 MMHG | DIASTOLIC BLOOD PRESSURE: 81 MMHG

## 2017-09-09 VITALS — SYSTOLIC BLOOD PRESSURE: 92 MMHG | DIASTOLIC BLOOD PRESSURE: 53 MMHG

## 2017-09-09 VITALS — SYSTOLIC BLOOD PRESSURE: 101 MMHG | DIASTOLIC BLOOD PRESSURE: 64 MMHG

## 2017-09-09 LAB
BUN SERPL-MCNC: 17 MG/DL (ref 7–18)
HCT VFR BLD CALC: 36.1 % (ref 39.2–51.8)
HGB BLD-MCNC: 12.2 G/DL (ref 13.7–18)
WBC # BLD AUTO: 13.5 X10^3/UL (ref 3.4–10)

## 2017-09-09 RX ADMIN — DIVALPROEX SODIUM SCH MG: 125 TABLET, DELAYED RELEASE ORAL at 06:10

## 2017-09-09 RX ADMIN — HALOPERIDOL PRN MG: 2 SOLUTION ORAL at 04:10

## 2017-09-09 RX ADMIN — NYSTATIN SCH APPLIC: 100000 OINTMENT TOPICAL at 21:54

## 2017-09-09 RX ADMIN — DIVALPROEX SODIUM SCH MG: 125 TABLET, DELAYED RELEASE ORAL at 09:03

## 2017-09-09 RX ADMIN — VANCOMYCIN HYDROCHLORIDE SCH MG: 1 INJECTION, POWDER, LYOPHILIZED, FOR SOLUTION INTRAVENOUS at 04:10

## 2017-09-09 RX ADMIN — VANCOMYCIN HYDROCHLORIDE SCH MG: 1 INJECTION, POWDER, LYOPHILIZED, FOR SOLUTION INTRAVENOUS at 12:10

## 2017-09-09 RX ADMIN — Medication SCH TAB: at 08:01

## 2017-09-09 RX ADMIN — HALOPERIDOL LACTATE PRN MG: 5 INJECTION, SOLUTION INTRAMUSCULAR at 15:47

## 2017-09-09 RX ADMIN — QUETIAPINE FUMARATE SCH MG: 25 TABLET ORAL at 12:12

## 2017-09-09 RX ADMIN — QUETIAPINE FUMARATE SCH MG: 25 TABLET ORAL at 12:49

## 2017-09-09 RX ADMIN — HALOPERIDOL PRN MG: 2 SOLUTION ORAL at 21:51

## 2017-09-09 RX ADMIN — HALOPERIDOL PRN MG: 2 SOLUTION ORAL at 12:49

## 2017-09-09 RX ADMIN — ENOXAPARIN SODIUM SCH MG: 40 INJECTION SUBCUTANEOUS at 21:54

## 2017-09-09 RX ADMIN — POTASSIUM CHLORIDE SCH MEQ: 750 TABLET, FILM COATED, EXTENDED RELEASE ORAL at 08:00

## 2017-09-09 RX ADMIN — Medication SCH MG: at 08:01

## 2017-09-09 RX ADMIN — NYSTATIN SCH APPLIC: 100000 OINTMENT TOPICAL at 08:01

## 2017-09-09 RX ADMIN — DIVALPROEX SODIUM SCH MG: 125 TABLET, DELAYED RELEASE ORAL at 08:00

## 2017-09-09 RX ADMIN — DIVALPROEX SODIUM SCH MG: 125 TABLET, DELAYED RELEASE ORAL at 15:46

## 2017-09-09 RX ADMIN — QUETIAPINE FUMARATE SCH MG: 25 TABLET ORAL at 21:50

## 2017-09-09 RX ADMIN — VANCOMYCIN HYDROCHLORIDE SCH MG: 1 INJECTION, POWDER, LYOPHILIZED, FOR SOLUTION INTRAVENOUS at 01:20

## 2017-09-09 RX ADMIN — DIVALPROEX SODIUM SCH MG: 125 TABLET, DELAYED RELEASE ORAL at 21:50

## 2017-09-09 RX ADMIN — TAMSULOSIN HYDROCHLORIDE SCH MG: 0.4 CAPSULE ORAL at 21:50

## 2017-09-09 RX ADMIN — VANCOMYCIN HYDROCHLORIDE SCH MG: 1 INJECTION, POWDER, LYOPHILIZED, FOR SOLUTION INTRAVENOUS at 17:51

## 2017-09-09 RX ADMIN — POTASSIUM CHLORIDE ONE MEQ: 20 TABLET, EXTENDED RELEASE ORAL at 08:18

## 2017-09-09 RX ADMIN — POTASSIUM CHLORIDE ONE MEQ: 20 TABLET, EXTENDED RELEASE ORAL at 09:06

## 2017-09-09 RX ADMIN — FUROSEMIDE SCH MG: 20 TABLET ORAL at 08:01

## 2017-09-09 RX ADMIN — Medication SCH MCG: at 08:01

## 2017-09-10 VITALS — SYSTOLIC BLOOD PRESSURE: 113 MMHG | DIASTOLIC BLOOD PRESSURE: 63 MMHG

## 2017-09-10 VITALS — SYSTOLIC BLOOD PRESSURE: 108 MMHG | DIASTOLIC BLOOD PRESSURE: 61 MMHG

## 2017-09-10 VITALS — SYSTOLIC BLOOD PRESSURE: 101 MMHG | DIASTOLIC BLOOD PRESSURE: 56 MMHG

## 2017-09-10 VITALS — DIASTOLIC BLOOD PRESSURE: 59 MMHG | SYSTOLIC BLOOD PRESSURE: 97 MMHG

## 2017-09-10 LAB
BUN SERPL-MCNC: 14 MG/DL (ref 7–18)
HCT VFR BLD CALC: 36.2 % (ref 39.2–51.8)
HGB BLD-MCNC: 12.1 G/DL (ref 13.7–18)
WBC # BLD AUTO: 8.4 X10^3/UL (ref 3.4–10)

## 2017-09-10 RX ADMIN — NYSTATIN SCH APPLIC: 100000 OINTMENT TOPICAL at 09:42

## 2017-09-10 RX ADMIN — VANCOMYCIN HYDROCHLORIDE SCH MG: 1 INJECTION, POWDER, LYOPHILIZED, FOR SOLUTION INTRAVENOUS at 11:46

## 2017-09-10 RX ADMIN — VANCOMYCIN HYDROCHLORIDE SCH MG: 1 INJECTION, POWDER, LYOPHILIZED, FOR SOLUTION INTRAVENOUS at 17:33

## 2017-09-10 RX ADMIN — FUROSEMIDE SCH MG: 20 TABLET ORAL at 09:41

## 2017-09-10 RX ADMIN — ACETAMINOPHEN PRN MG: 325 TABLET, FILM COATED ORAL at 17:33

## 2017-09-10 RX ADMIN — DIVALPROEX SODIUM SCH MG: 125 TABLET, DELAYED RELEASE ORAL at 06:09

## 2017-09-10 RX ADMIN — VANCOMYCIN HYDROCHLORIDE SCH MG: 1 INJECTION, POWDER, LYOPHILIZED, FOR SOLUTION INTRAVENOUS at 06:09

## 2017-09-10 RX ADMIN — Medication SCH TAB: at 09:41

## 2017-09-10 RX ADMIN — Medication SCH MCG: at 09:41

## 2017-09-10 RX ADMIN — DIVALPROEX SODIUM SCH MG: 125 TABLET, DELAYED RELEASE ORAL at 22:01

## 2017-09-10 RX ADMIN — Medication SCH MG: at 09:41

## 2017-09-10 RX ADMIN — TAMSULOSIN HYDROCHLORIDE SCH MG: 0.4 CAPSULE ORAL at 22:01

## 2017-09-10 RX ADMIN — NYSTATIN SCH APPLIC: 100000 OINTMENT TOPICAL at 22:01

## 2017-09-10 RX ADMIN — DIVALPROEX SODIUM SCH MG: 125 TABLET, DELAYED RELEASE ORAL at 11:46

## 2017-09-10 RX ADMIN — QUETIAPINE FUMARATE SCH MG: 25 TABLET ORAL at 11:46

## 2017-09-10 RX ADMIN — POTASSIUM CHLORIDE SCH MEQ: 750 TABLET, FILM COATED, EXTENDED RELEASE ORAL at 09:41

## 2017-09-10 RX ADMIN — ENOXAPARIN SODIUM SCH MG: 40 INJECTION SUBCUTANEOUS at 22:01

## 2017-09-10 RX ADMIN — QUETIAPINE FUMARATE SCH MG: 25 TABLET ORAL at 22:00

## 2017-09-10 RX ADMIN — POTASSIUM CHLORIDE SCH MEQ: 20 TABLET, EXTENDED RELEASE ORAL at 22:00

## 2017-09-10 RX ADMIN — VANCOMYCIN HYDROCHLORIDE SCH MG: 1 INJECTION, POWDER, LYOPHILIZED, FOR SOLUTION INTRAVENOUS at 00:26

## 2017-09-10 RX ADMIN — DIVALPROEX SODIUM SCH MG: 125 TABLET, DELAYED RELEASE ORAL at 17:33

## 2017-09-11 VITALS — DIASTOLIC BLOOD PRESSURE: 52 MMHG | SYSTOLIC BLOOD PRESSURE: 107 MMHG

## 2017-09-11 VITALS — DIASTOLIC BLOOD PRESSURE: 69 MMHG | SYSTOLIC BLOOD PRESSURE: 122 MMHG

## 2017-09-11 VITALS — DIASTOLIC BLOOD PRESSURE: 66 MMHG | SYSTOLIC BLOOD PRESSURE: 119 MMHG

## 2017-09-11 VITALS — DIASTOLIC BLOOD PRESSURE: 58 MMHG | SYSTOLIC BLOOD PRESSURE: 112 MMHG

## 2017-09-11 LAB
BUN SERPL-MCNC: 12 MG/DL (ref 7–18)
HCT VFR BLD CALC: 38.2 % (ref 39.2–51.8)
HGB BLD-MCNC: 13.1 G/DL (ref 13.7–18)
WBC # BLD AUTO: 17.9 X10^3/UL (ref 3.4–10)

## 2017-09-11 RX ADMIN — TAMSULOSIN HYDROCHLORIDE SCH MG: 0.4 CAPSULE ORAL at 21:11

## 2017-09-11 RX ADMIN — VANCOMYCIN HYDROCHLORIDE SCH MG: 1 INJECTION, POWDER, LYOPHILIZED, FOR SOLUTION INTRAVENOUS at 05:58

## 2017-09-11 RX ADMIN — FUROSEMIDE SCH MG: 20 TABLET ORAL at 09:12

## 2017-09-11 RX ADMIN — NYSTATIN SCH APPLIC: 100000 OINTMENT TOPICAL at 21:11

## 2017-09-11 RX ADMIN — NYSTATIN SCH APPLIC: 100000 OINTMENT TOPICAL at 09:16

## 2017-09-11 RX ADMIN — VANCOMYCIN HYDROCHLORIDE SCH MG: 1 INJECTION, POWDER, LYOPHILIZED, FOR SOLUTION INTRAVENOUS at 11:16

## 2017-09-11 RX ADMIN — POTASSIUM CHLORIDE SCH MEQ: 20 TABLET, EXTENDED RELEASE ORAL at 21:10

## 2017-09-11 RX ADMIN — QUETIAPINE FUMARATE SCH MG: 25 TABLET ORAL at 11:16

## 2017-09-11 RX ADMIN — Medication SCH TAB: at 09:12

## 2017-09-11 RX ADMIN — DIVALPROEX SODIUM SCH MG: 125 TABLET, DELAYED RELEASE ORAL at 11:16

## 2017-09-11 RX ADMIN — Medication SCH MG: at 09:12

## 2017-09-11 RX ADMIN — VANCOMYCIN HYDROCHLORIDE SCH MG: 1 INJECTION, POWDER, LYOPHILIZED, FOR SOLUTION INTRAVENOUS at 00:58

## 2017-09-11 RX ADMIN — VANCOMYCIN HYDROCHLORIDE SCH MG: 1 INJECTION, POWDER, LYOPHILIZED, FOR SOLUTION INTRAVENOUS at 17:17

## 2017-09-11 RX ADMIN — DIVALPROEX SODIUM SCH MG: 125 TABLET, DELAYED RELEASE ORAL at 05:58

## 2017-09-11 RX ADMIN — QUETIAPINE FUMARATE SCH MG: 25 TABLET ORAL at 21:10

## 2017-09-11 RX ADMIN — ENOXAPARIN SODIUM SCH MG: 40 INJECTION SUBCUTANEOUS at 21:10

## 2017-09-11 RX ADMIN — POTASSIUM CHLORIDE SCH MEQ: 20 TABLET, EXTENDED RELEASE ORAL at 09:12

## 2017-09-11 RX ADMIN — METRONIDAZOLE SCH MLS/HR: 500 INJECTION, SOLUTION INTRAVENOUS at 22:11

## 2017-09-11 RX ADMIN — DIVALPROEX SODIUM SCH MG: 125 TABLET, DELAYED RELEASE ORAL at 21:10

## 2017-09-11 RX ADMIN — ACETAMINOPHEN PRN MG: 325 TABLET, FILM COATED ORAL at 14:54

## 2017-09-11 RX ADMIN — DIVALPROEX SODIUM SCH MG: 125 TABLET, DELAYED RELEASE ORAL at 17:16

## 2017-09-11 RX ADMIN — Medication SCH MCG: at 09:12

## 2017-09-12 VITALS — SYSTOLIC BLOOD PRESSURE: 91 MMHG | DIASTOLIC BLOOD PRESSURE: 59 MMHG

## 2017-09-12 VITALS — SYSTOLIC BLOOD PRESSURE: 101 MMHG | DIASTOLIC BLOOD PRESSURE: 61 MMHG

## 2017-09-12 VITALS — DIASTOLIC BLOOD PRESSURE: 60 MMHG | SYSTOLIC BLOOD PRESSURE: 116 MMHG

## 2017-09-12 VITALS — SYSTOLIC BLOOD PRESSURE: 127 MMHG | DIASTOLIC BLOOD PRESSURE: 72 MMHG

## 2017-09-12 LAB
BUN SERPL-MCNC: 14 MG/DL (ref 7–18)
HCT VFR BLD CALC: 37.4 % (ref 39.2–51.8)
HGB BLD-MCNC: 12.5 G/DL (ref 13.7–18)
WBC # BLD AUTO: 17.6 X10^3/UL (ref 3.4–10)

## 2017-09-12 RX ADMIN — DIVALPROEX SODIUM SCH MG: 125 TABLET, DELAYED RELEASE ORAL at 20:13

## 2017-09-12 RX ADMIN — DIVALPROEX SODIUM SCH MG: 125 TABLET, DELAYED RELEASE ORAL at 05:49

## 2017-09-12 RX ADMIN — POTASSIUM CHLORIDE SCH MEQ: 20 TABLET, EXTENDED RELEASE ORAL at 20:14

## 2017-09-12 RX ADMIN — VANCOMYCIN HYDROCHLORIDE SCH MG: 1 INJECTION, POWDER, LYOPHILIZED, FOR SOLUTION INTRAVENOUS at 17:28

## 2017-09-12 RX ADMIN — ENOXAPARIN SODIUM SCH MG: 40 INJECTION SUBCUTANEOUS at 20:14

## 2017-09-12 RX ADMIN — DIVALPROEX SODIUM SCH MG: 125 TABLET, DELAYED RELEASE ORAL at 11:56

## 2017-09-12 RX ADMIN — DIVALPROEX SODIUM SCH MG: 125 TABLET, DELAYED RELEASE ORAL at 17:28

## 2017-09-12 RX ADMIN — TAMSULOSIN HYDROCHLORIDE SCH MG: 0.4 CAPSULE ORAL at 20:13

## 2017-09-12 RX ADMIN — VANCOMYCIN HYDROCHLORIDE SCH MG: 1 INJECTION, POWDER, LYOPHILIZED, FOR SOLUTION INTRAVENOUS at 05:49

## 2017-09-12 RX ADMIN — QUETIAPINE FUMARATE SCH MG: 25 TABLET ORAL at 11:57

## 2017-09-12 RX ADMIN — VANCOMYCIN HYDROCHLORIDE SCH MG: 1 INJECTION, POWDER, LYOPHILIZED, FOR SOLUTION INTRAVENOUS at 00:00

## 2017-09-12 RX ADMIN — METRONIDAZOLE SCH MLS/HR: 500 INJECTION, SOLUTION INTRAVENOUS at 14:23

## 2017-09-12 RX ADMIN — Medication SCH MCG: at 10:03

## 2017-09-12 RX ADMIN — Medication SCH MG: at 10:03

## 2017-09-12 RX ADMIN — Medication SCH TAB: at 10:03

## 2017-09-12 RX ADMIN — POTASSIUM CHLORIDE SCH MEQ: 20 TABLET, EXTENDED RELEASE ORAL at 10:03

## 2017-09-12 RX ADMIN — METRONIDAZOLE SCH MLS/HR: 500 INJECTION, SOLUTION INTRAVENOUS at 21:49

## 2017-09-12 RX ADMIN — VANCOMYCIN HYDROCHLORIDE SCH MG: 1 INJECTION, POWDER, LYOPHILIZED, FOR SOLUTION INTRAVENOUS at 23:47

## 2017-09-12 RX ADMIN — METRONIDAZOLE SCH MLS/HR: 500 INJECTION, SOLUTION INTRAVENOUS at 05:49

## 2017-09-12 RX ADMIN — CEFTRIAXONE SCH MLS/HR: 1 INJECTION, SOLUTION INTRAVENOUS at 17:28

## 2017-09-12 RX ADMIN — VANCOMYCIN HYDROCHLORIDE SCH MG: 1 INJECTION, POWDER, LYOPHILIZED, FOR SOLUTION INTRAVENOUS at 11:59

## 2017-09-12 RX ADMIN — QUETIAPINE FUMARATE SCH MG: 25 TABLET ORAL at 20:14

## 2017-09-12 RX ADMIN — NYSTATIN SCH APPLIC: 100000 OINTMENT TOPICAL at 20:25

## 2017-09-12 RX ADMIN — NYSTATIN SCH APPLIC: 100000 OINTMENT TOPICAL at 09:00

## 2017-09-13 VITALS — SYSTOLIC BLOOD PRESSURE: 113 MMHG | DIASTOLIC BLOOD PRESSURE: 74 MMHG

## 2017-09-13 VITALS — DIASTOLIC BLOOD PRESSURE: 75 MMHG | SYSTOLIC BLOOD PRESSURE: 120 MMHG

## 2017-09-13 VITALS — SYSTOLIC BLOOD PRESSURE: 116 MMHG | DIASTOLIC BLOOD PRESSURE: 78 MMHG

## 2017-09-13 VITALS — DIASTOLIC BLOOD PRESSURE: 63 MMHG | SYSTOLIC BLOOD PRESSURE: 108 MMHG

## 2017-09-13 LAB
BUN SERPL-MCNC: 9 MG/DL (ref 7–18)
HCT VFR BLD CALC: 38.5 % (ref 39.2–51.8)
HGB BLD-MCNC: 12.8 G/DL (ref 13.7–18)
WBC # BLD AUTO: 10.2 X10^3/UL (ref 3.4–10)

## 2017-09-13 RX ADMIN — Medication SCH TAB: at 08:16

## 2017-09-13 RX ADMIN — METRONIDAZOLE SCH MLS/HR: 500 INJECTION, SOLUTION INTRAVENOUS at 13:29

## 2017-09-13 RX ADMIN — TAMSULOSIN HYDROCHLORIDE SCH MG: 0.4 CAPSULE ORAL at 20:47

## 2017-09-13 RX ADMIN — VANCOMYCIN HYDROCHLORIDE SCH MG: 1 INJECTION, POWDER, LYOPHILIZED, FOR SOLUTION INTRAVENOUS at 17:53

## 2017-09-13 RX ADMIN — Medication SCH MCG: at 08:16

## 2017-09-13 RX ADMIN — NYSTATIN SCH APPLIC: 100000 OINTMENT TOPICAL at 08:17

## 2017-09-13 RX ADMIN — VANCOMYCIN HYDROCHLORIDE SCH MG: 1 INJECTION, POWDER, LYOPHILIZED, FOR SOLUTION INTRAVENOUS at 11:53

## 2017-09-13 RX ADMIN — POTASSIUM CHLORIDE SCH MEQ: 20 TABLET, EXTENDED RELEASE ORAL at 08:17

## 2017-09-13 RX ADMIN — NYSTATIN SCH APPLIC: 100000 OINTMENT TOPICAL at 20:48

## 2017-09-13 RX ADMIN — QUETIAPINE FUMARATE SCH MG: 25 TABLET ORAL at 11:52

## 2017-09-13 RX ADMIN — METRONIDAZOLE SCH MLS/HR: 500 INJECTION, SOLUTION INTRAVENOUS at 21:54

## 2017-09-13 RX ADMIN — VANCOMYCIN HYDROCHLORIDE SCH MG: 1 INJECTION, POWDER, LYOPHILIZED, FOR SOLUTION INTRAVENOUS at 05:50

## 2017-09-13 RX ADMIN — DIVALPROEX SODIUM SCH MG: 125 TABLET, DELAYED RELEASE ORAL at 20:47

## 2017-09-13 RX ADMIN — CEFTRIAXONE SCH MLS/HR: 1 INJECTION, SOLUTION INTRAVENOUS at 17:53

## 2017-09-13 RX ADMIN — METRONIDAZOLE SCH MLS/HR: 500 INJECTION, SOLUTION INTRAVENOUS at 05:59

## 2017-09-13 RX ADMIN — HALOPERIDOL LACTATE PRN MG: 5 INJECTION, SOLUTION INTRAMUSCULAR at 19:33

## 2017-09-13 RX ADMIN — DIVALPROEX SODIUM SCH MG: 125 TABLET, DELAYED RELEASE ORAL at 15:16

## 2017-09-13 RX ADMIN — QUETIAPINE FUMARATE SCH MG: 25 TABLET ORAL at 20:47

## 2017-09-13 RX ADMIN — ENOXAPARIN SODIUM SCH MG: 40 INJECTION SUBCUTANEOUS at 20:47

## 2017-09-13 RX ADMIN — DIVALPROEX SODIUM SCH MG: 125 TABLET, DELAYED RELEASE ORAL at 05:50

## 2017-09-13 RX ADMIN — DIVALPROEX SODIUM SCH MG: 125 TABLET, DELAYED RELEASE ORAL at 11:52

## 2017-09-13 RX ADMIN — Medication SCH MG: at 08:16

## 2017-09-14 VITALS — DIASTOLIC BLOOD PRESSURE: 57 MMHG | SYSTOLIC BLOOD PRESSURE: 110 MMHG

## 2017-09-14 VITALS — DIASTOLIC BLOOD PRESSURE: 56 MMHG | SYSTOLIC BLOOD PRESSURE: 125 MMHG

## 2017-09-14 VITALS — DIASTOLIC BLOOD PRESSURE: 64 MMHG | SYSTOLIC BLOOD PRESSURE: 116 MMHG

## 2017-09-14 VITALS — SYSTOLIC BLOOD PRESSURE: 123 MMHG | DIASTOLIC BLOOD PRESSURE: 69 MMHG

## 2017-09-14 RX ADMIN — TAMSULOSIN HYDROCHLORIDE SCH MG: 0.4 CAPSULE ORAL at 19:56

## 2017-09-14 RX ADMIN — QUETIAPINE FUMARATE SCH MG: 25 TABLET ORAL at 19:57

## 2017-09-14 RX ADMIN — DIVALPROEX SODIUM SCH MG: 125 TABLET, DELAYED RELEASE ORAL at 10:58

## 2017-09-14 RX ADMIN — METRONIDAZOLE SCH MLS/HR: 500 INJECTION, SOLUTION INTRAVENOUS at 22:57

## 2017-09-14 RX ADMIN — Medication SCH MG: at 09:42

## 2017-09-14 RX ADMIN — Medication SCH TAB: at 09:42

## 2017-09-14 RX ADMIN — Medication SCH MCG: at 09:42

## 2017-09-14 RX ADMIN — QUETIAPINE FUMARATE SCH MG: 25 TABLET ORAL at 21:00

## 2017-09-14 RX ADMIN — ENOXAPARIN SODIUM SCH MG: 40 INJECTION SUBCUTANEOUS at 19:57

## 2017-09-14 RX ADMIN — VANCOMYCIN HYDROCHLORIDE SCH MG: 1 INJECTION, POWDER, LYOPHILIZED, FOR SOLUTION INTRAVENOUS at 00:00

## 2017-09-14 RX ADMIN — VANCOMYCIN HYDROCHLORIDE SCH MG: 1 INJECTION, POWDER, LYOPHILIZED, FOR SOLUTION INTRAVENOUS at 17:59

## 2017-09-14 RX ADMIN — MEROPENEM SCH MLS/HR: 500 INJECTION, POWDER, FOR SOLUTION INTRAVENOUS at 11:50

## 2017-09-14 RX ADMIN — MEROPENEM SCH MLS/HR: 500 INJECTION, POWDER, FOR SOLUTION INTRAVENOUS at 19:56

## 2017-09-14 RX ADMIN — QUETIAPINE FUMARATE SCH MG: 25 TABLET ORAL at 11:50

## 2017-09-14 RX ADMIN — VANCOMYCIN HYDROCHLORIDE SCH MG: 1 INJECTION, POWDER, LYOPHILIZED, FOR SOLUTION INTRAVENOUS at 05:25

## 2017-09-14 RX ADMIN — DIVALPROEX SODIUM SCH MG: 125 TABLET, DELAYED RELEASE ORAL at 21:00

## 2017-09-14 RX ADMIN — DIVALPROEX SODIUM SCH MG: 125 TABLET, DELAYED RELEASE ORAL at 05:25

## 2017-09-14 RX ADMIN — METRONIDAZOLE SCH MLS/HR: 500 INJECTION, SOLUTION INTRAVENOUS at 13:58

## 2017-09-14 RX ADMIN — METRONIDAZOLE SCH MLS/HR: 500 INJECTION, SOLUTION INTRAVENOUS at 05:25

## 2017-09-14 RX ADMIN — DIVALPROEX SODIUM SCH MG: 125 TABLET, DELAYED RELEASE ORAL at 16:06

## 2017-09-14 RX ADMIN — ENOXAPARIN SODIUM SCH MG: 40 INJECTION SUBCUTANEOUS at 21:00

## 2017-09-14 RX ADMIN — TAMSULOSIN HYDROCHLORIDE SCH MG: 0.4 CAPSULE ORAL at 21:00

## 2017-09-14 RX ADMIN — NYSTATIN SCH APPLIC: 100000 OINTMENT TOPICAL at 09:43

## 2017-09-14 RX ADMIN — VANCOMYCIN HYDROCHLORIDE SCH MG: 1 INJECTION, POWDER, LYOPHILIZED, FOR SOLUTION INTRAVENOUS at 11:50

## 2017-09-14 RX ADMIN — DIVALPROEX SODIUM SCH MG: 125 TABLET, DELAYED RELEASE ORAL at 19:57

## 2017-09-15 VITALS — DIASTOLIC BLOOD PRESSURE: 62 MMHG | SYSTOLIC BLOOD PRESSURE: 100 MMHG

## 2017-09-15 VITALS — SYSTOLIC BLOOD PRESSURE: 105 MMHG | DIASTOLIC BLOOD PRESSURE: 60 MMHG

## 2017-09-15 VITALS — DIASTOLIC BLOOD PRESSURE: 68 MMHG | SYSTOLIC BLOOD PRESSURE: 111 MMHG

## 2017-09-15 VITALS — SYSTOLIC BLOOD PRESSURE: 108 MMHG | DIASTOLIC BLOOD PRESSURE: 50 MMHG

## 2017-09-15 LAB
BUN SERPL-MCNC: 11 MG/DL (ref 7–18)
HCT VFR BLD CALC: 38.6 % (ref 39.2–51.8)
HGB BLD-MCNC: 12.8 G/DL (ref 13.7–18)
WBC # BLD AUTO: 9 X10^3/UL (ref 3.4–10)

## 2017-09-15 RX ADMIN — DIVALPROEX SODIUM SCH MG: 125 TABLET, DELAYED RELEASE ORAL at 19:23

## 2017-09-15 RX ADMIN — MEROPENEM SCH MLS/HR: 500 INJECTION, POWDER, FOR SOLUTION INTRAVENOUS at 03:49

## 2017-09-15 RX ADMIN — NYSTATIN SCH APPLIC: 100000 OINTMENT TOPICAL at 11:00

## 2017-09-15 RX ADMIN — METRONIDAZOLE SCH MLS/HR: 500 INJECTION, SOLUTION INTRAVENOUS at 14:53

## 2017-09-15 RX ADMIN — VANCOMYCIN HYDROCHLORIDE SCH MG: 1 INJECTION, POWDER, LYOPHILIZED, FOR SOLUTION INTRAVENOUS at 19:23

## 2017-09-15 RX ADMIN — VANCOMYCIN HYDROCHLORIDE SCH MG: 1 INJECTION, POWDER, LYOPHILIZED, FOR SOLUTION INTRAVENOUS at 12:01

## 2017-09-15 RX ADMIN — Medication SCH MG: at 11:00

## 2017-09-15 RX ADMIN — METRONIDAZOLE SCH MLS/HR: 500 INJECTION, SOLUTION INTRAVENOUS at 06:19

## 2017-09-15 RX ADMIN — MEROPENEM SCH MLS/HR: 500 INJECTION, POWDER, FOR SOLUTION INTRAVENOUS at 12:01

## 2017-09-15 RX ADMIN — QUETIAPINE FUMARATE SCH MG: 25 TABLET ORAL at 19:23

## 2017-09-15 RX ADMIN — MEROPENEM SCH MLS/HR: 500 INJECTION, POWDER, FOR SOLUTION INTRAVENOUS at 19:23

## 2017-09-15 RX ADMIN — TAMSULOSIN HYDROCHLORIDE SCH MG: 0.4 CAPSULE ORAL at 19:23

## 2017-09-15 RX ADMIN — DIVALPROEX SODIUM SCH MG: 125 TABLET, DELAYED RELEASE ORAL at 06:00

## 2017-09-15 RX ADMIN — ENOXAPARIN SODIUM SCH MG: 40 INJECTION SUBCUTANEOUS at 19:35

## 2017-09-15 RX ADMIN — NYSTATIN SCH APPLIC: 100000 OINTMENT TOPICAL at 03:49

## 2017-09-15 RX ADMIN — VANCOMYCIN HYDROCHLORIDE SCH MG: 1 INJECTION, POWDER, LYOPHILIZED, FOR SOLUTION INTRAVENOUS at 06:00

## 2017-09-15 RX ADMIN — QUETIAPINE FUMARATE SCH MG: 25 TABLET ORAL at 11:30

## 2017-09-15 RX ADMIN — DIVALPROEX SODIUM SCH MG: 125 TABLET, DELAYED RELEASE ORAL at 11:00

## 2017-09-15 RX ADMIN — DIVALPROEX SODIUM SCH MG: 125 TABLET, DELAYED RELEASE ORAL at 16:00

## 2017-09-15 RX ADMIN — Medication SCH MCG: at 11:00

## 2017-09-15 RX ADMIN — Medication SCH TAB: at 11:00

## 2017-09-15 RX ADMIN — VANCOMYCIN HYDROCHLORIDE SCH MG: 1 INJECTION, POWDER, LYOPHILIZED, FOR SOLUTION INTRAVENOUS at 00:00

## 2017-09-16 VITALS — SYSTOLIC BLOOD PRESSURE: 118 MMHG | DIASTOLIC BLOOD PRESSURE: 74 MMHG

## 2017-09-16 VITALS — SYSTOLIC BLOOD PRESSURE: 122 MMHG | DIASTOLIC BLOOD PRESSURE: 74 MMHG

## 2017-09-16 VITALS — SYSTOLIC BLOOD PRESSURE: 95 MMHG | DIASTOLIC BLOOD PRESSURE: 63 MMHG

## 2017-09-16 VITALS — SYSTOLIC BLOOD PRESSURE: 107 MMHG | DIASTOLIC BLOOD PRESSURE: 55 MMHG

## 2017-09-16 RX ADMIN — DIVALPROEX SODIUM SCH MG: 125 TABLET, DELAYED RELEASE ORAL at 11:48

## 2017-09-16 RX ADMIN — ENOXAPARIN SODIUM SCH MG: 40 INJECTION SUBCUTANEOUS at 22:28

## 2017-09-16 RX ADMIN — ERTAPENEM SODIUM SCH MLS/HR: 1 INJECTION, POWDER, LYOPHILIZED, FOR SOLUTION INTRAMUSCULAR; INTRAVENOUS at 18:15

## 2017-09-16 RX ADMIN — HALOPERIDOL LACTATE PRN MG: 5 INJECTION, SOLUTION INTRAMUSCULAR at 22:13

## 2017-09-16 RX ADMIN — NYSTATIN SCH APPLIC: 100000 OINTMENT TOPICAL at 22:25

## 2017-09-16 RX ADMIN — DIVALPROEX SODIUM SCH MG: 125 TABLET, DELAYED RELEASE ORAL at 17:00

## 2017-09-16 RX ADMIN — Medication SCH MG: at 09:59

## 2017-09-16 RX ADMIN — VANCOMYCIN HYDROCHLORIDE SCH MG: 1 INJECTION, POWDER, LYOPHILIZED, FOR SOLUTION INTRAVENOUS at 19:30

## 2017-09-16 RX ADMIN — VANCOMYCIN HYDROCHLORIDE SCH MG: 1 INJECTION, POWDER, LYOPHILIZED, FOR SOLUTION INTRAVENOUS at 09:59

## 2017-09-16 RX ADMIN — QUETIAPINE FUMARATE SCH MG: 25 TABLET ORAL at 21:00

## 2017-09-16 RX ADMIN — DIVALPROEX SODIUM SCH MG: 125 TABLET, DELAYED RELEASE ORAL at 09:59

## 2017-09-16 RX ADMIN — METRONIDAZOLE SCH MLS/HR: 500 INJECTION, SOLUTION INTRAVENOUS at 22:26

## 2017-09-16 RX ADMIN — NYSTATIN SCH APPLIC: 100000 OINTMENT TOPICAL at 10:00

## 2017-09-16 RX ADMIN — Medication SCH MCG: at 09:59

## 2017-09-16 RX ADMIN — METRONIDAZOLE SCH MLS/HR: 500 INJECTION, SOLUTION INTRAVENOUS at 14:00

## 2017-09-16 RX ADMIN — MEROPENEM SCH MLS/HR: 500 INJECTION, POWDER, FOR SOLUTION INTRAVENOUS at 04:01

## 2017-09-16 RX ADMIN — VANCOMYCIN HYDROCHLORIDE SCH MG: 1 INJECTION, POWDER, LYOPHILIZED, FOR SOLUTION INTRAVENOUS at 14:48

## 2017-09-16 RX ADMIN — DIVALPROEX SODIUM SCH MG: 125 TABLET, DELAYED RELEASE ORAL at 21:00

## 2017-09-16 RX ADMIN — Medication SCH TAB: at 09:00

## 2017-09-16 RX ADMIN — VANCOMYCIN HYDROCHLORIDE SCH MG: 1 INJECTION, POWDER, LYOPHILIZED, FOR SOLUTION INTRAVENOUS at 01:28

## 2017-09-16 RX ADMIN — TAMSULOSIN HYDROCHLORIDE SCH MG: 0.4 CAPSULE ORAL at 21:00

## 2017-09-16 RX ADMIN — METRONIDAZOLE SCH MLS/HR: 500 INJECTION, SOLUTION INTRAVENOUS at 00:58

## 2017-09-16 RX ADMIN — MEROPENEM SCH MLS/HR: 500 INJECTION, POWDER, FOR SOLUTION INTRAVENOUS at 12:20

## 2017-09-16 RX ADMIN — NYSTATIN SCH APPLIC: 100000 OINTMENT TOPICAL at 04:00

## 2017-09-16 RX ADMIN — QUETIAPINE FUMARATE SCH MG: 25 TABLET ORAL at 13:00

## 2017-09-16 RX ADMIN — METRONIDAZOLE SCH MLS/HR: 500 INJECTION, SOLUTION INTRAVENOUS at 06:48

## 2017-09-17 VITALS — SYSTOLIC BLOOD PRESSURE: 115 MMHG | DIASTOLIC BLOOD PRESSURE: 62 MMHG

## 2017-09-17 VITALS — SYSTOLIC BLOOD PRESSURE: 107 MMHG | DIASTOLIC BLOOD PRESSURE: 64 MMHG

## 2017-09-17 VITALS — SYSTOLIC BLOOD PRESSURE: 94 MMHG | DIASTOLIC BLOOD PRESSURE: 60 MMHG

## 2017-09-17 RX ADMIN — DIVALPROEX SODIUM SCH MG: 125 TABLET, DELAYED RELEASE ORAL at 10:42

## 2017-09-17 RX ADMIN — NYSTATIN SCH APPLIC: 100000 OINTMENT TOPICAL at 10:43

## 2017-09-17 RX ADMIN — Medication SCH TAB: at 09:00

## 2017-09-17 RX ADMIN — QUETIAPINE FUMARATE SCH MG: 25 TABLET ORAL at 20:25

## 2017-09-17 RX ADMIN — QUETIAPINE FUMARATE SCH MG: 25 TABLET ORAL at 10:43

## 2017-09-17 RX ADMIN — ERTAPENEM SODIUM SCH MLS/HR: 1 INJECTION, POWDER, LYOPHILIZED, FOR SOLUTION INTRAMUSCULAR; INTRAVENOUS at 17:54

## 2017-09-17 RX ADMIN — MEGESTROL ACETATE SCH MG: 40 SUSPENSION ORAL at 10:00

## 2017-09-17 RX ADMIN — DIVALPROEX SODIUM SCH MG: 125 TABLET, DELAYED RELEASE ORAL at 20:24

## 2017-09-17 RX ADMIN — VANCOMYCIN HYDROCHLORIDE SCH MG: 1 INJECTION, POWDER, LYOPHILIZED, FOR SOLUTION INTRAVENOUS at 01:30

## 2017-09-17 RX ADMIN — VANCOMYCIN HYDROCHLORIDE SCH MG: 1 INJECTION, POWDER, LYOPHILIZED, FOR SOLUTION INTRAVENOUS at 20:24

## 2017-09-17 RX ADMIN — TAMSULOSIN HYDROCHLORIDE SCH MG: 0.4 CAPSULE ORAL at 20:26

## 2017-09-17 RX ADMIN — VANCOMYCIN HYDROCHLORIDE SCH MG: 1 INJECTION, POWDER, LYOPHILIZED, FOR SOLUTION INTRAVENOUS at 10:43

## 2017-09-17 RX ADMIN — DIVALPROEX SODIUM SCH MG: 125 TABLET, DELAYED RELEASE ORAL at 06:00

## 2017-09-17 RX ADMIN — ENOXAPARIN SODIUM SCH MG: 40 INJECTION SUBCUTANEOUS at 20:25

## 2017-09-17 RX ADMIN — QUETIAPINE FUMARATE SCH MG: 25 TABLET ORAL at 21:00

## 2017-09-17 RX ADMIN — HALOPERIDOL LACTATE PRN MG: 5 INJECTION, SOLUTION INTRAMUSCULAR at 19:38

## 2017-09-17 RX ADMIN — Medication SCH MG: at 09:00

## 2017-09-17 RX ADMIN — METRONIDAZOLE SCH MLS/HR: 500 INJECTION, SOLUTION INTRAVENOUS at 05:30

## 2017-09-17 RX ADMIN — DIVALPROEX SODIUM SCH MG: 125 TABLET, DELAYED RELEASE ORAL at 17:40

## 2017-09-17 RX ADMIN — QUETIAPINE FUMARATE SCH MG: 25 TABLET ORAL at 12:50

## 2017-09-17 RX ADMIN — NYSTATIN SCH APPLIC: 100000 OINTMENT TOPICAL at 21:00

## 2017-09-17 RX ADMIN — VANCOMYCIN HYDROCHLORIDE SCH MG: 1 INJECTION, POWDER, LYOPHILIZED, FOR SOLUTION INTRAVENOUS at 13:34

## 2017-09-17 RX ADMIN — QUETIAPINE FUMARATE SCH MG: 25 TABLET ORAL at 12:00

## 2017-09-17 RX ADMIN — Medication SCH MCG: at 09:00

## 2017-09-18 VITALS — DIASTOLIC BLOOD PRESSURE: 62 MMHG | SYSTOLIC BLOOD PRESSURE: 111 MMHG

## 2017-09-18 VITALS — DIASTOLIC BLOOD PRESSURE: 65 MMHG | SYSTOLIC BLOOD PRESSURE: 100 MMHG

## 2017-09-18 RX ADMIN — MEGESTROL ACETATE SCH MG: 40 SUSPENSION ORAL at 08:20

## 2017-09-18 RX ADMIN — DIVALPROEX SODIUM SCH MG: 125 TABLET, DELAYED RELEASE ORAL at 12:25

## 2017-09-18 RX ADMIN — Medication SCH MG: at 08:19

## 2017-09-18 RX ADMIN — VANCOMYCIN HYDROCHLORIDE SCH MG: 1 INJECTION, POWDER, LYOPHILIZED, FOR SOLUTION INTRAVENOUS at 08:17

## 2017-09-18 RX ADMIN — Medication SCH MCG: at 08:19

## 2017-09-18 RX ADMIN — Medication SCH TAB: at 09:00

## 2017-09-18 RX ADMIN — DIVALPROEX SODIUM SCH MG: 125 TABLET, DELAYED RELEASE ORAL at 05:52

## 2017-09-18 RX ADMIN — QUETIAPINE FUMARATE SCH MG: 25 TABLET ORAL at 08:19

## 2017-09-18 RX ADMIN — VANCOMYCIN HYDROCHLORIDE SCH MG: 1 INJECTION, POWDER, LYOPHILIZED, FOR SOLUTION INTRAVENOUS at 01:45

## 2017-09-18 RX ADMIN — QUETIAPINE FUMARATE SCH MG: 25 TABLET ORAL at 12:31

## 2017-09-18 RX ADMIN — NYSTATIN SCH APPLIC: 100000 OINTMENT TOPICAL at 08:20

## 2017-09-28 NOTE — ADDENDUM NOTE
Encounter addended by: Tamika Grace R.N. on: 9/28/2017  1:21 PM<BR>    Actions taken: Flowsheet accepted

## 2018-02-05 ENCOUNTER — HOSPITAL ENCOUNTER (EMERGENCY)
Dept: HOSPITAL 8 - ED | Age: 73
LOS: 1 days | Discharge: HOME | End: 2018-02-06
Payer: MEDICARE

## 2018-02-05 VITALS — WEIGHT: 141.1 LBS | HEIGHT: 68 IN | BODY MASS INDEX: 21.38 KG/M2

## 2018-02-05 DIAGNOSIS — G30.9: ICD-10-CM
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
## 2018-02-05 DIAGNOSIS — R26.9: Primary | ICD-10-CM
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
## 2018-02-05 DIAGNOSIS — Z88.8: ICD-10-CM
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
## 2018-02-05 DIAGNOSIS — F02.80: ICD-10-CM

## 2018-02-05 PROCEDURE — 99283 EMERGENCY DEPT VISIT LOW MDM: CPT

## 2018-02-06 VITALS — SYSTOLIC BLOOD PRESSURE: 96 MMHG | DIASTOLIC BLOOD PRESSURE: 54 MMHG

## 2018-03-07 ENCOUNTER — HOSPITAL ENCOUNTER (EMERGENCY)
Dept: HOSPITAL 8 - ED | Age: 73
Discharge: HOME | End: 2018-03-07
Payer: MEDICARE

## 2018-03-07 VITALS — BODY MASS INDEX: 26.73 KG/M2 | WEIGHT: 176.37 LBS | HEIGHT: 68 IN

## 2018-03-07 VITALS — SYSTOLIC BLOOD PRESSURE: 100 MMHG | DIASTOLIC BLOOD PRESSURE: 68 MMHG
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
## 2018-03-07 DIAGNOSIS — S50.11XA: Primary | ICD-10-CM
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
## 2018-03-07 PROCEDURE — 99284 EMERGENCY DEPT VISIT MOD MDM: CPT

## 2018-06-23 ENCOUNTER — HOSPITAL ENCOUNTER (EMERGENCY)
Dept: HOSPITAL 8 - ED | Age: 73
LOS: 1 days | Discharge: HOME | End: 2018-06-24
Payer: MEDICARE

## 2018-06-23 VITALS — HEIGHT: 69 IN | BODY MASS INDEX: 24.82 KG/M2 | WEIGHT: 167.55 LBS

## 2018-06-23 DIAGNOSIS — K40.90: Primary | ICD-10-CM

## 2018-06-23 DIAGNOSIS — F02.80: ICD-10-CM

## 2018-06-23 DIAGNOSIS — G30.9: ICD-10-CM

## 2018-06-23 DIAGNOSIS — Z88.6: ICD-10-CM

## 2018-06-23 LAB
ALBUMIN SERPL-MCNC: 3.1 G/DL (ref 3.4–5)
ALP SERPL-CCNC: 48 U/L (ref 45–117)
ALT SERPL-CCNC: 23 U/L (ref 12–78)
ANION GAP SERPL CALC-SCNC: 5 MMOL/L (ref 5–15)
BASOPHILS # BLD AUTO: 0.05 X10^3/UL (ref 0–0.1)
BASOPHILS NFR BLD AUTO: 1 % (ref 0–1)
BILIRUB SERPL-MCNC: 0.2 MG/DL (ref 0.2–1)
CALCIUM SERPL-MCNC: 8.3 MG/DL (ref 8.5–10.1)
CHLORIDE SERPL-SCNC: 108 MMOL/L (ref 98–107)
CREAT SERPL-MCNC: 1.1 MG/DL (ref 0.7–1.3)
CULTURE INDICATED?: YES
EOSINOPHIL # BLD AUTO: 0.11 X10^3/UL (ref 0–0.4)
EOSINOPHIL NFR BLD AUTO: 1 % (ref 1–7)
ERYTHROCYTE [DISTWIDTH] IN BLOOD BY AUTOMATED COUNT: 14.7 % (ref 9.4–14.8)
LYMPHOCYTES # BLD AUTO: 2.28 X10^3/UL (ref 1–3.4)
LYMPHOCYTES NFR BLD AUTO: 30 % (ref 22–44)
MCH RBC QN AUTO: 30.9 PG (ref 27.5–34.5)
MCHC RBC AUTO-ENTMCNC: 33.7 G/DL (ref 33.2–36.2)
MCV RBC AUTO: 91.5 FL (ref 81–97)
MD: NO
MICROSCOPIC: (no result)
MONOCYTES # BLD AUTO: 0.79 X10^3/UL (ref 0.2–0.8)
MONOCYTES NFR BLD AUTO: 11 % (ref 2–9)
NEUTROPHILS # BLD AUTO: 4.29 X10^3/UL (ref 1.8–6.8)
NEUTROPHILS NFR BLD AUTO: 57 % (ref 42–75)
PLATELET # BLD AUTO: 152 X10^3/UL (ref 130–400)
PMV BLD AUTO: 8.6 FL (ref 7.4–10.4)
PROT SERPL-MCNC: 6.8 G/DL (ref 6.4–8.2)
RBC # BLD AUTO: 4.48 X10^6/UL (ref 4.38–5.82)

## 2018-06-23 PROCEDURE — 87086 URINE CULTURE/COLONY COUNT: CPT

## 2018-06-23 PROCEDURE — 96376 TX/PRO/DX INJ SAME DRUG ADON: CPT

## 2018-06-23 PROCEDURE — 36415 COLL VENOUS BLD VENIPUNCTURE: CPT

## 2018-06-23 PROCEDURE — 83690 ASSAY OF LIPASE: CPT

## 2018-06-23 PROCEDURE — 80053 COMPREHEN METABOLIC PANEL: CPT

## 2018-06-23 PROCEDURE — 96374 THER/PROPH/DIAG INJ IV PUSH: CPT

## 2018-06-23 PROCEDURE — 96372 THER/PROPH/DIAG INJ SC/IM: CPT

## 2018-06-23 PROCEDURE — 81001 URINALYSIS AUTO W/SCOPE: CPT

## 2018-06-23 PROCEDURE — 85025 COMPLETE CBC W/AUTO DIFF WBC: CPT

## 2018-06-23 PROCEDURE — 99284 EMERGENCY DEPT VISIT MOD MDM: CPT

## 2018-06-24 VITALS — DIASTOLIC BLOOD PRESSURE: 74 MMHG | SYSTOLIC BLOOD PRESSURE: 124 MMHG

## 2018-06-27 ENCOUNTER — HOSPITAL ENCOUNTER (EMERGENCY)
Dept: HOSPITAL 8 - ED | Age: 73
Discharge: HOME | End: 2018-06-27
Payer: MEDICARE

## 2018-06-27 VITALS — HEIGHT: 66 IN | BODY MASS INDEX: 27.28 KG/M2 | WEIGHT: 169.76 LBS

## 2018-06-27 VITALS — SYSTOLIC BLOOD PRESSURE: 111 MMHG | DIASTOLIC BLOOD PRESSURE: 69 MMHG

## 2018-06-27 DIAGNOSIS — K40.90: ICD-10-CM

## 2018-06-27 DIAGNOSIS — F02.80: ICD-10-CM

## 2018-06-27 DIAGNOSIS — G30.9: ICD-10-CM

## 2018-06-27 DIAGNOSIS — Z79.899: ICD-10-CM

## 2018-06-27 DIAGNOSIS — N30.00: Primary | ICD-10-CM

## 2018-06-27 LAB
ALBUMIN SERPL-MCNC: 3.2 G/DL (ref 3.4–5)
ANION GAP SERPL CALC-SCNC: 6 MMOL/L (ref 5–15)
BASOPHILS # BLD AUTO: 0.05 X10^3/UL (ref 0–0.1)
BASOPHILS NFR BLD AUTO: 1 % (ref 0–1)
CALCIUM SERPL-MCNC: 8.6 MG/DL (ref 8.5–10.1)
CHLORIDE SERPL-SCNC: 106 MMOL/L (ref 98–107)
CREAT SERPL-MCNC: 1.13 MG/DL (ref 0.7–1.3)
CULTURE INDICATED?: YES
EOSINOPHIL # BLD AUTO: 0.11 X10^3/UL (ref 0–0.4)
EOSINOPHIL NFR BLD AUTO: 2 % (ref 1–7)
ERYTHROCYTE [DISTWIDTH] IN BLOOD BY AUTOMATED COUNT: 14.8 % (ref 9.4–14.8)
LYMPHOCYTES # BLD AUTO: 2 X10^3/UL (ref 1–3.4)
LYMPHOCYTES NFR BLD AUTO: 32 % (ref 22–44)
MCH RBC QN AUTO: 31.4 PG (ref 27.5–34.5)
MCHC RBC AUTO-ENTMCNC: 33.6 G/DL (ref 33.2–36.2)
MCV RBC AUTO: 93.6 FL (ref 81–97)
MD: NO
MICROSCOPIC: (no result)
MONOCYTES # BLD AUTO: 0.52 X10^3/UL (ref 0.2–0.8)
MONOCYTES NFR BLD AUTO: 8 % (ref 2–9)
NEUTROPHILS # BLD AUTO: 3.62 X10^3/UL (ref 1.8–6.8)
NEUTROPHILS NFR BLD AUTO: 58 % (ref 42–75)
PLATELET # BLD AUTO: 147 X10^3/UL (ref 130–400)
PMV BLD AUTO: 8.8 FL (ref 7.4–10.4)
RBC # BLD AUTO: 4.4 X10^6/UL (ref 4.38–5.82)

## 2018-06-27 PROCEDURE — 87086 URINE CULTURE/COLONY COUNT: CPT

## 2018-06-27 PROCEDURE — 80048 BASIC METABOLIC PNL TOTAL CA: CPT

## 2018-06-27 PROCEDURE — 99284 EMERGENCY DEPT VISIT MOD MDM: CPT

## 2018-06-27 PROCEDURE — 85025 COMPLETE CBC W/AUTO DIFF WBC: CPT

## 2018-06-27 PROCEDURE — 36415 COLL VENOUS BLD VENIPUNCTURE: CPT

## 2018-06-27 PROCEDURE — 82040 ASSAY OF SERUM ALBUMIN: CPT

## 2018-06-27 PROCEDURE — 96372 THER/PROPH/DIAG INJ SC/IM: CPT

## 2018-06-27 PROCEDURE — 81001 URINALYSIS AUTO W/SCOPE: CPT

## 2018-09-23 ENCOUNTER — HOSPITAL ENCOUNTER (EMERGENCY)
Dept: HOSPITAL 8 - ED | Age: 73
Discharge: HOME | End: 2018-09-23
Payer: MEDICARE

## 2018-09-23 VITALS — BODY MASS INDEX: 24.29 KG/M2 | WEIGHT: 173.5 LBS | HEIGHT: 71 IN

## 2018-09-23 VITALS — DIASTOLIC BLOOD PRESSURE: 76 MMHG | SYSTOLIC BLOOD PRESSURE: 124 MMHG

## 2018-09-23 DIAGNOSIS — F02.80: ICD-10-CM
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
## 2018-09-23 DIAGNOSIS — S06.0X0A: Primary | ICD-10-CM
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
## 2018-09-23 DIAGNOSIS — G30.1: ICD-10-CM

## 2018-09-23 PROCEDURE — 99284 EMERGENCY DEPT VISIT MOD MDM: CPT

## 2018-09-23 PROCEDURE — 70450 CT HEAD/BRAIN W/O DYE: CPT

## 2018-09-23 PROCEDURE — 93005 ELECTROCARDIOGRAM TRACING: CPT

## 2021-01-14 DIAGNOSIS — Z23 NEED FOR VACCINATION: ICD-10-CM
